# Patient Record
Sex: FEMALE | Race: WHITE | Employment: FULL TIME | URBAN - METROPOLITAN AREA
[De-identification: names, ages, dates, MRNs, and addresses within clinical notes are randomized per-mention and may not be internally consistent; named-entity substitution may affect disease eponyms.]

---

## 2018-03-26 ENCOUNTER — HOSPITAL ENCOUNTER (OUTPATIENT)
Dept: PULMONOLOGY | Age: 68
Discharge: HOME | End: 2018-03-26
Attending: INTERNAL MEDICINE
Payer: COMMERCIAL

## 2018-03-26 DIAGNOSIS — R06.09 DYSPNEA ON EXERTION: ICD-10-CM

## 2018-03-26 PROCEDURE — 94375 RESPIRATORY FLOW VOLUME LOOP: CPT

## 2018-03-26 PROCEDURE — 94726 PLETHYSMOGRAPHY LUNG VOLUMES: CPT

## 2018-03-26 PROCEDURE — 94729 DIFFUSING CAPACITY: CPT

## 2018-04-09 NOTE — PROCEDURES
[facility]  PULMONARY FUNCTION    Una Castro  MR#: [id number]  : 1950  ACCOUNT #: [de-identified]   DATE OF SERVICE: 2018    REASON FOR TEST:  Dyspnea with exertion. Spirometry and lung volumes were performed and they revealed  1. No airflow obstruction. 2.  No restrictive lung disease. 3.  Mild reduction in DLCO. 4.  Normal flow volume loop.       Magy Rivera MD       ERG / GN  D: 2018 08:50     T: 2018 09:25  JOB #: [id number]  CC: Dinorah Gerard MD

## 2021-09-13 ENCOUNTER — TRANSCRIBE ORDER (OUTPATIENT)
Dept: SCHEDULING | Age: 71
End: 2021-09-13

## 2021-09-13 DIAGNOSIS — S43.005A SHOULDER DISLOCATION, LEFT, INITIAL ENCOUNTER: Primary | ICD-10-CM

## 2021-09-13 DIAGNOSIS — M75.102 ROTATOR CUFF TEAR, LEFT: ICD-10-CM

## 2021-09-16 ENCOUNTER — HOSPITAL ENCOUNTER (OUTPATIENT)
Dept: CT IMAGING | Age: 71
Discharge: HOME | End: 2021-09-16
Attending: ORTHOPAEDIC SURGERY
Payer: COMMERCIAL

## 2021-09-16 ENCOUNTER — HOSPITAL ENCOUNTER (OUTPATIENT)
Dept: GENERAL RADIOLOGY | Age: 71
Discharge: HOME | End: 2021-09-16
Attending: ORTHOPAEDIC SURGERY
Payer: COMMERCIAL

## 2021-09-16 DIAGNOSIS — S43.005A SHOULDER DISLOCATION, LEFT, INITIAL ENCOUNTER: ICD-10-CM

## 2021-09-16 DIAGNOSIS — M75.102 ROTATOR CUFF TEAR, LEFT: ICD-10-CM

## 2021-09-16 PROCEDURE — 77030014113 XR ARTHRO SHOULDER LT

## 2021-09-16 PROCEDURE — 74011000636 HC RX REV CODE- 636: Performed by: RADIOLOGY

## 2021-09-16 PROCEDURE — 73200 CT UPPER EXTREMITY W/O DYE: CPT

## 2021-09-16 RX ADMIN — IOHEXOL 12 ML: 180 INJECTION INTRAVENOUS at 11:43

## 2021-10-20 VITALS — WEIGHT: 170 LBS | BODY MASS INDEX: 33.38 KG/M2 | HEIGHT: 60 IN

## 2021-10-20 PROBLEM — M75.102 TEAR OF LEFT ROTATOR CUFF: Status: ACTIVE | Noted: 2021-10-20

## 2021-10-20 PROBLEM — S43.005A DISLOCATION OF LEFT SHOULDER JOINT: Status: ACTIVE | Noted: 2021-10-20

## 2021-10-20 RX ORDER — OXYBUTYNIN CHLORIDE 5 MG/1
5 TABLET ORAL 3 TIMES DAILY
COMMUNITY
End: 2021-11-10

## 2021-10-20 RX ORDER — LOSARTAN POTASSIUM 100 MG/1
100 TABLET ORAL DAILY
COMMUNITY

## 2021-11-10 ENCOUNTER — OFFICE VISIT (OUTPATIENT)
Dept: ORTHOPEDIC SURGERY | Age: 71
End: 2021-11-10
Payer: COMMERCIAL

## 2021-11-10 VITALS — HEIGHT: 60 IN | WEIGHT: 170 LBS | BODY MASS INDEX: 33.38 KG/M2

## 2021-11-10 DIAGNOSIS — M70.62 TROCHANTERIC BURSITIS OF BOTH HIPS: ICD-10-CM

## 2021-11-10 DIAGNOSIS — M22.42 CHONDROMALACIA OF LEFT PATELLA: ICD-10-CM

## 2021-11-10 DIAGNOSIS — M22.41 CHONDROMALACIA OF RIGHT PATELLA: ICD-10-CM

## 2021-11-10 DIAGNOSIS — Z98.890 STATUS POST ARTHROSCOPY OF SHOULDER: Primary | ICD-10-CM

## 2021-11-10 DIAGNOSIS — M70.61 TROCHANTERIC BURSITIS OF BOTH HIPS: ICD-10-CM

## 2021-11-10 PROCEDURE — 99024 POSTOP FOLLOW-UP VISIT: CPT | Performed by: ORTHOPAEDIC SURGERY

## 2021-11-10 RX ORDER — GUAIFENESIN 100 MG/5ML
81 LIQUID (ML) ORAL DAILY
COMMUNITY

## 2021-11-10 RX ORDER — OXYBUTYNIN CHLORIDE 10 MG/1
TABLET, EXTENDED RELEASE ORAL
COMMUNITY
Start: 2021-11-04

## 2021-11-10 RX ORDER — AMOXICILLIN 500 MG/1
CAPSULE ORAL
COMMUNITY
Start: 2021-08-19

## 2021-11-10 RX ORDER — DICLOFENAC SODIUM 75 MG/1
75 TABLET, DELAYED RELEASE ORAL 2 TIMES DAILY
Qty: 60 TABLET | Refills: 3 | Status: CANCELLED | OUTPATIENT
Start: 2021-11-10

## 2021-11-10 RX ORDER — DICLOFENAC SODIUM 10 MG/G
GEL TOPICAL
COMMUNITY

## 2021-11-10 RX ORDER — OMEPRAZOLE 40 MG/1
CAPSULE, DELAYED RELEASE ORAL
COMMUNITY
Start: 2021-09-08

## 2021-11-10 NOTE — PROGRESS NOTES
Antoni Yanez (: 1950) is a 73 y.o. female, established patient, here for evaluation of the following chief complaint(s):  Shoulder Pain and Post OP Follow Up       ASSESSMENT/PLAN:  Below is the assessment and plan developed based on review of pertinent history, physical exam, labs, studies, and medications. We will continue physical therapy regimen to work on passive and active range of motion of the shoulder. We will also start therapy for bilateral hip trochanteric bursitis and bilateral knee chondromalacia patella symptoms. I will see her back in 6 weeks to assess her progress. 1. Status post arthroscopy of shoulder  2. Chondromalacia of left patella  3. Chondromalacia of right patella  4. Trochanteric bursitis of both hips      Return in about 6 weeks (around 2021). SUBJECTIVE/OBJECTIVE:  Antoni Yanez (: 1950) is a 73 y.o. female. Status post left shoulder arthroscopy with small rotator cuff repair and acromioplasty. This is her second postoperative appointment. Overall doing well with appropriate pain. She also notes bilateral hip lateral sided pain and bilateral anterior knee pain. Symptoms ongoing since her fall. Allergies   Allergen Reactions    Morphine Other (comments)     Cold sweats & nightmares       Current Outpatient Medications   Medication Sig    aspirin (Timur Chewable Aspirin) 81 mg chewable tablet Take 81 mg by mouth daily.  calcium-vits J0-I-Y4-minerals 166.75 mg- 166.75 unit cap calcium    TURMERIC PO turmeric    amoxicillin (AMOXIL) 500 mg capsule     diclofenac (VOLTAREN) 1 % gel diclofenac 1 % topical gel   APPLY 2 GRAM TO THE AFFECTED AREA(S) BY TOPICAL ROUTE 4 TIMES PER DAY    omeprazole (PRILOSEC) 40 mg capsule     oxybutynin chloride XL (DITROPAN XL) 10 mg CR tablet     losartan (COZAAR) 100 mg tablet Take 100 mg by mouth daily.  cholecalciferol (VITAMIN D3) 400 unit tab tablet Take 1,000 Units by mouth daily.     montelukast (SINGULAIR) 10 mg tablet TAKE 1 TABLET BY MOUTH EVERY DAY    atorvastatin (LIPITOR) 40 mg tablet Take 1 Tab by mouth daily.  albuterol (PROAIR HFA) 90 mcg/Actuation inhaler TAKE 2 PUFFS BY INHALATION EVERY FOUR (4) HOURS AS NEEDED FOR WHEEZING.  aspirin delayed-release (ASPIR-81) 81 mg tablet Take  by mouth daily.  GLUC BALDERRAMA 2KCL/MSM/CSA/ROSA/ (GLUCOSAMIN-CHOND-MSM-ROSA-115HC PO) Take  by mouth.  MULTI-VITAMIN PO Take  by mouth. No current facility-administered medications for this visit. Social History     Socioeconomic History    Marital status:      Spouse name: Not on file    Number of children: Not on file    Years of education: Not on file    Highest education level: Not on file   Occupational History    Not on file   Tobacco Use    Smoking status: Former Smoker     Packs/day: 0.25     Years: 30.00     Pack years: 7.50     Quit date: 10/1/2002     Years since quittin.1    Smokeless tobacco: Never Used    Tobacco comment: on and off for 30 years   Substance and Sexual Activity    Alcohol use: Yes     Alcohol/week: 0.8 standard drinks     Types: 1 Glasses of wine per week     Comment: maybe 1 glass/week    Drug use: No    Sexual activity: Not Currently   Other Topics Concern    Not on file   Social History Narrative    Not on file     Social Determinants of Health     Financial Resource Strain:     Difficulty of Paying Living Expenses: Not on file   Food Insecurity:     Worried About Running Out of Food in the Last Year: Not on file    Jyothi of Food in the Last Year: Not on file   Transportation Needs:     Lack of Transportation (Medical): Not on file    Lack of Transportation (Non-Medical):  Not on file   Physical Activity:     Days of Exercise per Week: Not on file    Minutes of Exercise per Session: Not on file   Stress:     Feeling of Stress : Not on file   Social Connections:     Frequency of Communication with Friends and Family: Not on file    Frequency of Social Gatherings with Friends and Family: Not on file    Attends Yazidism Services: Not on file    Active Member of Clubs or Organizations: Not on file    Attends Club or Organization Meetings: Not on file    Marital Status: Not on file   Intimate Partner Violence:     Fear of Current or Ex-Partner: Not on file    Emotionally Abused: Not on file    Physically Abused: Not on file    Sexually Abused: Not on file   Housing Stability:     Unable to Pay for Housing in the Last Year: Not on file    Number of [place] in the Last Year: Not on file    Unstable Housing in the Last Year: Not on file       Past Surgical History:   Procedure Laterality Date    ENDOSCOPY, COLON, DIAGNOSTIC  6/04    due 6/14    HX BREAST BIOPSY  6/06    BENIGN    HX GI      COLONOSCOPY    HX GYN  10/01    thermal ablation   [address]    clogged milk duct    [place] CARDIAC SURG PROCEDURE UNLIST  2014    CARDIAC CATH       Family History   Problem Relation Age of Onset    Alcohol abuse Mother     Heart Disease Mother     Hypertension Mother    Barraza Elevated Lipids Mother     Alcohol abuse Father     Heart Disease Father     Diabetes Father     Elevated Lipids Father     Hypertension Father     Cancer Paternal Uncle         lung    Heart Disease Brother 46        MI, PTCA/stent    Alcohol abuse Brother     Cancer Daughter         skin    Alcohol abuse Son     Heart Disease Maternal Aunt     Heart Disease Maternal Uncle     Cancer Paternal Aunt         uterine    Heart Disease Maternal Aunt     Heart Disease Maternal Aunt     Heart Disease Maternal Aunt     Heart Disease Paternal Aunt         OB History    No obstetric history on file.             REVIEW OF SYSTEMS:  ROS     Positive for: Musculoskeletal    Last edited by Ben Grimm on 11/10/2021  9:54 AM. (History)        Patient denies any recent fever, chills, nausea, vomiting, chest pain, or shortness of breath. Vitals:  Ht 5' (1.524 m)   Wt 170 lb (77.1 kg)   BMI 33.20 kg/m²    Body mass index is 33.2 kg/m². PHYSICAL EXAM:  General exam: Patient is awake, alert, and oriented x3. Well-appearing. No acute distress. Ambulates with a normal gait. Left shoulder: Neurovascular and sensory intact. Mild swelling and ecchymosis. Incision is well-healed with no erythema or drainage. Bilateral hips: Neurovascular and sensory intact. There is point tenderness palpation at the lateral hip in the area of the trochanteric bursa. Mild swelling. No erythema or ecchymosis. There is normal range of motion of the hip with some lateral sided pain with full flexion and internal rotation. Normal stability. No crepitus. Bilateral knees: Neurovascular and sensory intact. There is tenderness to palpation in the parapatellar region. There is crepitus with range of motion. No significant effusion noted. There is no joint line tenderness to palpation. Brooks's maneuver is nonpainful. Normal stability on Lachman's exam and anterior/posterior drawer testing. No erythema or ecchymosis. IMAGING:    XR Results (most recent):  Results from Hospital Encounter encounter on 09/16/21    XR ARTHRO SHOULDER LT    Narrative  Clinical indication: Shoulder dislocation left. With fluoroscopy assistance, after adequate skin preparation local anesthesia,  22-gauge needle was placed into the left shoulder joint, 15 cc of Isovue 180  were injected. After mild exercise shoulder arthrogram show normal joint  capacity mild abnormality of the synovial lining but no extravasation to suggest  a tear. CT is planned for further evaluation. Impression  No evidence for a rotator cuff tear. CT to follow. FLUOROSCOPY DOSE (air kerma): 40.25 mGy      Results from Hospital Encounter encounter on 02/29/16    XR CHEST PORT    Narrative  **Final Report**      ICD Codes / Adm. Diagnosis: 501386  [id number] / Abdominal Pain Syncope  Examination:  CR CHEST PORT  - [id number] - Feb 29 2016  1:50AM  Accession No:  [id number]  Reason:  pain/syncope      REPORT:  INDICATION:  pain/syncope    EXAM: Single portable view of chest 0145 hours dated 2/29/2016. Comparison  August 20, 2014. Findings: Cardiac silhouette is enlarged. Pulmonary vasculature is not  engorged. There are no focal parenchymal opacities, effusions, or  pneumothorax. Left chest pacer unchanged    Impression  :  1. Enlarged cardiac silhouette, otherwise no acute disease          Signing/Reading Doctor: Umang Franco ([id number])  Approved: Umang Franco ([id number])  Feb 29 2016  1:53AM      Results from Hospital Encounter encounter on 08/20/14    XR CHEST PORT    Narrative  **Final Report**      ICD Codes / Adm. Diagnosis: V43.3  V72.82 / revision  Examination:  CR CHEST PORT  - [id number] - Aug 20 2014 10:47AM  Accession No:  [id number]  Reason:  Evaluate for pneumothorax      REPORT:  EXAM:  CR CHEST PORT    INDICATION:  Evaluate for pneumothorax    COMPARISON:  7/19/2014    FINDINGS:    A portable AP radiograph of the chest was obtained at 10:45 hours. There is  no evidence of pneumothorax. The lungs are clear. There is unchanged  enlargement of the cardiac silhouette. A heart valve prosthesis which  appears to be the aortic valve is noted. There is a dual-chamber pacemaker  which appears intact. No vascular congestion or pleural fluid are  demonstrated. The previous left basilar density has resolved. Impression  :    No evidence of pneumothorax. Cardiomegaly without acute cardiopulmonary  disease. Signing/Reading Doctor: Homa Benz ([id number])  Approved: Homa Benz ([id number])  Aug 20 2014 11:47AM         No orders of the defined types were placed in this encounter. An electronic signature was used to authenticate this note.   -- Deonna Eason DO

## 2021-11-10 NOTE — LETTER
11/10/2021    Patient: Jessie Palomino   YOB: 1950   Date of Visit: 11/10/2021     Ethan Chung MD  [address]  [address] [address]  Via Fax: [phone]    Dear Ethan Chung MD,      Thank you for referring Ms. Sushant Jackson to [facility] for evaluation. My notes for this consultation are attached. If you have questions, please do not hesitate to call me. I look forward to following your patient along with you.       Sincerely,    Rahat Murguia, DO

## 2021-11-11 ENCOUNTER — OFFICE VISIT (OUTPATIENT)
Dept: ORTHOPEDIC SURGERY | Age: 71
End: 2021-11-11
Payer: COMMERCIAL

## 2021-11-11 DIAGNOSIS — M25.512 LEFT SHOULDER PAIN, UNSPECIFIED CHRONICITY: ICD-10-CM

## 2021-11-11 DIAGNOSIS — Z98.890 STATUS POST ARTHROSCOPY OF SHOULDER: Primary | ICD-10-CM

## 2021-11-11 PROCEDURE — 97110 THERAPEUTIC EXERCISES: CPT | Performed by: PHYSICAL THERAPIST

## 2021-11-11 NOTE — PROGRESS NOTES
I have reviewed the notes, assessments, and/or procedures performed by Angelica Fuss PTA, I concur with her/his documentation of Dio Yoo.

## 2021-11-11 NOTE — PROGRESS NOTES
PT DAILY TREATMENT NOTE    Patient Name: Matt Vallejo  Date:2021  : 1950  [x]  Patient  Verified  Payor: Jose Theodore / Plan: Shanelle Tia / Product Type: HMO /    Total Treatment Time (min): 55  Total Timed Codes (min): 55  Visit #:   1. Status post arthroscopy of shoulder    2. Left shoulder pain, unspecified chronicity       SUBJECTIVE  Subjective functional status/changes:   [] No changes reported    Patient reports she is a little more sore today than usual since receiving her covid-19 booster shot yesterday. OBJECTIVE    Manual Therapy:     Manual posterior and inferior glenohumeral mobilizations and PROM for glenohumeral mobility and flexibility all planes to decrease joint restrictions and increase range of motion. LLPS for internal/external rotation with humeral head stabilization. STM to RC musculature. Therapeutic Exercise:   Strengthening/Endurance/ADL function/Neuromuscular reeducation activities/exercises supervised and completed per flow sheet. ROM      STRENGTHENING   NMR/PROPRIO   [x] pulleys sit/stand    [x] scapular rows: grn   [x] DAP eccentric scaption: 5 #  [x] AAROM flexion with stick: 0 #  [x] iso.  IR/ER: pch   [] bodyblade  [x] AAROM elevation TG ball roll  [x] iso. ant/post deltoid: pch  [] BOW push-ups  []  1/2 foam roll walk    [] UBE:   man. @ lv 1.0  [] ABC's with BOW        [] DAP bicep curls:    #  [] DAP tricep press:   #  [] DAP FWD press:   #    Added/Changed Exercises:  []  Advanced as indicated in bold to address: [] functional strength/ROM deficits [] balance/proprioceptive tasks  []  Modified: [] per subjective reports [] for patient time constraints [] for clinic time constraints      Modality:  []  E-Stim: type _ x _ min     []att   []unatt   []w/ice   []w/heat  []  Ultrasound: []cont   []pulse    _ W/cm2 x _  min   []1MHz   []3MHz  [x]  Ice pack: post      [x]  Hot pack: pre    []  Other:     Neuromuscular Re-education:  [] Kinesiotaping for   []  Neuromuscular reeducation to the VMO with use of [place] electrical stimulation in conjunction with active contraction and exercises. Patient Education: [] Review HEP    [] Progressed/Changed HEP based on:  [] positioning   [] body mechanics   [] transfers   [] heat/ice application      ASSESSMENT  []  See progress note/recertification    She has very good ROM at this point in post-op timeline with expected end range capsular tightness. Continued to advance strengthening next visit.      PLAN  [x]  Upgrade activities as tolerated      [x]  Continue plan of care  []  Discharge due to:_  [] Other:_      Co-treatment by Art Heal, PTA 11/11/2021  4:55 PM

## 2021-11-15 ENCOUNTER — OFFICE VISIT (OUTPATIENT)
Dept: ORTHOPEDIC SURGERY | Age: 71
End: 2021-11-15
Payer: COMMERCIAL

## 2021-11-15 DIAGNOSIS — G89.18 ACUTE POSTOPERATIVE PAIN OF LEFT SHOULDER: Primary | ICD-10-CM

## 2021-11-15 DIAGNOSIS — M67.912 ROTATOR CUFF DYSFUNCTION, LEFT: ICD-10-CM

## 2021-11-15 DIAGNOSIS — M25.512 ACUTE POSTOPERATIVE PAIN OF LEFT SHOULDER: Primary | ICD-10-CM

## 2021-11-15 PROCEDURE — 97110 THERAPEUTIC EXERCISES: CPT | Performed by: PHYSICAL THERAPIST

## 2021-11-15 NOTE — PROGRESS NOTES
PT DAILY TREATMENT NOTE    Patient Name: Rolo Barajas  Date:11/15/2021  : 1950  [x]  Patient  Verified  Payor: [employer] / Plan: Ever Gallego / Product Type: HMO /    Total Treatment Time (min): 60  Total Timed Codes (min): 60    1. Status post arthroscopy of shoulder    2. Left shoulder pain, unspecified chronicity       SUBJECTIVE  Subjective functional status/changes:   [] No changes reported      Patient states that shoulder is sore. Believes it may be related to increased activity cleaning around her home and returning to work. Locates soreness around the scapula, upper trapezius, and proximal humerus. OBJECTIVE    Manual Therapy:     Manual posterior and inferior glenohumeral mobilizations and PROM for glenohumeral mobility and flexibility all planes to decrease joint restrictions and increase range of motion. LLPS for internal/external rotation with humeral head stabilization. STM to RC musculature. Therapeutic Exercise:   Strengthening/Endurance/ADL function/Neuromuscular reeducation activities/exercises supervised and completed per flow sheet. ROM      STRENGTHENING   NMR/PROPRIO   [x] pulleys sit/stand    [x] scapular rows: grn   [x] DAP eccentric scaption: 5 #  [x] AAROM flexion with stick: 0 #  [x] iso.  IR/ER: pch   [] bodyblade  [x] AAROM elevation TG ball roll  [x] ant/post deltoid: pch  [] BOW push-ups  []  1/2 foam roll walk    [] UBE:   man. @ lv 1.0  [] ABC's with BOW        [] DAP bicep curls:    #  [] DAP tricep press:   #  [] DAP FWD press:   #    Added/Changed Exercises:  []  Advanced as indicated in bold to address: [] functional strength/ROM deficits [] balance/proprioceptive tasks  []  Modified: [] per subjective reports [] for patient time constraints [] for clinic time constraints       Modality:  []  E-Stim: type _ x _ min     []att   []unatt   []w/ice   []w/heat  []  Ultrasound: []cont   []pulse    _ W/cm2 x _  min   []1MHz   []3MHz  [x]  Ice pack: post [x]  Hot pack: pre    []  Other:     Neuromuscular Re-education:  []  Kinesiotaping for   []  Neuromuscular reeducation to the VMO with use of [place] electrical stimulation in conjunction with active contraction and exercises. Manual assisted neuromuscular down regulation techniques to the: upper trapezius,supraspinatus, infraspinatus    Patient Education: [] Review HEP    [] Progressed/Changed HEP based on:  [] positioning   [x] body mechanics   [] transfers   [] heat/ice application      ASSESSMENT  [x]  See progress note/recertification  Good PROM for this point in rehab timeline. Appropriate soreness for recent increase in activity.       PLAN  [x]  Upgrade activities as tolerated      [x]  Continue plan of care  []  Discharge due to:_  [] Other:_      Co-treatment by Olive Jolly PT, DPT 11/15/2021  4:55 PM

## 2021-11-17 ENCOUNTER — OFFICE VISIT (OUTPATIENT)
Dept: ORTHOPEDIC SURGERY | Age: 71
End: 2021-11-17
Payer: COMMERCIAL

## 2021-11-17 DIAGNOSIS — M25.512 ACUTE POSTOPERATIVE PAIN OF LEFT SHOULDER: ICD-10-CM

## 2021-11-17 DIAGNOSIS — G89.18 ACUTE POSTOPERATIVE PAIN OF LEFT SHOULDER: ICD-10-CM

## 2021-11-17 DIAGNOSIS — M67.912 ROTATOR CUFF DYSFUNCTION, LEFT: Primary | ICD-10-CM

## 2021-11-17 PROCEDURE — 97110 THERAPEUTIC EXERCISES: CPT | Performed by: PHYSICAL THERAPIST

## 2021-11-17 NOTE — PROGRESS NOTES
PT DAILY TREATMENT NOTE    Patient Name: Ravindra Kelsey  Date:2021  : 1950  [x]  Patient  Verified  Payor: Michael De Paz / Plan: Vanessa Corbin / Product Type: HMO /    Total Treatment Time (min): 60  Total Timed Codes (min): 60    1. Status post arthroscopy of shoulder    2. Left shoulder pain, unspecified chronicity       SUBJECTIVE  Subjective functional status/changes:   [] No changes reported    Still with superior shoulder soreness. OBJECTIVE    Manual Therapy:     Manual posterior and inferior glenohumeral mobilizations and PROM for glenohumeral mobility and flexibility all planes to decrease joint restrictions and increase range of motion while patient supine/sidelying. LLPS for internal/external rotation with humeral head stabilization. Therapeutic Exercise:   Strengthening/Endurance/ADL function/Neuromuscular reeducation activities/exercises supervised and completed per flow sheet. ROM      STRENGTHENING   NMR/PROPRIO   [x] pulleys sit/stand    [x] scapular rows: grn   [x] DAP eccentric scaption: 5 #  [x] AAROM flexion with stick: 0 #  [x] iso.  IR/ER: pch   [] bodyblade  [x] AAROM elevation TG ball roll  [x] ant/post deltoid: pch  [] BOW push-ups  []  1/2 foam roll walk    [] UBE:   man. @ lv 1.0  [] ABC's with BOW        [] DAP bicep curls:    #  [] DAP tricep press:   #  [] DAP FWD press:   #    Added/Changed Exercises:  []  Advanced as indicated in bold to address: [] functional strength/ROM deficits [] balance/proprioceptive tasks  []  Modified: [] per subjective reports [] for patient time constraints [] for clinic time constraints       Modality:  []  E-Stim: type _ x _ min     []att   []unatt   []w/ice   []w/heat  []  Ultrasound: []cont   []pulse    _ W/cm2 x _  min   []1MHz   []3MHz  [x]  Ice pack: post      [x]  Hot pack: pre    []  Other:     Neuromuscular Re-education:  []  Kinesiotaping for   []  Neuromuscular reeducation to the VMO with use of [place] electrical stimulation in conjunction with active contraction and exercises. Manual assisted neuromuscular down regulation techniques to the: upper trapezius,supraspinatus, infraspinatus    Patient Education: [] Review HEP    [] Progressed/Changed HEP based on:  [] positioning   [x] body mechanics   [] transfers   [] heat/ice application      ASSESSMENT  [x]  See progress note/recertification    Trigger point type restrictions noted to the supraspinatus. Passive range of motion is improving but still with subjective pain and soreness.         PLAN  [x]  Upgrade activities as tolerated      [x]  Continue plan of care  []  Discharge due to:_  [] Other:_      Co-treatment by Sabi Scott PT, DPT 11/16/2021  4:55 PM

## 2021-11-23 ENCOUNTER — OFFICE VISIT (OUTPATIENT)
Dept: ORTHOPEDIC SURGERY | Age: 71
End: 2021-11-23
Payer: COMMERCIAL

## 2021-11-23 DIAGNOSIS — Z98.890 STATUS POST ARTHROSCOPY OF SHOULDER: Primary | ICD-10-CM

## 2021-11-23 DIAGNOSIS — M25.512 LEFT SHOULDER PAIN, UNSPECIFIED CHRONICITY: ICD-10-CM

## 2021-11-23 PROCEDURE — 97110 THERAPEUTIC EXERCISES: CPT | Performed by: PHYSICAL THERAPIST

## 2021-11-23 NOTE — PROGRESS NOTES
PT DAILY TREATMENT NOTE    Patient Name: Dio Yoo  Date:2021  : 1950  [x]  Patient  Verified  Payor: Damián Paez / Plan: Jamari Godfrey / Product Type: HMO /    Total Treatment Time (min): 60  Total Timed Codes (min): 60  Visit #:  30    1. Status post arthroscopy of shoulder  2. Left shoulder pain, unspecified chronicity    SUBJECTIVE  Subjective functional status/changes:   [] No changes reported    Patient reports her shoulder has been feeling pretty good with basic household ADLs, but she is still being careful to not lift anything too heavy. OBJECTIVE    Therapeutic Exercise x 60 mins:   Osteokinematic, arthrokinematic, and capsule mobilizatin and passive range of motion techniques were applied to the involved upper extremity. neuromuscular down regulation to the myofascial soft tissue structures of the Glenohumeral and Scapulothoracic region. Strengthening/Endurance/ADL function/Neuromuscular reeducation activities/exercises supervised and completed per flow sheet. ROM      STRENGTHENING   NMR/PROPRIO   [x] pulleys sit/stand    [x] scapular rows: blue  [x] DAP eccentric scaption: 5#  [x] AAROM flexion with stick:1 #  [x] iso. IR/ER: org    [] bodyblade  [x] AAROM elevation TG ball roll  [x] AROM ant/post deltoid: pch [] BOW push-ups  []  1/2 foam roll walk    [x] UBE:  Man. 3'/3' @ lv 1.0  [] ABC's with BOW  []  IR with belt     [x] DAP bicep curls:5 #  [] DAP tricep press:  #  [] DAP FWD press:  #  Added/Changed Exercises:  [x]  Advanced as indicated in bold to address: [x] functional strength/ROM deficits [] balance/proprioceptive tasks  []  Modified: [] per subjective reports [] for patient time constraints [] for clinic time constraints      Modality:  []  E-Stim: type _ x _ min     []att   []unatt   []w/ice   []w/heat  []  Ultrasound: []cont   []pulse    _ W/cm2 x _  min   []1MHz   []3MHz  []  Ice pack: post      []  Hot pack: pre    []  Other:     Neuromuscular Re-education minutes:  []  Kinesiotaping for   []  Neuromuscular reeducation to the VMO with use of [place] electrical stimulation in conjunction with active contraction and exercises. []  balance/proprioceptive exercises and activities in clinic as listed on flow sheet above. Patient Education: [] Review HEP    [] Progressed/Changed HEP based on:  [] positioning   [] body mechanics   [] transfers   [] heat/ice application      ASSESSMENT  []  See progress note/recertification    She has nearly full ROM all directions with some end range discomfort. She is challenged by exercise progression to address strength deficits for functional lift/carry tasks.     Progress towards goals / Updated goals:    PLAN  [x]  Upgrade activities as tolerated      [x]  Continue plan of care  []  Discharge due to:_  [] Other:_      Co-treatment by Mai Councilman, PTA 11/23/2021

## 2021-11-23 NOTE — PROGRESS NOTES
I have reviewed the notes, assessments, and/or procedures performed by Author Cris PTA, I concur with her/his documentation of Rubio Rooney.

## 2021-11-23 NOTE — PROGRESS NOTES
PT DAILY TREATMENT NOTE    Patient Name: David Silva  Date:2021  : 1950  [x]  Patient  Verified  Payor: [employer]ees / Plan: Rishabh Vela / Product Type: HMO /    Total Treatment Time (min): 60  Total Timed Codes (min): 60    1. Status post arthroscopy of shoulder    2. Left shoulder pain, unspecified chronicity       SUBJECTIVE  Subjective functional status/changes:   [] No changes reported           OBJECTIVE    Manual Therapy:     Manual posterior and inferior glenohumeral mobilizations and PROM for glenohumeral mobility and flexibility all planes to decrease joint restrictions and increase range of motion while patient supine/sidelying. LLPS for internal/external rotation with humeral head stabilization. Therapeutic Exercise:   Strengthening/Endurance/ADL function/Neuromuscular reeducation activities/exercises supervised and completed per flow sheet. ROM      STRENGTHENING   NMR/PROPRIO   [x] pulleys sit/stand    [x] scapular rows: grn   [x] DAP eccentric scaption: 5 #  [x] AAROM flexion with stick: 0 #  [x] iso.  IR/ER: pch   [] bodyblade  [x] AAROM elevation TG ball roll  [x] ant/post deltoid: pch  [] BOW push-ups  []  1/2 foam roll walk    [] UBE:   man. @ lv 1.0  [] ABC's with BOW        [] DAP bicep curls:    #  [] DAP tricep press:   #  [] DAP FWD press:   #    Added/Changed Exercises:  []  Advanced as indicated in bold to address: [] functional strength/ROM deficits [] balance/proprioceptive tasks  []  Modified: [] per subjective reports [] for patient time constraints [] for clinic time constraints       Modality:  []  E-Stim: type _ x _ min     []att   []unatt   []w/ice   []w/heat  []  Ultrasound: []cont   []pulse    _ W/cm2 x _  min   []1MHz   []3MHz  [x]  Ice pack: post      [x]  Hot pack: pre    []  Other:     Neuromuscular Re-education:  []  Kinesiotaping for   []  Neuromuscular reeducation to the VMO with use of [place] electrical stimulation in conjunction with active contraction and exercises.    Manual assisted neuromuscular down regulation techniques to the: upper trapezius,supraspinatus, infraspinatus    Patient Education: [] Review HEP    [] Progressed/Changed HEP based on:  [] positioning   [x] body mechanics   [] transfers   [] heat/ice application      ASSESSMENT              PLAN  [x]  Upgrade activities as tolerated      [x]  Continue plan of care  []  Discharge due to:_  [] Other:_      Co-treatment by Clarence Means PT, DPT 11/23/2021  4:55 PM

## 2021-11-30 ENCOUNTER — OFFICE VISIT (OUTPATIENT)
Dept: ORTHOPEDIC SURGERY | Age: 71
End: 2021-11-30
Payer: COMMERCIAL

## 2021-11-30 DIAGNOSIS — Z98.890 STATUS POST ARTHROSCOPY OF SHOULDER: ICD-10-CM

## 2021-11-30 DIAGNOSIS — M67.912 ROTATOR CUFF DYSFUNCTION, LEFT: Primary | ICD-10-CM

## 2021-11-30 PROCEDURE — 97110 THERAPEUTIC EXERCISES: CPT | Performed by: PHYSICAL THERAPIST

## 2021-11-30 NOTE — PROGRESS NOTES
PT DAILY TREATMENT NOTE    Patient Name: Rolo Barajas  Date:2021  : 1950  [x]  Patient  Verified  Payor: [employer] / Plan: Ever Gallego / Product Type: HMO /    Total Treatment Time (min): 60  Total Timed Codes (min): 60  Visit #:  30    1. Status post arthroscopy of shoulder  2. Left shoulder pain, unspecified chronicity    SUBJECTIVE  Subjective functional status/changes:   [] No changes reported    Patient reports she has been using her shoulder more around the house lately and is a little sore today. OBJECTIVE    Therapeutic Exercise x 60 mins:   Osteokinematic, arthrokinematic, and capsule mobilizatin and passive range of motion techniques were applied to the involved upper extremity. neuromuscular down regulation to the myofascial soft tissue structures of the Glenohumeral and Scapulothoracic region. Strengthening/Endurance/ADL function/Neuromuscular reeducation activities/exercises supervised and completed per flow sheet. ROM      STRENGTHENING   NMR/PROPRIO   [x] pulleys sit/stand    [x] scapular rows: blue  [x] DAP eccentric scaption: 5#  [x] AAROM flexion with stick:1 #  [x] iso. IR/ER: org    [] bodyblade  [x] AAROM elevation TG ball roll  [x] AROM ant/post deltoid: pch [] BOW push-ups  []  1/2 foam roll walk    [x] UBE:  Man. 3'/3' @ lv 1.0  [] ABC's with BOW  []  IR with belt     [x] DAP bicep curls:5 #   [] DAP tricep press:  #  [x] DAP FWD press: 2.5#  Added/Changed Exercises:  [x]  Advanced as indicated in bold to address: [x] functional strength/ROM deficits [] balance/proprioceptive tasks  []  Modified: [] per subjective reports [] for patient time constraints [] for clinic time constraints      Modality:  []  E-Stim: type _ x _ min     []att   []unatt   []w/ice   []w/heat  []  Ultrasound: []cont   []pulse    _ W/cm2 x _  min   []1MHz   []3MHz  []  Ice pack: post      []  Hot pack: pre    []  Other:     Neuromuscular Re-education minutes:  []  Kinesiotaping for   []  Neuromuscular reeducation to the VMO with use of [place] electrical stimulation in conjunction with active contraction and exercises. []  balance/proprioceptive exercises and activities in clinic as listed on flow sheet above. Patient Education: [] Review HEP    [] Progressed/Changed HEP based on:  [] positioning   [] body mechanics   [] transfers   [] heat/ice application      ASSESSMENT  []  See progress note/recertification    Still with clear functional weakness for lift/push/carry tasks and challenged by exercise progression to address these deficits. End range capsular restrictions but overall doing well at this point in post-op timeline.     Progress towards goals / Updated goals:    PLAN  [x]  Upgrade activities as tolerated      [x]  Continue plan of care  []  Discharge due to:_  [] Other:_      Co-treatment by Bhargavi Barfield, PTA 11/30/2021

## 2021-12-01 NOTE — PROGRESS NOTES
I have reviewed the notes, assessments, and/or procedures performed by Marcos Pan PTA, I concur with her/his documentation of Mattson Knife.

## 2021-12-02 ENCOUNTER — OFFICE VISIT (OUTPATIENT)
Dept: ORTHOPEDIC SURGERY | Age: 71
End: 2021-12-02
Payer: COMMERCIAL

## 2021-12-02 DIAGNOSIS — G89.18 ACUTE POSTOPERATIVE PAIN OF LEFT SHOULDER: ICD-10-CM

## 2021-12-02 DIAGNOSIS — Z98.890 STATUS POST ARTHROSCOPY OF SHOULDER: ICD-10-CM

## 2021-12-02 DIAGNOSIS — M25.512 ACUTE POSTOPERATIVE PAIN OF LEFT SHOULDER: ICD-10-CM

## 2021-12-02 DIAGNOSIS — M67.912 ROTATOR CUFF DYSFUNCTION, LEFT: Primary | ICD-10-CM

## 2021-12-02 PROCEDURE — 97110 THERAPEUTIC EXERCISES: CPT | Performed by: PHYSICAL THERAPIST

## 2021-12-02 NOTE — PROGRESS NOTES
PT DAILY TREATMENT NOTE    Patient Name: Pricila Matthew  Date:2021  : 1950  [x]  Patient  Verified  Payor: Harriet Aguero / Plan: Felicia Hoff / Product Type: HMO /    Total Treatment Time (min): 60  Total Timed Codes (min): 60  Visit #:  30    1. Status post arthroscopy of shoulder  2. Left shoulder pain, unspecified chronicity    SUBJECTIVE  Subjective functional status/changes:   [] No changes reported    Patient reports she has been more sore than usual since exercise progression last visit. OBJECTIVE    Therapeutic Exercise x 60 mins:   Osteokinematic, arthrokinematic, and capsule mobilizatin and passive range of motion techniques were applied to the involved upper extremity. neuromuscular down regulation to the myofascial soft tissue structures of the Glenohumeral and Scapulothoracic region. Strengthening/Endurance/ADL function/Neuromuscular reeducation activities/exercises supervised and completed per flow sheet. ROM      STRENGTHENING   NMR/PROPRIO   [x] pulleys sit/stand    [x] scapular rows: blue  [x] DAP eccentric scaption: 5#  [x] AAROM flexion with stick:1 #  [x] iso. IR/ER: org    [] bodyblade  [x] AAROM elevation TG ball roll  [x] AROM ant/post deltoid: pch [] BOW push-ups  []  1/2 foam roll walk    [x] UBE:  Man. 3'/3' @ lv 1.0  [] ABC's with BOW  []  IR with belt     [x] DAP bicep curls:5 #   [] DAP tricep press:  #  [x] DAP FWD press: 2.5#  Added/Changed Exercises:  []  Advanced as indicated in bold to address: [x] functional strength/ROM deficits [] balance/proprioceptive tasks  []  Modified: [] per subjective reports [] for patient time constraints [] for clinic time constraints      Modality:  []  E-Stim: type _ x _ min     []att   []unatt   []w/ice   []w/heat  []  Ultrasound: []cont   []pulse    _ W/cm2 x _  min   []1MHz   []3MHz  []  Ice pack: post      []  Hot pack: pre    []  Other:     Neuromuscular Re-education minutes:  []  Kinesiotaping for   [] Neuromuscular reeducation to the VMO with use of [place] electrical stimulation in conjunction with active contraction and exercises. []  balance/proprioceptive exercises and activities in clinic as listed on flow sheet above. Patient Education: [] Review HEP    [] Progressed/Changed HEP based on:  [] positioning   [] body mechanics   [] transfers   [] heat/ice application      ASSESSMENT  []  See progress note/recertification    End range discomfort and capsular tightness all directions but good GH mobility overall. She does fatigue quickly with strength progression in clinic, but is seeing improvements in AROM of the shoulder.     Progress towards goals / Updated goals:    PLAN  [x]  Upgrade activities as tolerated      [x]  Continue plan of care  []  Discharge due to:_  [] Other:_      Co-treatment by Truong Kenny, PTA 12/2/2021

## 2021-12-07 ENCOUNTER — OFFICE VISIT (OUTPATIENT)
Dept: ORTHOPEDIC SURGERY | Age: 71
End: 2021-12-07
Payer: COMMERCIAL

## 2021-12-07 DIAGNOSIS — M25.512 LEFT SHOULDER PAIN, UNSPECIFIED CHRONICITY: ICD-10-CM

## 2021-12-07 DIAGNOSIS — M67.912 ROTATOR CUFF DYSFUNCTION, LEFT: Primary | ICD-10-CM

## 2021-12-07 DIAGNOSIS — Z98.890 STATUS POST ARTHROSCOPY OF SHOULDER: ICD-10-CM

## 2021-12-07 PROCEDURE — 97110 THERAPEUTIC EXERCISES: CPT | Performed by: PHYSICAL THERAPIST

## 2021-12-07 NOTE — PROGRESS NOTES
PT DAILY TREATMENT NOTE    Patient Name: Ravindra Kelsey  Date:2021  : 1950  [x]  Patient  Verified  Payor: Michael De Paz / Plan: Vanessa Corbin / Product Type: HMO /    Total Treatment Time (min): 60  Total Timed Codes (min): 60  Visit #: 15 of 30    1. Status post arthroscopy of shoulder  2. Left shoulder pain, unspecified chronicity    SUBJECTIVE  Subjective functional status/changes:   [] No changes reported    Patient reports she is sore in the posterior shoulder as she has been doing more lifting around the house. OBJECTIVE    Therapeutic Exercise x 60 mins:   Osteokinematic, arthrokinematic, and capsule mobilizatin and passive range of motion techniques were applied to the involved upper extremity. neuromuscular down regulation to the myofascial soft tissue structures of the Glenohumeral and Scapulothoracic region. Strengthening/Endurance/ADL function/Neuromuscular reeducation activities/exercises supervised and completed per flow sheet. ROM      STRENGTHENING   NMR/PROPRIO   [x] pulleys sit/stand    [x] scapular rows: blue  [x] DAP eccentric scaption: 5#  [x] AAROM flexion with stick: 2#  [x] iso. IR/ER: org    [] bodyblade  [x] AAROM elevation TG ball roll  [x] AROM ant/post deltoid: pch [x] BOW push-ups  []  1/2 foam roll walk    [x] UBE:  Man. 3'/3' @ lv 1.0  [] ABC's with BOW  []  IR with belt     [x] DAP bicep curls:5 #   [] DAP tricep press:  #  [x] DAP FWD press: 2.5#  Added/Changed Exercises:  []  Advanced as indicated in bold to address: [x] functional strength/ROM deficits [] balance/proprioceptive tasks  []  Modified: [] per subjective reports [] for patient time constraints [] for clinic time constraints      Modality:  []  E-Stim: type _ x _ min     []att   []unatt   []w/ice   []w/heat  []  Ultrasound: []cont   []pulse    _ W/cm2 x _  min   []1MHz   []3MHz  [x]  Ice pack: post      [x]  Hot pack: pre    []  Other:     Neuromuscular Re-education minutes:  [] Kinesiotaping for   []  Neuromuscular reeducation to the VMO with use of [place] electrical stimulation in conjunction with active contraction and exercises. []  balance/proprioceptive exercises and activities in clinic as listed on flow sheet above. Patient Education: [] Review HEP    [] Progressed/Changed HEP based on:  [] positioning   [] body mechanics   [] transfers   [] heat/ice application      ASSESSMENT  []  See progress note/recertification    Tenderness along teres minor and infraspinatus and still with discomfort into end range overhead flexion stretching. She has no pain with ER mobility. Still with functional strength loss and fatigue with household ADLs /lift/carry.     Progress towards goals / Updated goals:    PLAN  [x]  Upgrade activities as tolerated      [x]  Continue plan of care  []  Discharge due to:_  [] Other:_      Co-treatment by Brad Huston, PTA 12/7/2021

## 2021-12-07 NOTE — PROGRESS NOTES
I have reviewed the notes, assessments, and/or procedures performed by Tawanna Farrar PTA, I concur with her/his documentation of Vero Coreas.

## 2021-12-09 ENCOUNTER — OFFICE VISIT (OUTPATIENT)
Dept: ORTHOPEDIC SURGERY | Age: 71
End: 2021-12-09
Payer: COMMERCIAL

## 2021-12-09 DIAGNOSIS — M25.512 LEFT SHOULDER PAIN, UNSPECIFIED CHRONICITY: ICD-10-CM

## 2021-12-09 DIAGNOSIS — G89.18 ACUTE POSTOPERATIVE PAIN OF LEFT SHOULDER: ICD-10-CM

## 2021-12-09 DIAGNOSIS — M67.912 ROTATOR CUFF DYSFUNCTION, LEFT: Primary | ICD-10-CM

## 2021-12-09 DIAGNOSIS — M25.512 ACUTE POSTOPERATIVE PAIN OF LEFT SHOULDER: ICD-10-CM

## 2021-12-09 PROCEDURE — 97110 THERAPEUTIC EXERCISES: CPT | Performed by: PHYSICAL THERAPIST

## 2021-12-09 NOTE — PROGRESS NOTES
PT DAILY TREATMENT NOTE    Patient Name: Micah Rowe  Date:2021  : 1950  [x]  Patient  Verified  Payor: Fayetta Hamman / Plan: Naila Wakefield / Product Type: HMO /    Total Treatment Time (min): 60  Total Timed Codes (min): 60  Visit #: 16 of 30    1. Status post arthroscopy of shoulder  2. Left shoulder pain, unspecified chronicity    SUBJECTIVE  Subjective functional status/changes:   [] No changes reported    Patient reports she still has some difficulty raising her arm out at her side. OBJECTIVE    Therapeutic Exercise x 60 mins:   Osteokinematic, arthrokinematic, and capsule mobilizatin and passive range of motion techniques were applied to the involved upper extremity. neuromuscular down regulation to the myofascial soft tissue structures of the Glenohumeral and Scapulothoracic region. Strengthening/Endurance/ADL function/Neuromuscular reeducation activities/exercises supervised and completed per flow sheet. ROM      STRENGTHENING   NMR/PROPRIO   [x] pulleys sit/stand    [x] scapular rows: blue  [x] DAP eccentric scaption: 5#  [x] AAROM flexion with stick: 2#  [x] iso. IR/ER: org    [x] bodyblade  [x] AAROM elevation TG ball roll  [x] AROM ant/post deltoid: pch [x] BOW push-ups  []  1/2 foam roll walk    [x] UBE:  Man. 3'/3' @ lv 1.0  [] ABC's with BOW  []  IR with belt     [x] DAP bicep curls:5 #   [] DAP tricep press:  #  [x] DAP FWD press: 2.5#  Added/Changed Exercises:  [x]  Advanced as indicated in bold to address: [x] functional strength/ROM deficits [] balance/proprioceptive tasks  []  Modified: [] per subjective reports [] for patient time constraints [] for clinic time constraints      Modality:  []  E-Stim: type _ x _ min     []att   []unatt   []w/ice   []w/heat  []  Ultrasound: []cont   []pulse    _ W/cm2 x _  min   []1MHz   []3MHz  [x]  Ice pack: post      [x]  Hot pack: pre    []  Other:     Neuromuscular Re-education minutes:  []  Kinesiotaping for   [] Neuromuscular reeducation to the VMO with use of [place] electrical stimulation in conjunction with active contraction and exercises. []  balance/proprioceptive exercises and activities in clinic as listed on flow sheet above. Patient Education: [] Review HEP    [] Progressed/Changed HEP based on:  [] positioning   [] body mechanics   [] transfers   [] heat/ice application      ASSESSMENT  []  See progress note/recertification    Posterior capsule rightness with end range IR but overall PROM is WNL with some active weakness into all directions. Challenged by but no pain with today's exercise progression.     Progress towards goals / Updated goals:    PLAN  [x]  Upgrade activities as tolerated      [x]  Continue plan of care  []  Discharge due to:_  [] Other:_      Co-treatment by Ale Gonzales, PTA 12/9/2021

## 2021-12-10 NOTE — PROGRESS NOTES
I have reviewed the notes, assessments, and/or procedures performed by Christian De La Torre PTA, I concur with her/his documentation of Jo-Ann Pacheco.

## 2021-12-13 ENCOUNTER — OFFICE VISIT (OUTPATIENT)
Dept: ORTHOPEDIC SURGERY | Age: 71
End: 2021-12-13
Payer: COMMERCIAL

## 2021-12-13 DIAGNOSIS — G89.18 ACUTE POSTOPERATIVE PAIN OF LEFT SHOULDER: ICD-10-CM

## 2021-12-13 DIAGNOSIS — M67.912 ROTATOR CUFF DYSFUNCTION, LEFT: Primary | ICD-10-CM

## 2021-12-13 DIAGNOSIS — M25.512 ACUTE POSTOPERATIVE PAIN OF LEFT SHOULDER: ICD-10-CM

## 2021-12-13 PROCEDURE — 97110 THERAPEUTIC EXERCISES: CPT | Performed by: PHYSICAL THERAPIST

## 2021-12-13 NOTE — PROGRESS NOTES
PT DAILY TREATMENT NOTE    Patient Name: Yohana Grover  Date:2021  : 1950  [x]  Patient  Verified  Payor: Vida Mosquera / Plan: Mady Tovar / Product Type: HMO /    Total Treatment Time (min): 60  Total Timed Codes (min): 60      1. Status post arthroscopy of shoulder  2. Left shoulder pain, unspecified chronicity    SUBJECTIVE  Subjective functional status/changes:   [] No changes reported    Still having general pain/soreness with ADL activity. Has questions regarding ADL do's/don'ts    OBJECTIVE    Therapeutic Exercise x 60 mins:   Osteokinematic, arthrokinematic, and capsular mobilization with passive range of motion exercise techniques were applied to the involved upper extremity. neuromuscular down regulation to the myofascial soft tissue structures of the Glenohumeral and Scapulothoracic region. Manual assistance with seated AROM/AAROM. Strengthening/Endurance/ADL function/Neuromuscular reeducation activities/exercises supervised and completed per flow sheet. ROM      STRENGTHENING   NMR/PROPRIO   [x] pulleys sit/stand    [x] scapular rows: blue  [x] DAP eccentric scaption: 5#  [x] AAROM flexion with stick: 2#  [x] iso. IR/ER: org    [x] bodyblade  [x] AAROM elevation TG ball roll  [x] AROM ant/post deltoid: pch [x] BOW push-ups  []  1/2 foam roll walk    [x] UBE:  Man. 4'/4' @ lv 1.0  [] ABC's with BOW  []  IR with belt     [x] DAP bicep curls:5 #   [] DAP tricep press:  #  [x] DAP FWD press: 2.5#  Added/Changed Exercises:  [x]  Advanced as indicated in bold to address: [x] functional strength/ROM deficits [] balance/proprioceptive tasks  []  Modified: [] per subjective reports [] for patient time constraints [] for clinic time constraints      Modality:  []  E-Stim: type _ x _ min     []att   []unatt   []w/ice   []w/heat  []  Ultrasound: []cont   []pulse    _ W/cm2 x _  min   []1MHz   []3MHz  [x]  Ice pack: post      [x]  Hot pack: pre    []  Other:     Neuromuscular Re-education minutes:  []  Kinesiotaping for   []  Neuromuscular reeducation to the VMO with use of [place] electrical stimulation in conjunction with active contraction and exercises. []  balance/proprioceptive exercises and activities in clinic as listed on flow sheet above. Patient Education: [] Review HEP    [] Progressed/Changed HEP based on:  [] positioning   [] body mechanics   [] transfers   [] heat/ice application      ASSESSMENT    Still with appropriate passive ER/IR limitations. Good active elevation for this point in rehab timeline but still with expected weakness.     PLAN  [x]  Upgrade activities as tolerated      [x]  Continue plan of care  []  Discharge due to:_  [] Other:_       Katerina Traore PT, DPT 12/12/2021

## 2021-12-16 ENCOUNTER — OFFICE VISIT (OUTPATIENT)
Dept: ORTHOPEDIC SURGERY | Age: 71
End: 2021-12-16
Payer: COMMERCIAL

## 2021-12-16 DIAGNOSIS — M25.512 ACUTE POSTOPERATIVE PAIN OF LEFT SHOULDER: ICD-10-CM

## 2021-12-16 DIAGNOSIS — M25.512 LEFT SHOULDER PAIN, UNSPECIFIED CHRONICITY: Primary | ICD-10-CM

## 2021-12-16 DIAGNOSIS — M67.912 ROTATOR CUFF DYSFUNCTION, LEFT: ICD-10-CM

## 2021-12-16 DIAGNOSIS — G89.18 ACUTE POSTOPERATIVE PAIN OF LEFT SHOULDER: ICD-10-CM

## 2021-12-16 PROCEDURE — 97110 THERAPEUTIC EXERCISES: CPT | Performed by: PHYSICAL THERAPIST

## 2021-12-16 NOTE — PROGRESS NOTES
I have reviewed the notes, assessments, and/or procedures performed by Min Heal PTA, I concur with her/his documentation of Matt Rodriguez

## 2021-12-16 NOTE — PROGRESS NOTES
PT DAILY TREATMENT NOTE    Patient Name: Marilu Zamora  Date:2021  : 1950  [x]  Patient  Verified  Payor: Shayy Cummins / Plan: Rachel Anderson / Product Type: HMO /    Total Treatment Time (min): 60  Total Timed Codes (min): 60  Visit #: 18 of 30    1. Status post arthroscopy of shoulder  2. Left shoulder pain, unspecified chronicity    SUBJECTIVE  Subjective functional status/changes:   [] No changes reported    Patient reports she is moving her shoulder more easily, but still feels weak reaching away from her body. OBJECTIVE    Therapeutic Exercise x 60 mins:   Osteokinematic, arthrokinematic, and capsule mobilizatin and passive range of motion techniques were applied to the involved upper extremity. neuromuscular down regulation to the myofascial soft tissue structures of the Glenohumeral and Scapulothoracic region. AROM   Elevation 150  ER T1  IR T12    Strengthening/Endurance/ADL function/Neuromuscular reeducation activities/exercises supervised and completed per flow sheet. ROM      STRENGTHENING   NMR/PROPRIO   [x] pulleys sit/stand    [x] scapular rows: blue  [x] DAP eccentric scaption: 5#  [x] AAROM flexion with stick: 3#  [x] iso. IR/ER: org    [x] bodyblade  [x] AAROM elevation on wall  [x] AROM ant/post deltoid: pch [x] BOW push-ups  []  1/2 foam roll walk    [x] UBE:  Man. 3'/3' @ lv 1.0  [] ABC's with BOW  []  IR with belt     [x] DAP bicep curls:5 #   [] DAP tricep press:  #  [x] DAP FWD press: 5#  Added/Changed Exercises:  [x]  Advanced as indicated in bold to address: [x] functional strength/ROM deficits [] balance/proprioceptive tasks  []  Modified: [] per subjective reports [] for patient time constraints [] for clinic time constraints      Modality:  []  E-Stim: type _ x _ min     []att   []unatt   []w/ice   []w/heat  []  Ultrasound: []cont   []pulse    _ W/cm2 x _  min   []1MHz   []3MHz  [x]  Ice pack: post      [x]  Hot pack: pre    []  Other:     Neuromuscular Re-education minutes:  []  Kinesiotaping for   []  Neuromuscular reeducation to the VMO with use of [place] electrical stimulation in conjunction with active contraction and exercises. []  balance/proprioceptive exercises and activities in clinic as listed on flow sheet above. Patient Education: [] Review HEP    [] Progressed/Changed HEP based on:  [] positioning   [] body mechanics   [] transfers   [] heat/ice application      ASSESSMENT  []  See progress note/recertification    She has excellent PROM all directions. Mild posterior capsule tightness that improves with LLPS. We are working on increasing strength for functional reach/carry/lift tasks.     Progress towards goals / Updated goals:    PLAN  [x]  Upgrade activities as tolerated      [x]  Continue plan of care  []  Discharge due to:_  [] Other:_      Co-treatment by Ata Man, JOSE DANIEL 12/16/2021

## 2021-12-21 ENCOUNTER — OFFICE VISIT (OUTPATIENT)
Dept: ORTHOPEDIC SURGERY | Age: 71
End: 2021-12-21
Payer: COMMERCIAL

## 2021-12-21 DIAGNOSIS — M25.512 LEFT SHOULDER PAIN, UNSPECIFIED CHRONICITY: ICD-10-CM

## 2021-12-21 DIAGNOSIS — M67.912 ROTATOR CUFF DYSFUNCTION, LEFT: ICD-10-CM

## 2021-12-21 DIAGNOSIS — M25.512 ACUTE POSTOPERATIVE PAIN OF LEFT SHOULDER: Primary | ICD-10-CM

## 2021-12-21 DIAGNOSIS — G89.18 ACUTE POSTOPERATIVE PAIN OF LEFT SHOULDER: Primary | ICD-10-CM

## 2021-12-21 PROCEDURE — 97110 THERAPEUTIC EXERCISES: CPT | Performed by: PHYSICAL THERAPIST

## 2021-12-21 NOTE — PROGRESS NOTES
PT DAILY TREATMENT NOTE    Patient Name: Deborah Juarez  Date:2021  : 1950  [x]  Patient  Verified  Payor: Edilia Vincent / Plan: Jose Roa / Product Type: HMO /    Total Treatment Time (min): 60  Total Timed Codes (min): 60  Visit #:     1. Status post arthroscopy of shoulder  2. Left shoulder pain, unspecified chronicity    SUBJECTIVE  Subjective functional status/changes:   [] No changes reported    Patient reports she has been doing a lot more around the house with her arm with very little pain/soreness. OBJECTIVE    Therapeutic Exercise x 60 mins:   Osteokinematic, arthrokinematic, and capsule mobilizatin and passive range of motion techniques were applied to the involved upper extremity. neuromuscular down regulation to the myofascial soft tissue structures of the Glenohumeral and Scapulothoracic region. Strengthening/Endurance/ADL function/Neuromuscular reeducation activities/exercises supervised and completed per flow sheet.    ROM      STRENGTHENING   NMR/PROPRIO   [x] pulleys sit/stand    [x] scapular rows: blue  [x] DAP eccentric scaption: 5#  [x] AAROM flexion with stick: 3#  [x] AROM IR/ER: org    [x] bodyblade  [x] AAROM elevation on wall   [x] AROM ant/post deltoid: pch [x] BOW push-ups  []  1/2 foam roll walk    [x] UBE:  Man. 3'/3' @ lv 1.0  [x] ABC's with BOW  []  IR with belt     [x] DAP bicep curls:5 #   [] DAP tricep press:  #  [x] DAP FWD press: 5#  Added/Changed Exercises:  [x]  Advanced as indicated in bold to address: [x] functional strength/ROM deficits [] balance/proprioceptive tasks  []  Modified: [] per subjective reports [] for patient time constraints [] for clinic time constraints      Modality:  []  E-Stim: type _ x _ min     []att   []unatt   []w/ice   []w/heat  []  Ultrasound: []cont   []pulse    _ W/cm2 x _  min   []1MHz   []3MHz  [x]  Ice pack: post      [x]  Hot pack: pre    []  Other:     Neuromuscular Re-education minutes:  [] Kinesiotaping for   []  Neuromuscular reeducation to the VMO with use of [place] electrical stimulation in conjunction with active contraction and exercises. []  balance/proprioceptive exercises and activities in clinic as listed on flow sheet above. Patient Education: [] Review HEP    [] Progressed/Changed HEP based on:  [] positioning   [] body mechanics   [] transfers   [] heat/ice application      ASSESSMENT  []  See progress note/recertification    Challenged by exercise progression to address strength at/above shoulder height. Pain free with ROM stretching.     Progress towards goals / Updated goals:    PLAN  [x]  Upgrade activities as tolerated      [x]  Continue plan of care  []  Discharge due to:_  [] Other:_      Co-treatment by Mila Florez, PTA 12/21/2021

## 2021-12-21 NOTE — PROGRESS NOTES
I have reviewed the notes, assessments, and/or procedures performed by Fidel Boyd PTA, I concur with her/his documentation of Jose Luis Goods.

## 2021-12-22 ENCOUNTER — OFFICE VISIT (OUTPATIENT)
Dept: ORTHOPEDIC SURGERY | Age: 71
End: 2021-12-22
Payer: COMMERCIAL

## 2021-12-22 VITALS — BODY MASS INDEX: 33.38 KG/M2 | HEIGHT: 60 IN | WEIGHT: 170 LBS

## 2021-12-22 DIAGNOSIS — M70.61 TROCHANTERIC BURSITIS OF BOTH HIPS: Primary | ICD-10-CM

## 2021-12-22 DIAGNOSIS — M22.42 CHONDROMALACIA OF LEFT PATELLA: ICD-10-CM

## 2021-12-22 DIAGNOSIS — M22.41 CHONDROMALACIA OF RIGHT PATELLA: ICD-10-CM

## 2021-12-22 DIAGNOSIS — M70.62 TROCHANTERIC BURSITIS OF BOTH HIPS: Primary | ICD-10-CM

## 2021-12-22 DIAGNOSIS — Z98.890 STATUS POST ARTHROSCOPY OF SHOULDER: ICD-10-CM

## 2021-12-22 PROCEDURE — 99212 OFFICE O/P EST SF 10 MIN: CPT | Performed by: ORTHOPAEDIC SURGERY

## 2021-12-22 NOTE — PROGRESS NOTES
Diana Beltran (: 1950) is a 73 y.o. female, established patient, here for evaluation of the following chief complaint(s):  Shoulder Pain (left)       ASSESSMENT/PLAN:  Below is the assessment and plan developed based on review of pertinent history, physical exam, labs, studies, and medications. Findings were discussed with the patient today. We discussed regimen of ice, anti-inflammatories, physical therapy, home exercise program, and activity modifications. If there is continued pain and symptoms then we will plan for follow-up in the next 4-6 weeks for further evaluation and treatment planning. She will slowly progress out of therapy over the next month. 1. Trochanteric bursitis of both hips  2. Status post arthroscopy of shoulder  3. Chondromalacia of left patella  4. Chondromalacia of right patella      Return if symptoms worsen or fail to improve. SUBJECTIVE/OBJECTIVE:  Diana Beltran (: 1950) is a 73 y.o. female. She notes that her shoulder continues to improve as she works with physical therapy and progresses her home exercise regimen. She also notes some continued trochanteric bursitis symptoms and anterior knee pain        Allergies   Allergen Reactions    Morphine Other (comments)     Cold sweats & nightmares       Current Outpatient Medications   Medication Sig    aspirin (Timur Chewable Aspirin) 81 mg chewable tablet Take 81 mg by mouth daily.  calcium-vits I9-N-Q1-minerals 166.75 mg- 166.75 unit cap calcium    TURMERIC PO turmeric    amoxicillin (AMOXIL) 500 mg capsule     omeprazole (PRILOSEC) 40 mg capsule     oxybutynin chloride XL (DITROPAN XL) 10 mg CR tablet     losartan (COZAAR) 100 mg tablet Take 100 mg by mouth daily.  cholecalciferol (VITAMIN D3) 400 unit tab tablet Take 1,000 Units by mouth daily.  montelukast (SINGULAIR) 10 mg tablet TAKE 1 TABLET BY MOUTH EVERY DAY    atorvastatin (LIPITOR) 40 mg tablet Take 1 Tab by mouth daily.     albuterol (PROAIR HFA) 90 mcg/Actuation inhaler TAKE 2 PUFFS BY INHALATION EVERY FOUR (4) HOURS AS NEEDED FOR WHEEZING.  GLUC BALDERRAMA 2KCL/MSM/CSA/ROSA/ (GLUCOSAMIN-CHOND-MSM-ROSA-115HC PO) Take  by mouth.  MULTI-VITAMIN PO Take  by mouth.  diclofenac (VOLTAREN) 1 % gel diclofenac 1 % topical gel   APPLY 2 GRAM TO THE AFFECTED AREA(S) BY TOPICAL ROUTE 4 TIMES PER DAY (Patient not taking: Reported on 2021)    aspirin delayed-release (ASPIR-81) 81 mg tablet Take  by mouth daily. No current facility-administered medications for this visit. Social History     Socioeconomic History    Marital status:      Spouse name: Not on file    Number of children: Not on file    Years of education: Not on file    Highest education level: Not on file   Occupational History    Not on file   Tobacco Use    Smoking status: Former Smoker     Packs/day: 0.25     Years: 30.00     Pack years: 7.50     Quit date: 10/1/2002     Years since quittin.2    Smokeless tobacco: Never Used    Tobacco comment: on and off for 30 years   Substance and Sexual Activity    Alcohol use: Yes     Alcohol/week: 0.8 standard drinks     Types: 1 Glasses of wine per week     Comment: maybe 1 glass/week    Drug use: No    Sexual activity: Not Currently   Other Topics Concern    Not on file   Social History Narrative    Not on file     Social Determinants of Health     Financial Resource Strain:     Difficulty of Paying Living Expenses: Not on file   Food Insecurity:     Worried About Running Out of Food in the Last Year: Not on file    Jyothi of Food in the Last Year: Not on file   Transportation Needs:     Lack of Transportation (Medical): Not on file    Lack of Transportation (Non-Medical):  Not on file   Physical Activity:     Days of Exercise per Week: Not on file    Minutes of Exercise per Session: Not on file   Stress:     Feeling of Stress : Not on file   Social Connections:     Frequency of Communication with Friends and Family: Not on file    Frequency of Social Gatherings with Friends and Family: Not on file    Attends Synagogue Services: Not on file    Active Member of Clubs or Organizations: Not on file    Attends Club or Organization Meetings: Not on file    Marital Status: Not on file   Intimate Partner Violence:     Fear of Current or Ex-Partner: Not on file    Emotionally Abused: Not on file    Physically Abused: Not on file    Sexually Abused: Not on file   Housing Stability:     Unable to Pay for Housing in the Last Year: Not on file    Number of [place] in the Last Year: Not on file    Unstable Housing in the Last Year: Not on file       Past Surgical History:   Procedure Laterality Date    ENDOSCOPY, COLON, DIAGNOSTIC  6/04    due 6/14    HX BREAST BIOPSY  6/06    BENIGN    HX GI      COLONOSCOPY    HX GYN  10/01    thermal ablation    [address]    clogged milk duct    NH CARDIAC SURG PROCEDURE UNLIST  2014    CARDIAC CATH       Family History   Problem Relation Age of Onset    Alcohol abuse Mother     Heart Disease Mother     Hypertension Mother    Barraza Elevated Lipids Mother     Alcohol abuse Father     Heart Disease Father     Diabetes Father     Elevated Lipids Father     Hypertension Father     Cancer Paternal Uncle         lung    Heart Disease Brother 46        MI, PTCA/stent    Alcohol abuse Brother     Cancer Daughter         skin    Alcohol abuse Son     Heart Disease Maternal Aunt     Heart Disease Maternal Uncle     Cancer Paternal Aunt         uterine    Heart Disease Maternal Aunt     Heart Disease Maternal Aunt     Heart Disease Maternal Aunt     Heart Disease Paternal Aunt         OB History    No obstetric history on file.             REVIEW OF SYSTEMS:  ROS     Positive for: Musculoskeletal    Last edited by Fiorella Interiano on 12/22/2021 11:02 AM. (History)        Patient denies any recent fever, chills, nausea, vomiting, chest pain, or shortness of breath. Vitals:  Ht 5' (1.524 m)   Wt 170 lb (77.1 kg)   BMI 33.20 kg/m²    Body mass index is 33.2 kg/m². PHYSICAL EXAM:  General exam: Patient is awake, alert, and oriented x3. Well-appearing. No acute distress. Ambulates with a normal gait. Left shoulder: Neurovascular and sensory intact. She has improved range of motion and strength. Normal stability    Bilateral hips: Neurovascular and sensory intact. There is point tenderness palpation at the lateral hip in the area of the trochanteric bursa. Mild swelling. No erythema or ecchymosis. There is normal range of motion of the hip with some lateral sided pain with full flexion and internal rotation. Normal stability. No crepitus. Bilateral knees: Neurovascular and sensory intact. There is tenderness to palpation in the parapatellar region. There is crepitus with range of motion. No significant effusion noted. There is no joint line tenderness to palpation. Brooks's maneuver is nonpainful. Normal stability on Lachman's exam and anterior/posterior drawer testing. No erythema or ecchymosis. IMAGING:    XR Results (most recent):  Results from Hospital Encounter encounter on 09/16/21    XR ARTHRO SHOULDER LT    Narrative  Clinical indication: Shoulder dislocation left. With fluoroscopy assistance, after adequate skin preparation local anesthesia,  22-gauge needle was placed into the left shoulder joint, 15 cc of Isovue 180  were injected. After mild exercise shoulder arthrogram show normal joint  capacity mild abnormality of the synovial lining but no extravasation to suggest  a tear. CT is planned for further evaluation. Impression  No evidence for a rotator cuff tear. CT to follow. FLUOROSCOPY DOSE (air kerma): 40.25 mGy      Results from Hospital Encounter encounter on 02/29/16    XR CHEST PORT    Narrative  **Final Report**      ICD Codes / Adm. Diagnosis: 986784 [id number] / Abdominal Pain  Syncope  Examination:  CR CHEST PORT  - [id number] - Feb 29 2016  1:50AM  Accession No:  [id number]  Reason:  pain/syncope      REPORT:  INDICATION:  pain/syncope    EXAM: Single portable view of chest 0145 hours dated 2/29/2016. Comparison  August 20, 2014. Findings: Cardiac silhouette is enlarged. Pulmonary vasculature is not  engorged. There are no focal parenchymal opacities, effusions, or  pneumothorax. Left chest pacer unchanged    Impression  :  1. Enlarged cardiac silhouette, otherwise no acute disease          Signing/Reading Doctor: Jeff Dotson ([id number])  Approved: Jeff Dotson ([id number])  Feb 29 2016  1:53AM      Results from Hospital Encounter encounter on 08/20/14    XR CHEST PORT    Narrative  **Final Report**      ICD Codes / Adm. Diagnosis: V43.3  V72.82 / revision  Examination:  CR CHEST PORT  - [id number] - Aug 20 2014 10:47AM  Accession No:  [id number]  Reason:  Evaluate for pneumothorax      REPORT:  EXAM:  CR CHEST PORT    INDICATION:  Evaluate for pneumothorax    COMPARISON:  7/19/2014    FINDINGS:    A portable AP radiograph of the chest was obtained at 10:45 hours. There is  no evidence of pneumothorax. The lungs are clear. There is unchanged  enlargement of the cardiac silhouette. A heart valve prosthesis which  appears to be the aortic valve is noted. There is a dual-chamber pacemaker  which appears intact. No vascular congestion or pleural fluid are  demonstrated. The previous left basilar density has resolved. Impression  :    No evidence of pneumothorax. Cardiomegaly without acute cardiopulmonary  disease. Signing/Reading Doctor: Ulises Morgan ([id number])  Approved: Ulises Morgan ([id number])  Aug 20 2014 11:47AM         No orders of the defined types were placed in this encounter. An electronic signature was used to authenticate this note.   -- Rahat Murguia DO

## 2021-12-22 NOTE — PATIENT INSTRUCTIONS
Knee: Exercises  Introduction  Here are some examples of exercises for you to try. The exercises may be suggested for a condition or for rehabilitation. Start each exercise slowly. Ease off the exercises if you start to have pain. You will be told when to start these exercises and which ones will work best for you. How to do the exercises  Quad sets    1. Sit with your leg straight and supported on the floor or a firm bed. (If you feel discomfort in the front or back of your knee, place a small towel roll under your knee.)  2. Tighten the muscles on top of your thigh by pressing the back of your knee flat down to the floor. (If you feel discomfort under your kneecap, place a small towel roll under your knee.)  3. Hold for about 6 seconds, then rest for up to 10 seconds. 4. Do 8 to 12 repetitions several times a day. Straight-leg raises to the front    1. Lie on your back with your good knee bent so that your foot rests flat on the floor. Your injured leg should be straight. Make sure that your low back has a normal curve. You should be able to slip your flat hand in between the floor and the small of your back, with your palm touching the floor and your back touching the back of your hand. 2. Tighten the thigh muscles in the injured leg by pressing the back of your knee flat down to the floor. Hold your knee straight. 3. Keeping the thigh muscles tight, lift your injured leg up so that your heel is about 12 inches off the floor. Hold for about 6 seconds and then lower slowly. 4. Do 8 to 12 repetitions, 3 times a day. Straight-leg raises to the outside    1. Lie on your side, with your injured leg on top. 2. Tighten the front thigh muscles of your injured leg to keep your knee straight. 3. Keep your hip and your leg straight in line with the rest of your body, and keep your knee pointing forward. Do not drop your hip back.   4. Lift your injured leg straight up toward the ceiling, about 12 inches off the floor. Hold for about 6 seconds, then slowly lower your leg. 5. Do 8 to 12 repetitions. Straight-leg raises to the back    1. Lie on your stomach, and lift your leg straight up behind you (toward the ceiling). 2. Lift your toes about 6 inches off the floor, hold for about 6 seconds, then lower slowly. 3. Do 8 to 12 repetitions. Straight-leg raises to the inside    1. Lie on the side of your body with the injured leg. 2. You can either prop your other (good) leg up on a chair, or you can bend your good knee and put that foot in front of your injured knee. Do not drop your hip back. 3. Tighten the muscles on the front of your thigh to straighten your injured knee. 4. Keep your kneecap pointing forward, and lift your whole leg up toward the ceiling about 6 inches. Hold for about 6 seconds, then lower slowly. 5. Do 8 to 12 repetitions. Heel dig bridging    1. Lie on your back with both knees bent and your ankles bent so that only your heels are digging into the floor. Your knees should be bent about 90 degrees. 2. Then push your heels into the floor, squeeze your buttocks, and lift your hips off the floor until your shoulders, hips, and knees are all in a straight line. 3. Hold for about 6 seconds as you continue to breathe normally, and then slowly lower your hips back down to the floor and rest for up to 10 seconds. 4. Do 8 to 12 repetitions. Hamstring curls    1. Lie on your stomach with your knees straight. If your kneecap is uncomfortable, roll up a washcloth and put it under your leg just above your kneecap. 2. Lift the foot of your injured leg by bending the knee so that you bring the foot up toward your buttock. If this motion hurts, try it without bending your knee quite as far. This may help you avoid any painful motion. 3. Slowly lower your leg back to the floor. 4. Do 8 to 12 repetitions.   5. With permission from your doctor or physical therapist, you may also want to add a cuff weight to your ankle (not more than 5 pounds). With weight, you do not have to lift your leg more than 12 inches to get a hamstring workout. Shallow standing knee bends    Do this exercise only if you have very little pain; if you have no clicking, locking, or giving way if you have an injured knee; and if it does not hurt while you are doing 8 to 12 repetitions. 1. Stand with your hands lightly resting on a counter or chair in front of you. Put your feet shoulder-width apart. 2. Slowly bend your knees so that you squat down like you are going to sit in a chair. Make sure your knees do not go in front of your toes. 3. Lower yourself about 6 inches. Your heels should remain on the floor at all times. 4. Rise slowly to a standing position. Heel raises    1. Stand with your feet a few inches apart, with your hands lightly resting on a counter or chair in front of you. 2. Slowly raise your heels off the floor while keeping your knees straight. 3. Hold for about 6 seconds, then slowly lower your heels to the floor. 4. Do 8 to 12 repetitions several times during the day. Follow-up care is a key part of your treatment and safety. Be sure to make and go to all appointments, and call your doctor if you are having problems. It's also a good idea to know your test results and keep a list of the medicines you take. Where can you learn more? Go to http://www.gray.com/  Enter J800 in the search box to learn more about \"Knee: Exercises. \"  Current as of: July 1, 2021               Content Version: 13.0  © 4463-6126 Healthwise, Incorporated. Care instructions adapted under license by [employer] (which disclaims liability or warranty for this information). If you have questions about a medical condition or this instruction, always ask your healthcare professional. [address] any warranty or liability for your use of this information.            Hip Bursitis: Exercises  Introduction  Here are some examples of exercises for you to try. The exercises may be suggested for a condition or for rehabilitation. Start each exercise slowly. Ease off the exercises if you start to have pain. You will be told when to start these exercises and which ones will work best for you. How to do the exercises  Hip rotator stretch    1. Lie on your back with both knees bent and your feet flat on the floor. 2. Put the ankle of your affected leg on your opposite thigh near your knee. 3. Use your hand to gently push your knee away from your body until you feel a gentle stretch around your hip. 4. Hold the stretch for 15 to 30 seconds. 5. Repeat 2 to 4 times. 6. Repeat steps 1 through 5, but this time use your hand to gently pull your knee toward your opposite shoulder. Iliotibial band stretch    1. Lean sideways against a wall. If you are not steady on your feet, hold on to a chair or counter. 2. Stand on the leg with the affected hip, with that leg close to the wall. Then cross your other leg in front of it. 3. Let your affected hip drop out to the side of your body and against wall. Then lean away from your affected hip until you feel a stretch. 4. Hold the stretch for 15 to 30 seconds. 5. Repeat 2 to 4 times. Straight-leg raises to the outside    1. Lie on your side, with your affected hip on top. 2. Tighten the front thigh muscles of your top leg to keep your knee straight. 3. Keep your hip and your leg straight in line with the rest of your body, and keep your knee pointing forward. Do not drop your hip back. 4. Lift your top leg straight up toward the ceiling, about 12 inches off the floor. Hold for about 6 seconds, then slowly lower your leg. 5. Repeat 8 to 12 times. Clamshell    1. Lie on your side, with your affected hip on top and your head propped on a pillow. Keep your feet and knees together and your knees bent. 2. Raise your top knee, but keep your feet together. Do not let your hips roll back. Your legs should open up like a clamshell. 3. Hold for 6 seconds. 4. Slowly lower your knee back down. Rest for 10 seconds. 5. Repeat 8 to 12 times. Follow-up care is a key part of your treatment and safety. Be sure to make and go to all appointments, and call your doctor if you are having problems. It's also a good idea to know your test results and keep a list of the medicines you take. Where can you learn more? Go to http://www.davis.com/  Enter H674 in the search box to learn more about \"Hip Bursitis: Exercises. \"  Current as of: July 1, 2021               Content Version: 13.0  © 2006-2021 Healthwise, Incorporated. Care instructions adapted under license by [employer] (which disclaims liability or warranty for this information). If you have questions about a medical condition or this instruction, always ask your healthcare professional. [address] any warranty or liability for your use of this information.

## 2021-12-22 NOTE — LETTER
12/22/2021    Patient: Dio Yoo   YOB: 1950   Date of Visit: 12/22/2021     Nela Garland MD  [address]  [address] [address]  Via Fax: [phone]    Dear Nela Garland MD,      Thank you for referring Ms. Janay Reeves to [facility] for evaluation. My notes for this consultation are attached. If you have questions, please do not hesitate to call me. I look forward to following your patient along with you.       Sincerely,    Cathy Jefferson, DO

## 2021-12-23 ENCOUNTER — OFFICE VISIT (OUTPATIENT)
Dept: ORTHOPEDIC SURGERY | Age: 71
End: 2021-12-23
Payer: COMMERCIAL

## 2021-12-23 DIAGNOSIS — Z98.890 STATUS POST ARTHROSCOPY OF SHOULDER: ICD-10-CM

## 2021-12-23 DIAGNOSIS — M67.912 ROTATOR CUFF DYSFUNCTION, LEFT: Primary | ICD-10-CM

## 2021-12-23 PROCEDURE — 97110 THERAPEUTIC EXERCISES: CPT | Performed by: PHYSICAL THERAPIST

## 2021-12-23 NOTE — PROGRESS NOTES
PT DAILY TREATMENT NOTE    Patient Name: Margie Hagen  Date:2021  : 1950  [x]  Patient  Verified  Payor: Cruz Menjivar / Plan: Kirsty Ness / Product Type: HMO /    Total Treatment Time (min): 60  Total Timed Codes (min): 60  Visit #: 20 of 30    1. Status post arthroscopy of shoulder  2. Left shoulder pain, unspecified chronicity    SUBJECTIVE  Subjective functional status/changes:   [] No changes reported    Patient reports she still has varying levels of soreness in the shoulder with activity, but does not have any true pain. OBJECTIVE    Therapeutic Exercise x 60 mins:   Osteokinematic, arthrokinematic, and capsule mobilizatin and passive range of motion techniques were applied to the involved upper extremity. neuromuscular down regulation to the myofascial soft tissue structures of the Glenohumeral and Scapulothoracic region. Strengthening/Endurance/ADL function/Neuromuscular reeducation activities/exercises supervised and completed per flow sheet.    ROM      STRENGTHENING   NMR/PROPRIO   [x] pulleys sit/stand    [x] scapular rows: blue  [x] DAP eccentric scaption: 5#  [x] AAROM flexion with stick: 3#  [x] AROM IR/ER: org    [x] bodyblade  [x] AAROM elevation on wall   [x] AROM ant/post deltoid: pch [x] BOW push-ups  []  1/2 foam roll walk    [x] UBE:  Man. 3'/3' @ lv 1.0  [x] ABC's with BOW  []  IR with belt     [x] DAP bicep curls:5 #   [] DAP tricep press:  #  [x] DAP FWD press: 5#  Added/Changed Exercises:  []  Advanced as indicated in bold to address: [] functional strength/ROM deficits [] balance/proprioceptive tasks  []  Modified: [] per subjective reports [] for patient time constraints [] for clinic time constraints      Modality:  []  E-Stim: type _ x _ min     []att   []unatt   []w/ice   []w/heat  []  Ultrasound: []cont   []pulse    _ W/cm2 x _  min   []1MHz   []3MHz  [x]  Ice pack: post      []  Hot pack: pre    []  Other:     Neuromuscular Re-education minutes:  [] Kinesiotaping for   []  Neuromuscular reeducation to the VMO with use of [place] electrical stimulation in conjunction with active contraction and exercises. []  balance/proprioceptive exercises and activities in clinic as listed on flow sheet above. Patient Education: [] Review HEP    [x] Progressed/Changed HEP with orange theraband for strengthening  [] positioning   [] body mechanics   [] transfers   [] heat/ice application      ASSESSMENT  []  See progress note/recertification    Doing well with exercises in clinic and ROM all directions. We will decrease frequency to 1x/wk and increase HEP to progress strength working towards goals for D/C.     Progress towards goals / Updated goals:    PLAN  [x]  Upgrade activities as tolerated      [x]  Continue plan of care  []  Discharge due to:_  [] Other:_      Co-treatment by Chanell Forbes, JOSE DANIEL 12/23/2021

## 2021-12-30 ENCOUNTER — OFFICE VISIT (OUTPATIENT)
Dept: ORTHOPEDIC SURGERY | Age: 71
End: 2021-12-30
Payer: COMMERCIAL

## 2021-12-30 DIAGNOSIS — Z98.890 STATUS POST ARTHROSCOPY OF SHOULDER: ICD-10-CM

## 2021-12-30 DIAGNOSIS — M67.912 ROTATOR CUFF DYSFUNCTION, LEFT: Primary | ICD-10-CM

## 2021-12-30 PROCEDURE — 97110 THERAPEUTIC EXERCISES: CPT | Performed by: PHYSICAL THERAPIST

## 2021-12-30 NOTE — PROGRESS NOTES
PT DAILY TREATMENT NOTE    Patient Name: Sherine Garcia  Date:2021  : 1950  [x]  Patient  Verified  Payor: Haley Rizzo / Plan: Andre Booth / Product Type: HMO /    Total Treatment Time (min): 60  Total Timed Codes (min): 60  Visit #:     1. Status post arthroscopy of shoulder  2. Left shoulder pain, unspecified chronicity    SUBJECTIVE  Subjective functional status/changes:   [] No changes reported    Patient reports she still has some weakness/difficulty when reaching out to her side to grab objects. OBJECTIVE    Therapeutic Exercise x 60 mins:   Osteokinematic, arthrokinematic, and capsule mobilizatin and passive range of motion techniques were applied to the involved upper extremity. neuromuscular down regulation to the myofascial soft tissue structures of the Glenohumeral and Scapulothoracic region. Strengthening/Endurance/ADL function/Neuromuscular reeducation activities/exercises supervised and completed per flow sheet.    ROM      STRENGTHENING   NMR/PROPRIO   [x] pulleys sit/stand    [x] scapular rows: blue  [x] DAP eccentric scaption: 5#  [x] AAROM flexion with stick: 4#  [x] AROM IR/ER: org    [x] bodyblade  [x] AAROM elevation on wall   [x] AROM ant/post deltoid: grn [x] BOW push-ups  []  1/2 foam roll walk    [x] UBE:  Man. 3'/3' @ lv 1.0  [x] ABC's with BOW  []  IR with belt     [x] DAP bicep curls: 5 #   [] DAP tricep press:  #  [x] DAP FWD press: 5#        [x]  Scaption 1#        [x]  UE lat walk grn     Added/Changed Exercises:  [x]  Advanced as indicated in bold to address: [x] functional strength/ROM deficits [] balance/proprioceptive tasks  []  Modified: [] per subjective reports [] for patient time constraints [] for clinic time constraints      Modality:  []  E-Stim: type _ x _ min     []att   []unatt   []w/ice   []w/heat  []  Ultrasound: []cont   []pulse    _ W/cm2 x _  min   []1MHz   []3MHz  [x]  Ice pack: post      []  Hot pack: pre    []  Other: Neuromuscular Re-education minutes:  []  Kinesiotaping for   []  Neuromuscular reeducation to the VMO with use of [place] electrical stimulation in conjunction with active contraction and exercises. []  balance/proprioceptive exercises and activities in clinic as listed on flow sheet above. Patient Education: [] Review HEP    [x] Progressed/Changed HEP with orange theraband for strengthening  [] positioning   [] body mechanics   [] transfers   [] heat/ice application      ASSESSMENT  []  See progress note/recertification    Challenge by today's exercise progression to address functional weakness with reaching away from the body at/above shoulder height.     Progress towards goals / Updated goals:    PLAN  [x]  Upgrade activities as tolerated      [x]  Continue plan of care  []  Discharge due to:_  [] Other:_      Co-treatment by Art Hari, PTA 12/30/2021

## 2022-01-06 ENCOUNTER — OFFICE VISIT (OUTPATIENT)
Dept: ORTHOPEDIC SURGERY | Age: 72
End: 2022-01-06
Payer: COMMERCIAL

## 2022-01-06 DIAGNOSIS — Z98.890 STATUS POST ARTHROSCOPY OF SHOULDER: ICD-10-CM

## 2022-01-06 DIAGNOSIS — M25.512 LEFT SHOULDER PAIN, UNSPECIFIED CHRONICITY: ICD-10-CM

## 2022-01-06 DIAGNOSIS — M67.912 ROTATOR CUFF DYSFUNCTION, LEFT: Primary | ICD-10-CM

## 2022-01-06 PROCEDURE — 97110 THERAPEUTIC EXERCISES: CPT | Performed by: PHYSICAL THERAPIST

## 2022-01-06 NOTE — PROGRESS NOTES
PT DAILY TREATMENT NOTE    Patient Name: Luis Carlos Hobbs  Date:2022  : 1950  [x]  Patient  Verified  Payor: Criss Sivla / Plan: Jeri Hensley / Product Type: HMO /    Total Treatment Time (min): 60  Total Timed Codes (min): 60  Visit #:  30    1. Status post arthroscopy of shoulder  2. Left shoulder pain, unspecified chronicity    SUBJECTIVE  Subjective functional status/changes:   [] No changes reported    Patient reports continued fatigue/soreness with reaching out to her side or in front of her at/above shoulder height. .     OBJECTIVE    Therapeutic Exercise x 60 mins:   Osteokinematic, arthrokinematic, and capsule mobilizatin and passive range of motion techniques were applied to the involved upper extremity. neuromuscular down regulation to the myofascial soft tissue structures of the Glenohumeral and Scapulothoracic region. Strengthening/Endurance/ADL function/Neuromuscular reeducation activities/exercises supervised and completed per flow sheet.    ROM      STRENGTHENING   NMR/PROPRIO   [x] pulleys sit/stand    [x] scapular rows: blue  [x] DAP eccentric scaption:7.5#  [x] AAROM flexion with stick: 4#  [x] AROM IR/ER: org    [x] bodyblade  [x] AAROM elevation on wall   [x] AROM ant/post deltoid: grn [x] BOW push-ups  []  1/2 foam roll walk    [x] UBE:  Man. 3'/3' @ lv 1.0  [x] ABC's with BOW  []  IR with belt     [x] DAP bicep curls: 7.5 #   [] DAP tricep press:  #  [x] DAP FWD press: 7.5#        [x]  Scaption 1#        [x]  UE lat walk grn         [x]  ER @90 abd with pch  Added/Changed Exercises:  [x]  Advanced as indicated in bold to address: [x] functional strength/ROM deficits [] balance/proprioceptive tasks  []  Modified: [] per subjective reports [] for patient time constraints [] for clinic time constraints      Modality:  []  E-Stim: type _ x _ min     []att   []unatt   []w/ice   []w/heat  []  Ultrasound: []cont   []pulse    _ W/cm2 x _  min   []1MHz   []3MHz  [x]  Ice pack: post      [x]  Hot pack: pre    []  Other:     Neuromuscular Re-education minutes:  []  Kinesiotaping for   []  Neuromuscular reeducation to the VMO with use of [place] electrical stimulation in conjunction with active contraction and exercises. []  balance/proprioceptive exercises and activities in clinic as listed on flow sheet above. Patient Education: [] Review HEP    [x] Progressed/Changed HEP with orange theraband for strengthening  [] positioning   [] body mechanics   [] transfers   [] heat/ice application      ASSESSMENT  []  See progress note/recertification    Still with weakness with functional lift/reach with both forward flexion and abduction. Doing well with HEP and transition to 1x/wk to address these deficits with skilled interventions.     Progress towards goals / Updated goals:    PLAN  [x]  Upgrade activities as tolerated      [x]  Continue plan of care  []  Discharge due to:_  [] Other:_      Co-treatment by Lanie Carolina, PTA 1/6/2022

## 2022-01-06 NOTE — PROGRESS NOTES
I have reviewed the notes, assessments, and/or procedures performed by Bry Damon PTA, I concur with her/his documentation of Tam Sifuentes.

## 2022-01-12 NOTE — PROGRESS NOTES
PT DAILY TREATMENT NOTE    Patient Name: Marco Mcmahan  Date:2022  : 1950  [x]  Patient  Verified  Payor: Lis Mix / Plan: Deep Rebolledo / Product Type: HMO /    Total Treatment Time (min): 60  Total Timed Codes (min): 60    1. Status post arthroscopy of shoulder  2. Left shoulder pain, unspecified chronicity    SUBJECTIVE  Subjective functional status/changes:   [] No changes reported    Patient reports she is having less pain with functional abduction/elevation reaching. OBJECTIVE    Therapeutic Exercise x 60 mins:   Osteokinematic, arthrokinematic, and capsule mobilizatin and passive range of motion techniques were applied to the involved upper extremity. neuromuscular down regulation to the myofascial soft tissue structures of the Glenohumeral and Scapulothoracic region. Strengthening/Endurance/ADL function/Neuromuscular reeducation activities/exercises supervised and completed as listed below  .    ROM      STRENGTHENING   NMR/PROPRIO   [x] pulleys sit/stand    [x] scapular rows: blue  [x] DAP eccentric scaption:7.5#  [x] AAROM flexion with stick: 4#  [x] AROM IR/ER: org    [x] bodyblade  [x] AAROM elevation on wall   [x] AROM ant/post deltoid: grn [x] BOW push-ups  []  1/2 foam roll walk    [x] UBE:  Man. 3'/3' @ lv 1.0  [x] ABC's with BOW  [x]  IR with belt    [x] DAP bicep curls: 7.5 #   [] DAP tricep press:  #  [x] DAP FWD press: 7.5#        [x]  Scaption 1#        [x]  UE lat walk grn         [x]  ER @90 abd with pch  Added/Changed Exercises:  [x]  Advanced as indicated in bold to address: [x] functional strength/ROM deficits [] balance/proprioceptive tasks  []  Modified: [] per subjective reports [] for patient time constraints [] for clinic time constraints      Modality:  []  E-Stim: type _ x _ min     []att   []unatt   []w/ice   []w/heat  []  Ultrasound: []cont   []pulse    _ W/cm2 x _  min   []1MHz   []3MHz  [x]  Ice pack: post      [x]  Hot pack: pre    []  Other: Neuromuscular Re-education minutes:  []  Kinesiotaping for   []  Neuromuscular reeducation to the VMO with use of [place] electrical stimulation in conjunction with active contraction and exercises. []  balance/proprioceptive exercises and activities in clinic as listed on flow sheet above. Patient Education: [] Review HEP    [x] Progressed/Changed HEP with orange theraband for strengthening  [] positioning   [] body mechanics   [] transfers   [] heat/ice application      ASSESSMENT  []  See progress note/recertification    Strength at/above shoulder height improving with exercise advances here in clinic. She is progressing well towards goals for D/C at end of month.     Progress towards goals / Updated goals:    PLAN  [x]  Upgrade activities as tolerated      [x]  Continue plan of care  []  Discharge due to:_  [] Other:_      Co-treatment by Addis Vigil, JOSE DANIEL 1/13/2022

## 2022-01-13 ENCOUNTER — OFFICE VISIT (OUTPATIENT)
Dept: ORTHOPEDIC SURGERY | Age: 72
End: 2022-01-13
Payer: COMMERCIAL

## 2022-01-13 DIAGNOSIS — M67.912 ROTATOR CUFF DYSFUNCTION, LEFT: Primary | ICD-10-CM

## 2022-01-13 DIAGNOSIS — Z98.890 STATUS POST ARTHROSCOPY OF SHOULDER: ICD-10-CM

## 2022-01-13 PROCEDURE — 97110 THERAPEUTIC EXERCISES: CPT | Performed by: PHYSICAL THERAPIST

## 2022-01-13 NOTE — PROGRESS NOTES
I have reviewed the notes, assessments, and/or procedures performed by [facility] JOSE DANIEL, I concur with her/his documentation of Maribel Gaming.

## 2022-01-19 NOTE — PROGRESS NOTES
PT DAILY TREATMENT NOTE    Patient Name: Ana Owen  Date:2022  : 1950  [x]  Patient  Verified  Payor: Elvia Ocampo / Plan: Adamaris Lambert / Product Type: HMO /    Total Treatment Time (min): 60  Total Timed Codes (min): 60    1. Status post arthroscopy of shoulder  2. Left shoulder pain, unspecified chronicity    SUBJECTIVE  Subjective functional status/changes:   [] No changes reported    Patient reports she has had very little pain in the shoulder with her daily reaching/lifting tasks. OBJECTIVE    Therapeutic Exercise x 60 mins:   Osteokinematic, arthrokinematic, and capsule mobilizatin and passive range of motion techniques were applied to the involved upper extremity. neuromuscular down regulation to the myofascial soft tissue structures of the Glenohumeral and Scapulothoracic region. Strengthening/Endurance/ADL function/Neuromuscular reeducation activities/exercises supervised and completed as listed below  .    ROM      STRENGTHENING   NMR/PROPRIO   [x] pulleys sit/stand    [x] scapular rows: blue  [x] DAP eccentric scaption:7.5#  [x] AAROM flexion with stick: 4#  [x] AROM IR/ER: org    [x] bodyblade  [x] AAROM elevation on wall   [x] AROM ant/post deltoid: grn [x] BOW push-ups  []  1/2 foam roll walk    [x] UBE:  Man. 3'/3' @ lv 1.0  [x] ABC's with BOW  [x]  IR with belt     [x] DAP bicep curls: 7.5 #   [] DAP tricep press:  #  [x] DAP FWD press: 7.5#        [x]  Scaption 1#        [x]  UE lat walk grn         [x]  ER @90 abd with pch  Added/Changed Exercises:  [x]  Advanced as indicated in bold to address: [x] functional strength/ROM deficits [] balance/proprioceptive tasks  []  Modified: [] per subjective reports [] for patient time constraints [] for clinic time constraints      Modality:  []  E-Stim: type _ x _ min     []att   []unatt   []w/ice   []w/heat  []  Ultrasound: []cont   []pulse    _ W/cm2 x _  min   []1MHz   []3MHz  [x]  Ice pack: post      [x]  Hot pack: pre []  Other:     Neuromuscular Re-education minutes:  []  Kinesiotaping for   []  Neuromuscular reeducation to the VMO with use of [place] electrical stimulation in conjunction with active contraction and exercises. []  balance/proprioceptive exercises and activities in clinic as listed on flow sheet above. Patient Education: [] Review HEP    [x] Progressed/Changed HEP with orange theraband for strengthening  [] positioning   [] body mechanics   [] transfers   [] heat/ice application      ASSESSMENT  []  See progress note/recertification    Pain decreasing with reaching/lifting ADLs and she has full ROM. Continue x1 visit and D/C at that time.     Progress towards goals / Updated goals:    PLAN  [x]  Upgrade activities as tolerated      [x]  Continue plan of care  []  Discharge due to:_  [] Other:_      Co-treatment by Kell Dumont PTA 1/20/2022

## 2022-01-20 ENCOUNTER — OFFICE VISIT (OUTPATIENT)
Dept: ORTHOPEDIC SURGERY | Age: 72
End: 2022-01-20
Payer: COMMERCIAL

## 2022-01-20 DIAGNOSIS — Z98.890 STATUS POST ARTHROSCOPY OF SHOULDER: Primary | ICD-10-CM

## 2022-01-20 DIAGNOSIS — M25.512 LEFT SHOULDER PAIN, UNSPECIFIED CHRONICITY: ICD-10-CM

## 2022-01-20 DIAGNOSIS — M67.912 ROTATOR CUFF DYSFUNCTION, LEFT: ICD-10-CM

## 2022-01-20 PROCEDURE — 97110 THERAPEUTIC EXERCISES: CPT | Performed by: PHYSICAL THERAPIST

## 2022-01-20 NOTE — PROGRESS NOTES
I have reviewed the notes, assessments, and/or procedures performed by Kell Dumont PTA, I concur with her/his documentation of Mal Iza.

## 2022-01-27 ENCOUNTER — OFFICE VISIT (OUTPATIENT)
Dept: ORTHOPEDIC SURGERY | Age: 72
End: 2022-01-27
Payer: COMMERCIAL

## 2022-01-27 DIAGNOSIS — M67.912 ROTATOR CUFF DYSFUNCTION, LEFT: ICD-10-CM

## 2022-01-27 DIAGNOSIS — M25.512 LEFT SHOULDER PAIN, UNSPECIFIED CHRONICITY: ICD-10-CM

## 2022-01-27 DIAGNOSIS — Z98.890 STATUS POST ARTHROSCOPY OF SHOULDER: Primary | ICD-10-CM

## 2022-01-27 PROCEDURE — 97110 THERAPEUTIC EXERCISES: CPT | Performed by: PHYSICAL THERAPIST

## 2022-01-27 NOTE — PROGRESS NOTES
PT DAILY TREATMENT NOTE    Patient Name: Aviva Becerra  Date:2022  : 1950  [x]  Patient  Verified  Payor: Shu Trinidad / Plan: Dony Molina / Product Type: HMO /    Total Treatment Time (min): 60  Total Timed Codes (min): 60    1. Status post arthroscopy of shoulder  2. Left shoulder pain, unspecified chronicity    SUBJECTIVE  Subjective functional status/changes:   [] No changes reported    Patient reports her shoulder feels \"normal\" but she is still being careful when lifting heavy objects. OBJECTIVE    Therapeutic Exercise x 60 mins:   Osteokinematic, arthrokinematic, and capsule mobilizatin and passive range of motion techniques were applied to the involved upper extremity. neuromuscular down regulation to the myofascial soft tissue structures of the Glenohumeral and Scapulothoracic region. AROM Elevation 160, ER 95/T2, IR 90/T12    Strengthening/Endurance/ADL function/Neuromuscular reeducation activities/exercises supervised and completed as listed below.     ROM      STRENGTHENING   NMR/PROPRIO   [x] pulleys sit/stand    [x] scapular rows: blue  [x] DAP eccentric scaption:7.5#  [x] AAROM flexion with stick: 4#  [x] AROM IR/ER: org    [x] bodyblade  [x] AAROM elevation on wall   [x] AROM ant/post deltoid: grn [x] BOW push-ups  []  1/2 foam roll walk    [x] UBE:  Man. 3'/3' @ lv 1.0  [x] ABC's with BOW  [x]  IR with belt     [x] DAP bicep curls: 7.5 #   [] DAP tricep press:  #  [x] DAP FWD press: 7.5#        [x]  Scaption 2#        [x]  UE lat walk grn         [x]  ER @90 abd with pch  Added/Changed Exercises:  [x]  Advanced as indicated in bold to address: [x] functional strength/ROM deficits [] balance/proprioceptive tasks  []  Modified: [] per subjective reports [] for patient time constraints [] for clinic time constraints      Modality:  []  E-Stim: type _ x _ min     []att   []unatt   []w/ice   []w/heat  []  Ultrasound: []cont   []pulse    _ W/cm2 x _  min   []1MHz []3MHz  [x]  Ice pack: post      [x]  Hot pack: pre    []  Other:     Neuromuscular Re-education minutes:  []  Kinesiotaping for   []  Neuromuscular reeducation to the VMO with use of [place] electrical stimulation in conjunction with active contraction and exercises. []  balance/proprioceptive exercises and activities in clinic as listed on flow sheet above. Patient Education: [] Review HEP    [x] Progressed/Changed HEP with orange theraband for strengthening  [] positioning   [] body mechanics   [] transfers   [] heat/ice application      ASSESSMENT  []  See progress note/recertification    She has full ROM in the shoulder with pain levels consistently being </= 1/10 with all ADLs and recreational activities. She is I with her exercises at home and in the gym. She is in agreement with plan to D/C at this time.     Progress towards goals / Updated goals:    PLAN  []  Upgrade activities as tolerated      []  Continue plan of care  [x]  Discharge due to: having met functional goals  [] Other:_      Co-treatment by Lenny Crooks PTA 1/27/2022

## 2022-01-27 NOTE — PROGRESS NOTES
I have reviewed the notes, assessments, and/or procedures performed by Delta Walker PTA, I concur with her/his documentation of Jovon Lu.

## 2022-03-18 PROBLEM — M75.102 TEAR OF LEFT ROTATOR CUFF: Status: ACTIVE | Noted: 2021-10-20

## 2022-03-18 PROBLEM — Z98.890 STATUS POST ARTHROSCOPY OF SHOULDER: Status: ACTIVE | Noted: 2021-11-10

## 2022-03-18 PROBLEM — S43.005A DISLOCATION OF LEFT SHOULDER JOINT: Status: ACTIVE | Noted: 2021-10-20

## 2023-01-16 ENCOUNTER — HOSPITAL ENCOUNTER (OUTPATIENT)
Dept: GENERAL RADIOLOGY | Age: 73
Discharge: HOME | End: 2023-01-16
Payer: COMMERCIAL

## 2023-01-16 ENCOUNTER — TRANSCRIBE ORDER (OUTPATIENT)
Dept: REGISTRATION | Age: 73
End: 2023-01-16

## 2023-01-16 DIAGNOSIS — R94.30 NONSPECIFIC ABNORMAL FUNCTION STUDY, CARDIOVASCULAR: Primary | ICD-10-CM

## 2023-01-16 DIAGNOSIS — I35.0 NODULAR CALCIFIC AORTIC VALVE STENOSIS: ICD-10-CM

## 2023-01-16 DIAGNOSIS — Z95.2 HEART VALVE REPLACED BY TRANSPLANT: ICD-10-CM

## 2023-01-16 DIAGNOSIS — R94.30 NONSPECIFIC ABNORMAL FUNCTION STUDY, CARDIOVASCULAR: ICD-10-CM

## 2023-01-16 PROCEDURE — 71046 X-RAY EXAM CHEST 2 VIEWS: CPT

## 2023-01-23 ENCOUNTER — HOSPITAL ENCOUNTER (OUTPATIENT)
Age: 73
Setting detail: OUTPATIENT SURGERY
Discharge: HOME | End: 2023-01-23
Attending: INTERNAL MEDICINE | Admitting: INTERNAL MEDICINE
Payer: COMMERCIAL

## 2023-01-23 VITALS
HEIGHT: 60 IN | HEART RATE: 85 BPM | RESPIRATION RATE: 16 BRPM | TEMPERATURE: 98.1 F | BODY MASS INDEX: 33.38 KG/M2 | DIASTOLIC BLOOD PRESSURE: 68 MMHG | OXYGEN SATURATION: 94 % | WEIGHT: 170 LBS | SYSTOLIC BLOOD PRESSURE: 131 MMHG

## 2023-01-23 DIAGNOSIS — R94.30 ABNORMAL RESULTS OF CARDIOVASCULAR FUNCTION STUDIES: ICD-10-CM

## 2023-01-23 PROCEDURE — 77030008543 HC TBNG MON PRSS MRTM -A: Performed by: INTERNAL MEDICINE

## 2023-01-23 PROCEDURE — C1769 GUIDE WIRE: HCPCS | Performed by: INTERNAL MEDICINE

## 2023-01-23 PROCEDURE — 76937 US GUIDE VASCULAR ACCESS: CPT | Performed by: INTERNAL MEDICINE

## 2023-01-23 PROCEDURE — 93454 CORONARY ARTERY ANGIO S&I: CPT | Performed by: INTERNAL MEDICINE

## 2023-01-23 PROCEDURE — 77030015766: Performed by: INTERNAL MEDICINE

## 2023-01-23 PROCEDURE — 77030019698 HC SYR ANGI MDLON MRTM -A: Performed by: INTERNAL MEDICINE

## 2023-01-23 PROCEDURE — 74011000250 HC RX REV CODE- 250: Performed by: INTERNAL MEDICINE

## 2023-01-23 PROCEDURE — 99153 MOD SED SAME PHYS/QHP EA: CPT | Performed by: INTERNAL MEDICINE

## 2023-01-23 PROCEDURE — C1894 INTRO/SHEATH, NON-LASER: HCPCS | Performed by: INTERNAL MEDICINE

## 2023-01-23 PROCEDURE — 77030019569 HC BND COMPR RAD TERU -B: Performed by: INTERNAL MEDICINE

## 2023-01-23 PROCEDURE — 74011000636 HC RX REV CODE- 636: Performed by: INTERNAL MEDICINE

## 2023-01-23 PROCEDURE — 74011250636 HC RX REV CODE- 250/636: Performed by: INTERNAL MEDICINE

## 2023-01-23 PROCEDURE — 99152 MOD SED SAME PHYS/QHP 5/>YRS: CPT | Performed by: INTERNAL MEDICINE

## 2023-01-23 PROCEDURE — 77030040934 HC CATH DIAG DXTERITY MEDT -A: Performed by: INTERNAL MEDICINE

## 2023-01-23 PROCEDURE — 2709999900 HC NON-CHARGEABLE SUPPLY: Performed by: INTERNAL MEDICINE

## 2023-01-23 PROCEDURE — 36200 PLACE CATHETER IN AORTA: CPT | Performed by: INTERNAL MEDICINE

## 2023-01-23 PROCEDURE — 77030010221 HC SPLNT WR POS TELE -B: Performed by: INTERNAL MEDICINE

## 2023-01-23 RX ORDER — ACETAMINOPHEN 325 MG/1
650 TABLET ORAL
Status: CANCELLED | OUTPATIENT
Start: 2023-01-23

## 2023-01-23 RX ORDER — VERAPAMIL HYDROCHLORIDE 2.5 MG/ML
INJECTION, SOLUTION INTRAVENOUS AS NEEDED
Status: DISCONTINUED | OUTPATIENT
Start: 2023-01-23 | End: 2023-01-23 | Stop reason: HOSPADM

## 2023-01-23 RX ORDER — SODIUM CHLORIDE 9 MG/ML
100 INJECTION, SOLUTION INTRAVENOUS CONTINUOUS
Status: CANCELLED | OUTPATIENT
Start: 2023-01-23

## 2023-01-23 RX ORDER — HEPARIN SODIUM 200 [USP'U]/100ML
INJECTION, SOLUTION INTRAVENOUS
Status: COMPLETED | OUTPATIENT
Start: 2023-01-23 | End: 2023-01-23

## 2023-01-23 RX ORDER — SODIUM CHLORIDE 0.9 % (FLUSH) 0.9 %
5-40 SYRINGE (ML) INJECTION AS NEEDED
Status: CANCELLED | OUTPATIENT
Start: 2023-01-23

## 2023-01-23 RX ORDER — MIDAZOLAM HYDROCHLORIDE 1 MG/ML
INJECTION, SOLUTION INTRAMUSCULAR; INTRAVENOUS AS NEEDED
Status: DISCONTINUED | OUTPATIENT
Start: 2023-01-23 | End: 2023-01-23 | Stop reason: HOSPADM

## 2023-01-23 RX ORDER — SODIUM CHLORIDE 0.9 % (FLUSH) 0.9 %
5-40 SYRINGE (ML) INJECTION EVERY 8 HOURS
Status: CANCELLED | OUTPATIENT
Start: 2023-01-23

## 2023-01-23 RX ORDER — ONDANSETRON 2 MG/ML
4 INJECTION INTRAMUSCULAR; INTRAVENOUS
Status: CANCELLED | OUTPATIENT
Start: 2023-01-23

## 2023-01-23 RX ORDER — FENTANYL CITRATE 50 UG/ML
INJECTION, SOLUTION INTRAMUSCULAR; INTRAVENOUS AS NEEDED
Status: DISCONTINUED | OUTPATIENT
Start: 2023-01-23 | End: 2023-01-23 | Stop reason: HOSPADM

## 2023-01-23 RX ORDER — HEPARIN SODIUM 1000 [USP'U]/ML
INJECTION, SOLUTION INTRAVENOUS; SUBCUTANEOUS AS NEEDED
Status: DISCONTINUED | OUTPATIENT
Start: 2023-01-23 | End: 2023-01-23 | Stop reason: HOSPADM

## 2023-01-23 RX ORDER — NALOXONE HYDROCHLORIDE 0.4 MG/ML
0.4 INJECTION, SOLUTION INTRAMUSCULAR; INTRAVENOUS; SUBCUTANEOUS AS NEEDED
Status: CANCELLED | OUTPATIENT
Start: 2023-01-23

## 2023-01-23 RX ORDER — LIDOCAINE HYDROCHLORIDE 10 MG/ML
INJECTION, SOLUTION EPIDURAL; INFILTRATION; INTRACAUDAL; PERINEURAL AS NEEDED
Status: DISCONTINUED | OUTPATIENT
Start: 2023-01-23 | End: 2023-01-23 | Stop reason: HOSPADM

## 2023-01-23 NOTE — CARDIO/PULMONARY
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 .. Continue evaluation for surgical AVR and root replacement\"    Smoking history assessed. Patient is a former smoker.    Smoking Cessation Program link has not been added to the AVS.

## 2023-01-23 NOTE — PROGRESS NOTES
TRANSFER - IN REPORT:    Verbal report received from Charlie s, [facility] on [facility]  being received from cath lab for routine progression of care. Report consisted of patients Situation, Background, Assessment and Recommendations(SBAR). Information from the following report(s) SBAR, Procedure Summary, and Recent Results was reviewed with the receiving clinician. Opportunity for questions and clarification was provided. Assessment completed upon patients arrival to [address] [place] [address] and care assumed. Cardiac Cath Lab Recovery Arrival Note:    [facility] arrived to [facility] recovery area. Patient procedure= left heart cath. Patient on cardiac monitor, non-invasive blood pressure, SPO2 monitor. On room air. IV  of normal saline on pump at 50 ml/hr. Patient status doing well without problems. Patient is A&Ox 4. Patient reports no complaints. PROCEDURE SITE CHECK:    Procedure site:without any bleeding and no hematoma, no pain/discomfort reported at procedure site. No change in patient status. Continue to monitor patient and status.

## 2023-01-23 NOTE — PROGRESS NOTES
Cardiac Cath Lab Recovery Arrival Note:      Matt Vallejo arrived to Cardiac Cath Lab, Recovery Area. Staff introduced to patient. Patient identifiers verified with NAME and DATE OF BIRTH. Procedure verified with patient. Consent forms reviewed and signed by patient or authorized representative and verified. Allergies verified. Patient and family oriented to department. Patient and family informed of procedure and plan of care. Questions answered with review. Patient prepped for procedure, per orders from physician, prior to arrival.    Patient on cardiac monitor, non-invasive blood pressure, SPO2 monitor. On room air. Patient is A&Ox 4. Patient reports no complaints. Patient in stretcher, in low position, with side rails up, call bell within reach, patient instructed to call if assistance as needed. Patient prep in: [address], [place]. Patient family has pager # [phone]  Family in:  monica, Daughter Vivien Evans.    Prep by: Narcisa Uriostegui

## 2023-01-23 NOTE — DISCHARGE INSTRUCTIONS
[address] [facility], [address]   [phone]  [address]    [email or web address]    Patient Discharge Instructions    Becky Benz / [id number] : 1950    Admitted 2023 Discharged: 2023       It is important that you take the medication exactly as they are prescribed. Keep your medication in the bottles provided by the pharmacist and keep a list of the medication names, dosages, and times to be taken in your wallet. Do not take other medications without consulting your doctor. BRING ALL OF YOUR MEDICINES TO YOUR OFFICE VISIT. Follow-up with Dr. Kamran Felix to schedule surgery. Cardiac Catheterization  Discharge Instructions    Do not drive, operate any machinery, or sign any legal documents for 24 hours after your procedure. You must have someone to drive you home. You may take a shower 24 hours after your cardiac catheterization. Be sure to get the dressing wet and then remove it; gently wash the area with warm soapy water. Pat dry and leave open to air. To help prevent infections, be sure to keep the cath site clean and dry. No lotions, creams, powders, ointments, etc. in the cath site for approximately 1 week. Do not take a tub bath, get in a hot tub or swimming pool for approximately 5 days or until the cath site is completely healed. No strenuous activity or heavy lifting over 10 lbs. for 7 days. Drink plenty of fluids for 24-48 hours after your cath to flush the contrast dye from your kidneys. No alcoholic beverages for 24 hours. You may resume your previous diet (low fat, low cholesterol) after your cath. After your cath, some bruising or discomfort is common during the healing process. Tylenol, 1-2 tablets every 6 hours as needed, is recommended if you experience any discomfort.   If you experience any signs or symptoms of infection such as fever, chills, or poorly healing incision, persistent tenderness or swelling in the groin, redness and/or warmth to the touch, numbness, significant tingling or pain at the groin site or affected extremity, rash, drainage from the cath site, or if the leg feels tight or swollen, call your physician right away. If bleeding at the cath site occurs, take a clean gauze pad and apply direct pressure to the groin just above the puncture site. Call 911 immediately, and continue to apply direct pressure until an ambulance gets to your location. You may return to work  2  days after your cardiac cath if no groin bleeding. Information obtained by :  I understand that if any problems occur once I am at home I am to contact my physician. I understand and acknowledge receipt of the instructions indicated above. R.N.'s Signature                                                                  Date/Time                                                                                                                                              Patient or Representative Signature                                                          Date/Time      Hannah Wiley MD             [address] [place] [address], [address]    [phone]  [address]    [email or web address]

## 2023-01-23 NOTE — Clinical Note
Aspirin Registry Question:   Aspirin was given prior to the procedure.
Contrast: Isovue. Contrast concentration: 370. Amount: 80 mL.
Diagnostic catheter inserted over exchange length wire.
Diagnostic catheter inserted over the wire.
Diagnostic catheter removed over exchange length wire.
Diagnostic catheter removed over exchange length wire.
History and physical documented and up to date, allergies reviewed, lab results reviewed, pre-procedure education provided, patient verbalized understanding of procedure, procedural consent signed and patient is NPO.
ID band present and verified. Family is in the waiting area.
Mallampati: Class III - soft palate, base of uvula visible. ASA: Class 3 - patient with severe systemic disease.
Multiple views of the left main, left coronary artery, LAD and circumflex artery obtained using hand injection.
Multiple views of the right coronary artery obtained using hand injection.
No specimen collected. Estimated Blood Loss: <30 mL.
Physician has arrived.
Right groin and right radial clipped prepped with ChloraPrep and draped.
Right radial artery. Accessed successfully. Micropuncture needle used. Using fluoro and ultrasound guidance.  Number of attempts =  1.
Sheath #1: Closed using R-Band. Radial band pressure set at: 11.
Sheath #1: Presents as clean, dry, & intact, no bleeding and no hematoma.
Sheath #1: Sheath: inserted. Sheath inserted/placed in the right radial  artery.
Sheath #1: Sheath: removed. Hemostasis achieved.
Single view of the aortic root obtained using power injection. Total volume = 30 mL. Rate = 15 mL/sec. Pressure = 900 PSI. Rate of rise = 0 sec.
TRANSFER - OUT REPORT:     Verbal report given to: (at bedside). Report consisted of patient's Situation, Background, Assessment and   Recommendations(SBAR). Opportunity for questions and clarification was provided. Patient transported with a Registered Nurse. Patient transported to: recovery.
The physician has been notified.
No

## 2023-01-23 NOTE — PROGRESS NOTES
Primary Nurse Vidya Nguyen RN and Jose Logan RN performed a dual skin assessment on this patient No impairment noted  Hi score is 22  Meplex applied to sacrum

## 2023-01-31 ENCOUNTER — APPOINTMENT (OUTPATIENT)
Dept: GENERAL RADIOLOGY | Age: 73
DRG: 069 | End: 2023-01-31
Attending: STUDENT IN AN ORGANIZED HEALTH CARE EDUCATION/TRAINING PROGRAM
Payer: COMMERCIAL

## 2023-01-31 ENCOUNTER — OFFICE VISIT (OUTPATIENT)
Dept: CARDIOLOGY CLINIC | Age: 73
End: 2023-01-31
Payer: COMMERCIAL

## 2023-01-31 ENCOUNTER — APPOINTMENT (OUTPATIENT)
Dept: CT IMAGING | Age: 73
DRG: 069 | End: 2023-01-31
Attending: STUDENT IN AN ORGANIZED HEALTH CARE EDUCATION/TRAINING PROGRAM
Payer: COMMERCIAL

## 2023-01-31 ENCOUNTER — HOSPITAL ENCOUNTER (OUTPATIENT)
Age: 73
Setting detail: OBSERVATION
Discharge: HOME | DRG: 069 | End: 2023-02-01
Attending: STUDENT IN AN ORGANIZED HEALTH CARE EDUCATION/TRAINING PROGRAM | Admitting: HOSPITALIST
Payer: COMMERCIAL

## 2023-01-31 VITALS
SYSTOLIC BLOOD PRESSURE: 135 MMHG | RESPIRATION RATE: 14 BRPM | TEMPERATURE: 97.7 F | OXYGEN SATURATION: 97 % | BODY MASS INDEX: 32.98 KG/M2 | HEART RATE: 87 BPM | DIASTOLIC BLOOD PRESSURE: 89 MMHG | HEIGHT: 60 IN | WEIGHT: 168 LBS

## 2023-01-31 DIAGNOSIS — I35.0 AORTIC VALVE STENOSIS, ETIOLOGY OF CARDIAC VALVE DISEASE UNSPECIFIED: Primary | ICD-10-CM

## 2023-01-31 DIAGNOSIS — I35.0 NONRHEUMATIC AORTIC VALVE STENOSIS: Primary | ICD-10-CM

## 2023-01-31 DIAGNOSIS — G45.9 TIA (TRANSIENT ISCHEMIC ATTACK): Primary | ICD-10-CM

## 2023-01-31 LAB
ALBUMIN SERPL-MCNC: 3.6 G/DL (ref 3.5–5)
ALBUMIN/GLOB SERPL: 1.1 (ref 1.1–2.2)
ALP SERPL-CCNC: 81 U/L (ref 45–117)
ALT SERPL-CCNC: 23 U/L (ref 12–78)
ANION GAP SERPL CALC-SCNC: 3 MMOL/L (ref 5–15)
AST SERPL-CCNC: 14 U/L (ref 15–37)
BASOPHILS # BLD: 0 K/UL (ref 0–0.1)
BASOPHILS NFR BLD: 1 % (ref 0–1)
BILIRUB SERPL-MCNC: 0.4 MG/DL (ref 0.2–1)
BUN SERPL-MCNC: 10 MG/DL (ref 6–20)
BUN/CREAT SERPL: 21 (ref 12–20)
CALCIUM SERPL-MCNC: 9.2 MG/DL (ref 8.5–10.1)
CHLORIDE SERPL-SCNC: 103 MMOL/L (ref 97–108)
CO2 SERPL-SCNC: 28 MMOL/L (ref 21–32)
COMMENT, HOLDF: NORMAL
COMMENT, HOLDF: NORMAL
CREAT SERPL-MCNC: 0.48 MG/DL (ref 0.55–1.02)
DIFFERENTIAL METHOD BLD: ABNORMAL
EOSINOPHIL # BLD: 0.1 K/UL (ref 0–0.4)
EOSINOPHIL NFR BLD: 1 % (ref 0–7)
ERYTHROCYTE [DISTWIDTH] IN BLOOD BY AUTOMATED COUNT: 13.5 % (ref 11.5–14.5)
GLOBULIN SER CALC-MCNC: 3.4 G/DL (ref 2–4)
GLUCOSE BLD STRIP.AUTO-MCNC: 88 MG/DL (ref 65–117)
GLUCOSE SERPL-MCNC: 86 MG/DL (ref 65–100)
HCT VFR BLD AUTO: 44 % (ref 35–47)
HGB BLD-MCNC: 13.7 G/DL (ref 11.5–16)
IMM GRANULOCYTES # BLD AUTO: 0 K/UL (ref 0–0.04)
IMM GRANULOCYTES NFR BLD AUTO: 1 % (ref 0–0.5)
INR PPP: 1 (ref 0.9–1.1)
LYMPHOCYTES # BLD: 2 K/UL (ref 0.8–3.5)
LYMPHOCYTES NFR BLD: 33 % (ref 12–49)
MCH RBC QN AUTO: 28.2 PG (ref 26–34)
MCHC RBC AUTO-ENTMCNC: 31.1 G/DL (ref 30–36.5)
MCV RBC AUTO: 90.7 FL (ref 80–99)
MONOCYTES # BLD: 0.5 K/UL (ref 0–1)
MONOCYTES NFR BLD: 8 % (ref 5–13)
NEUTS SEG # BLD: 3.4 K/UL (ref 1.8–8)
NEUTS SEG NFR BLD: 56 % (ref 32–75)
NRBC # BLD: 0 K/UL (ref 0–0.01)
NRBC BLD-RTO: 0 PER 100 WBC
PLATELET # BLD AUTO: 147 K/UL (ref 150–400)
PMV BLD AUTO: 11.6 FL (ref 8.9–12.9)
POTASSIUM SERPL-SCNC: 3.8 MMOL/L (ref 3.5–5.1)
PROT SERPL-MCNC: 7 G/DL (ref 6.4–8.2)
PROTHROMBIN TIME: 10.7 SEC (ref 9–11.1)
RBC # BLD AUTO: 4.85 M/UL (ref 3.8–5.2)
SAMPLES BEING HELD,HOLD: NORMAL
SAMPLES BEING HELD,HOLD: NORMAL
SERVICE CMNT-IMP: NORMAL
SODIUM SERPL-SCNC: 134 MMOL/L (ref 136–145)
TROPONIN-HIGH SENSITIVITY: 54 NG/L (ref 0–51)
WBC # BLD AUTO: 5.9 K/UL (ref 3.6–11)

## 2023-01-31 PROCEDURE — 82962 GLUCOSE BLOOD TEST: CPT

## 2023-01-31 PROCEDURE — 36415 COLL VENOUS BLD VENIPUNCTURE: CPT

## 2023-01-31 PROCEDURE — 99285 EMERGENCY DEPT VISIT HI MDM: CPT

## 2023-01-31 PROCEDURE — 65270000046 HC RM TELEMETRY

## 2023-01-31 PROCEDURE — 74011250637 HC RX REV CODE- 250/637: Performed by: STUDENT IN AN ORGANIZED HEALTH CARE EDUCATION/TRAINING PROGRAM

## 2023-01-31 PROCEDURE — 99222 1ST HOSP IP/OBS MODERATE 55: CPT | Performed by: PSYCHIATRY & NEUROLOGY

## 2023-01-31 PROCEDURE — G0378 HOSPITAL OBSERVATION PER HR: HCPCS

## 2023-01-31 PROCEDURE — 85610 PROTHROMBIN TIME: CPT

## 2023-01-31 PROCEDURE — 1123F ACP DISCUSS/DSCN MKR DOCD: CPT | Performed by: NURSE PRACTITIONER

## 2023-01-31 PROCEDURE — 80053 COMPREHEN METABOLIC PANEL: CPT

## 2023-01-31 PROCEDURE — 70496 CT ANGIOGRAPHY HEAD: CPT

## 2023-01-31 PROCEDURE — 74011000636 HC RX REV CODE- 636: Performed by: RADIOLOGY

## 2023-01-31 PROCEDURE — 94762 N-INVAS EAR/PLS OXIMTRY CONT: CPT

## 2023-01-31 PROCEDURE — 71045 X-RAY EXAM CHEST 1 VIEW: CPT

## 2023-01-31 PROCEDURE — 84484 ASSAY OF TROPONIN QUANT: CPT

## 2023-01-31 PROCEDURE — 85025 COMPLETE CBC W/AUTO DIFF WBC: CPT

## 2023-01-31 PROCEDURE — 99204 OFFICE O/P NEW MOD 45 MIN: CPT | Performed by: NURSE PRACTITIONER

## 2023-01-31 PROCEDURE — 70450 CT HEAD/BRAIN W/O DYE: CPT

## 2023-01-31 PROCEDURE — 93005 ELECTROCARDIOGRAM TRACING: CPT

## 2023-01-31 PROCEDURE — 0042T CT CODE NEURO PERF W CBF: CPT

## 2023-01-31 RX ORDER — ATORVASTATIN CALCIUM 40 MG/1
40 TABLET, FILM COATED ORAL DAILY
Status: DISCONTINUED | OUTPATIENT
Start: 2023-02-01 | End: 2023-02-01 | Stop reason: HOSPADM

## 2023-01-31 RX ORDER — PANTOPRAZOLE SODIUM 40 MG/1
40 TABLET, DELAYED RELEASE ORAL
Status: DISCONTINUED | OUTPATIENT
Start: 2023-02-01 | End: 2023-02-01 | Stop reason: HOSPADM

## 2023-01-31 RX ORDER — GUAIFENESIN 100 MG/5ML
81 LIQUID (ML) ORAL DAILY
Status: DISCONTINUED | OUTPATIENT
Start: 2023-02-01 | End: 2023-02-01 | Stop reason: HOSPADM

## 2023-01-31 RX ORDER — MONTELUKAST SODIUM 10 MG/1
10 TABLET ORAL DAILY
Status: DISCONTINUED | OUTPATIENT
Start: 2023-02-01 | End: 2023-02-01 | Stop reason: HOSPADM

## 2023-01-31 RX ORDER — LOSARTAN POTASSIUM 50 MG/1
50 TABLET ORAL DAILY
Status: DISCONTINUED | OUTPATIENT
Start: 2023-02-01 | End: 2023-02-01 | Stop reason: HOSPADM

## 2023-01-31 RX ORDER — ACETAMINOPHEN 650 MG/1
650 SUPPOSITORY RECTAL
Status: DISCONTINUED | OUTPATIENT
Start: 2023-01-31 | End: 2023-02-01 | Stop reason: HOSPADM

## 2023-01-31 RX ORDER — GUAIFENESIN 100 MG/5ML
243 LIQUID (ML) ORAL
Status: COMPLETED | OUTPATIENT
Start: 2023-01-31 | End: 2023-01-31

## 2023-01-31 RX ORDER — OXYBUTYNIN CHLORIDE 5 MG/1
10 TABLET, EXTENDED RELEASE ORAL DAILY
Status: DISCONTINUED | OUTPATIENT
Start: 2023-02-01 | End: 2023-02-01 | Stop reason: HOSPADM

## 2023-01-31 RX ORDER — ACETAMINOPHEN 325 MG/1
650 TABLET ORAL
Status: DISCONTINUED | OUTPATIENT
Start: 2023-01-31 | End: 2023-02-01 | Stop reason: HOSPADM

## 2023-01-31 RX ORDER — CLOPIDOGREL 300 MG/1
300 TABLET, FILM COATED ORAL
Status: COMPLETED | OUTPATIENT
Start: 2023-01-31 | End: 2023-01-31

## 2023-01-31 RX ADMIN — IOPAMIDOL 40 ML: 755 INJECTION, SOLUTION INTRAVENOUS at 12:56

## 2023-01-31 RX ADMIN — IOPAMIDOL 80 ML: 755 INJECTION, SOLUTION INTRAVENOUS at 12:57

## 2023-01-31 RX ADMIN — CLOPIDOGREL BISULFATE 300 MG: 300 TABLET, FILM COATED ORAL at 14:52

## 2023-01-31 RX ADMIN — ASPIRIN 81 MG CHEWABLE TABLET 243 MG: 81 TABLET CHEWABLE at 14:52

## 2023-01-31 NOTE — PROGRESS NOTES
CSS   History and Physical    Subjective:      Wade Alvarez is a 81 y.o. female who was referred for cardiac evaluation by Dr. Toma Estevez (primary cardiologist Dr. Marcus Long). She has a PMH of bicuspid aortic valve s/p bioprosthetic AVR in July 2014 (w/patch to repair aorta), severe aortic stenosis, [employer] pacemaker r/t CHB after AVR in 2014, HFrEF (EF 35-40%),  HTN, known thoracic aortic aneursym, HLD, asthma, COPD, hx smoker, and GERD/dysphagia. She has been followed by S with repeat ECHOs through the years, most recent in October showed severe stenosis with a reduced ejection fraction. She was initially evaluated for TAVR but due to her anatomy (coronary arteries are very low, difficult to complete TAVR without blocking off) and her known ascending aneurysm, she was referred to Cardiac Surgery for evaluation. Mrs. Kt Herrera states she has been experiencing TINEO with associated chest tightness for the past year but it has gotten worse in the past month. She denies any palpitations or dizziness. However, she states today she had an episode where she was sitting at her desk and experienced loss of vision in her left eye that lasted for approximately 2-3 minutes, then normalized. She lives at home with her  Jonny G[place] and is independent of her ADLs. She still works in administration. She is COVID vaccinated (including boosters) but has not received her flu vaccine this year. She was a smoker but quit 20 years ago (smoked approximately 20 years), drinks 1x month, and denies recreational drug use. She has a family history significant for early CAD in her father and mother. Her brother also has a history of MI with stent placement. Cardiac Testing    Cardiac catheterization on 1/23/23:  Conclusion    Known bioprosthetic aortic valve stenosis, found to have mild CAD with aneurysmal ascending aorta. Aortic valve not crossed. Ascending aorta aneurysmal with 1+ aortic regurgitation.   Mild coronary artery disease in a right dominant system. Ostium of right coronary artery is very close to bioprosthetic AVR sewing ring by angiography. 5Fr Right radial sheath removed via patent hemostasis. Continue evaluation for surgical AVR and root replacement. Coronary Findings    Diagnostic  Dominance: Right  Left Main   The vessel is moderate in size. There is mild disease. Left Anterior Descending   The vessel is moderate in size. There is mild disease. Left Circumflex   The vessel is moderate in size. There is mild disease. Right Coronary Artery   The vessel is moderate in size. There is mild disease. Intervention    No interventions have been documented. ECHO 10/25/22 (per notes from [facility]):  EF 35-40%, Global hypokinesis, Normal RV. Moderate LVH. Bioprosthetic aortic valve with severe aortic stenosis (4.1 m/s, mean 39) and mild AI. Mild MR. Mild TR. Pap21, Ascending Ao 4.7cm.      Past Medical History:   Diagnosis Date    Aortic stenosis 2010    Arthritis     Asthma 2010    Congestive heart failure ([address]     heart disease    Elevated cholesterol 2010    elevated particle number    Ex-smoker 2010    Family history of diabetes mellitus (DM) 2010    Family history of early CAD 2010    GERD (gastroesophageal reflux disease)     Ill-defined condition     ALLERGIC TO MOLD AND MILDEW    Osteopenia 2010    Uterine fibroid 2010    Vitamin D deficiency 5/10/2010     Past Surgical History:   Procedure Laterality Date    ENDOSCOPY, COLON, DIAGNOSTIC      due     HX BREAST BIOPSY      BENIGN    HX GI      COLONOSCOPY    HX GYN  10/01    thermal ablation    [place] BREAST SURGERY PROCEDURE UNLISTED      clogged milk duct    NV CARDIAC SURG PROCEDURE UNLIST      CARDIAC CATH      Social History     Tobacco Use    Smoking status: Former     Packs/day: 0.25     Years: 30.00     Pack years: 7.50     Types: Cigarettes     Quit date: 10/1/2002     Years since quittin.3 Smokeless tobacco: Never    Tobacco comments:     on and off for 30 years   Substance Use Topics    Alcohol use: Yes     Alcohol/week: 0.8 standard drinks     Types: 1 Glasses of wine per week     Comment: maybe 1 glass/week      Family History   Problem Relation Age of Onset    Alcohol abuse Mother     Heart Disease Mother     Hypertension Mother     Elevated Lipids Mother     Alcohol abuse Father     Heart Disease Father     Diabetes Father     Elevated Lipids Father     Hypertension Father     Cancer Paternal Uncle         lung    Heart Disease Brother 46        MI, PTCA/stent    Alcohol abuse Brother     Cancer Daughter         skin    Alcohol abuse Son     Heart Disease Maternal Aunt     Heart Disease Maternal Uncle     Cancer Paternal Aunt         uterine    Heart Disease Maternal Aunt     Heart Disease Maternal Aunt     Heart Disease Maternal Aunt     Heart Disease Paternal Aunt      Prior to Admission medications    Medication Sig Start Date End Date Taking? Authorizing Provider   aspirin 81 mg chewable tablet Take 81 mg by mouth daily. Yes Provider, Historical   calcium-vits W6-P-Z5-minerals 166.75 mg- 166.75 unit cap calcium   Yes Provider, Historical   TURMERIC PO turmeric   Yes Provider, Historical   amoxicillin (AMOXIL) 500 mg capsule  8/19/21  Yes Provider, Historical   omeprazole (PRILOSEC) 40 mg capsule  9/8/21  Yes Provider, Historical   oxybutynin chloride XL (DITROPAN XL) 10 mg CR tablet  11/4/21  Yes Provider, Historical   losartan (COZAAR) 100 mg tablet Take 50 mg by mouth daily. Yes Provider, Historical   montelukast (SINGULAIR) 10 mg tablet TAKE 1 TABLET BY MOUTH EVERY DAY 6/11/13  Yes Lloyd Quan MD   atorvastatin (LIPITOR) 40 mg tablet Take 1 Tab by mouth daily. 9/24/12  Yes Lloyd Quan MD   MULTI-VITAMIN PO Take  by mouth. Yes Provider, Historical   cholecalciferol (VITAMIN D3) 400 unit tab tablet Take 1,000 Units by mouth daily.   Patient not taking: Reported on 1/31/2023 Provider, Historical       Allergies   Allergen Reactions    Morphine Other (comments)     Cold sweats & nightmares     Review of Systems: pertinent positives in HPI  Consititutional: Denies fever or chills. Eyes:  Denies use of glasses or vision problems(cataracts). ENT:  Denies hearing or swallowing difficulty. CV: Denies CP, claudication, HTN. Resp: Denies dyspnea, productive cough. : Denies dialysis or kidney problems. GI: Denies ulcers, esophageal strictures, liver problems. M/S: Denies joint or bone problems, or implanted artificial hardware. Skin: Denies varicose veins, edema. Neuro: Denies strokes, or TIAs. Psych: Denies anxiety or depression. Endocrine: Denies thyroid problems or diabetes. Heme/Lymphatic: Denies easy bruising or lymphedema. Objective:     VS: Visit Vitals  /89 (BP 1 Location: Left upper arm)   Pulse 87   Temp 97.7 °F (36.5 °C) (Oral)   Resp 14   Ht 5' (1.524 m)   Wt 168 lb (76.2 kg)   SpO2 97%   BMI 32.81 kg/m²     Physical Exam:    General appearance: alert, cooperative, no distress  Head: normocephalic, without obvious abnormality; atraumatic  Eyes: conjunctivae/corneas clear; EOM's intact. Nose: nares normal; no drainage. Neck: no carotid bruit and no JVD  Lungs: clear to auscultation bilaterally  Heart: regular rate and rhythm; +III/VI murmur  Abdomen: soft, non-tender; bowel sounds normal  Extremities: moves all extremities; no weakness. Skin: Skin color normal; No varicose veins or edema. Neurologic: Grossly normal      Labs: No results for input(s): WBC, HGB, HCT, PLT, NA, K, BUN, CREA, GLU, GLUCPOC, INR, HGBEXT, HCTEXT, PLTEXT, INREXT, HGBEXT, HCTEXT, PLTEXT, INREXT in the last 72 hours. No lab exists for component: [place]    Diagnostics:   PA and lateral 1/16/23:    Indication:  Abnormal result of cardiovascular function study, unspecified. Exam: PA and lateral views of the chest.     Direct comparison is made to prior CXR dated August 2014. Findings: Cardiomediastinal silhouette is within normal limits. Intact pacer  leads extending to the right atrium and right ventricle. Lungs are clear  bilaterally. Pleural spaces are normal. Osseous structures are intact. IMPRESSION  No acute cardiopulmonary disease. Carotid doppler: PAT    PFTS-FEV1: PAT    EKG: PAT  Assessment:     Active Problems:    * No active hospital problems. *      Plan:   Surgery is scheduled for February 7th 2023 with Dr. Nayaan Roldan for redo bioprosthetic AVR and repair of ascending aortic aneurysm. .    STS Risk Calculator V2.81 - Calculate once PAT completed  Mortality  Morbidity  Long length of stay  Short length of stay  Permanent Stroke  Prolonged ventilation  DSW infection  Renal failure  Reoperation      Treatment Plan:    Severe Aortic Stenosis: Hx bioprosthetic AVR in 2014, now with severe stenosis. Plan for re-do bioprosthetic AVR on February 7th. Pre-op testing pending. Continue 81mg ASA daily. Hx complete heart block r/t post op AVR: [employer] Harish pacemaker implanted in 2014 (with lead revision) patient states has 8 months of battery left on pacemaker. CHFrEF: EF 35% on ECHO in October 2022, some component could be related to severe AS. Will re-evaluate function post repair. HTN: PTA losartan 50mg daily. Will need to stop 48 hours prior to surgery. Aortic Aneursym: Known, last scan in our charts 2016 showed ascending aorta at 4cm, descending aorta 2.4cm. Dr. Nayana Roldan reviewed on most recent imaging, plans to repair in OR. Concern for TIA: Patient states today she had an episode of total loss of vision in her left eye that lasted approximately 2-3 minutes. She was working at her desk and it resolved, denies it ever happened before. Dr. Nayana Roldan asked patient to go to ED for evaluation, concern for TIA given known cardiac history. ED notified patient coming down, anticipate carotid doppler and ECHO will be done along with Head CT.      Dyslipidemia: PTA atorvastatin 40mg daily    Asthma: No inhalers noted, hx smoker. PFTs ordered. GERD: PTA omeprazole 40mg daily. Time Spent: I spent a total of 80 minutes on this consult.      Signed By: Marek Irvin NP     January 31, 2023

## 2023-01-31 NOTE — CONSULTS
Consult dictated. 14-year-old with aortic stenosis, planned for surgical repair later this week, hypertension presenting with a brief 1 minute episode of losing her vision completely in the left eye. No other symptoms. Possibly amaurosis fugax but duration was very brief. CTA preliminary is negative. Unable to get MRI. We will also check echocardiogram.  Currently on aspirin and statin which she should continue.     Jenna Alvarez MD

## 2023-01-31 NOTE — PROGRESS NOTES
Problem: Falls - Risk of  Goal: *Absence of Falls  Description: Document Shazia Nowak Fall Risk and appropriate interventions in the flowsheet.   Outcome: Progressing Towards Goal  Note: Fall Risk Interventions:                                Problem: Patient Education: Go to Patient Education Activity  Goal: Patient/Family Education  Outcome: Progressing Towards Goal     Problem: Patient Education: Go to Patient Education Activity  Goal: Patient/Family Education  Outcome: Progressing Towards Goal     Problem: TIA/CVA Stroke: 0-24 hours  Goal: Off Pathway (Use only if patient is Off Pathway)  Outcome: Progressing Towards Goal  Goal: Activity/Safety  Outcome: Progressing Towards Goal  Goal: Consults, if ordered  Outcome: Progressing Towards Goal  Goal: Diagnostic Test/Procedures  Outcome: Progressing Towards Goal  Goal: Nutrition/Diet  Outcome: Progressing Towards Goal  Goal: Discharge Planning  Outcome: Progressing Towards Goal  Goal: Medications  Outcome: Progressing Towards Goal  Goal: Respiratory  Outcome: Progressing Towards Goal  Goal: Treatments/Interventions/Procedures  Outcome: Progressing Towards Goal  Goal: Minimize risk of bleeding post-thrombolytic infusion  Outcome: Progressing Towards Goal  Goal: Monitor for complications post-thrombolytic infusion  Outcome: Progressing Towards Goal  Goal: Psychosocial  Outcome: Progressing Towards Goal  Goal: *Hemodynamically stable  Outcome: Progressing Towards Goal  Goal: *Neurologically stable  Description: Absence of additional neurological deficits    Outcome: Progressing Towards Goal  Goal: *Verbalizes anxiety and depression are reduced or absent  Outcome: Progressing Towards Goal  Goal: *Absence of Signs of Aspiration on Current Diet  Outcome: Progressing Towards Goal  Goal: *Absence of deep venous thrombosis signs and symptoms(Stroke Metric)  Outcome: Progressing Towards Goal  Goal: *Ability to perform ADLs and demonstrates progressive mobility and function  Outcome: Progressing Towards Goal  Goal: *Stroke education started(Stroke Metric)  Outcome: Progressing Towards Goal  Goal: *Dysphagia screen performed(Stroke Metric)  Outcome: Progressing Towards Goal  Goal: *Rehab consulted(Stroke Metric)  Outcome: Progressing Towards Goal     Problem: Ischemic Stroke: Discharge Outcomes  Goal: *Verbalizes anxiety and depression are reduced or absent  Outcome: Progressing Towards Goal  Goal: *Verbalize understanding of risk factor modification(Stroke Metric)  Outcome: Progressing Towards Goal  Goal: *Hemodynamically stable  Outcome: Progressing Towards Goal  Goal: *Absence of aspiration pneumonia  Outcome: Progressing Towards Goal  Goal: *Aware of needed dietary changes  Outcome: Progressing Towards Goal  Goal: *Verbalize understanding of prescribed medications including anti-coagulants, anti-lipid, and/or anti-platelets(Stroke Metric)  Outcome: Progressing Towards Goal  Goal: *Tolerating diet  Outcome: Progressing Towards Goal  Goal: *Aware of follow-up diagnostics related to anticoagulants  Outcome: Progressing Towards Goal  Goal: *Ability to perform ADLs and demonstrates progressive mobility and function  Outcome: Progressing Towards Goal  Goal: *Absence of DVT(Stroke Metric)  Outcome: Progressing Towards Goal  Goal: *Absence of aspiration  Outcome: Progressing Towards Goal  Goal: *Optimal pain control at patient's stated goal  Outcome: Progressing Towards Goal  Goal: *Home safety concerns addressed  Outcome: Progressing Towards Goal  Goal: *Describes available resources and support systems  Outcome: Progressing Towards Goal  Goal: *Verbalizes understanding of activation of EMS(911) for stroke symptoms(Stroke Metric)  Outcome: Progressing Towards Goal  Goal: *Understands and describes signs and symptoms to report to providers(Stroke Metric)  Outcome: Progressing Towards Goal  Goal: *Neurolgocially stable (absence of additional neurological deficits)  Outcome: Progressing Towards Goal  Goal: *Verbalizes importance of follow-up with primary care physician(Stroke Metric)  Outcome: Progressing Towards Goal  Goal: *Smoking cessation discussed,if applicable(Stroke Metric)  Outcome: Progressing Towards Goal  Goal: *Depression screening completed(Stroke Metric)  Outcome: Progressing Towards Goal

## 2023-01-31 NOTE — ED PROVIDER NOTES
Patient is a 80-year-old female presented the ED with complete left eye vision loss for 3 to 5 minutes that is since resolved. Tier 1 code stroke called based on timing. No neurodeficits at this time. Patient back to baseline. The history is provided by the patient and a relative. Vision Change   Pertinent negatives include no numbness and no weakness.       Past Medical History:   Diagnosis Date    Aortic stenosis 5/4/2010    Arthritis     Asthma 5/4/2010    Congestive heart failure ([address]     heart disease    Elevated cholesterol 5/4/2010    elevated particle number    Ex-smoker 5/4/2010    Family history of diabetes mellitus (DM) 5/4/2010    Family history of early CAD 5/4/2010    GERD (gastroesophageal reflux disease)     Ill-defined condition     ALLERGIC TO MOLD AND MILDEW    Osteopenia 5/4/2010    Uterine fibroid 5/4/2010    Vitamin D deficiency 5/10/2010       Past Surgical History:   Procedure Laterality Date    ENDOSCOPY, COLON, DIAGNOSTIC  6/04    due 6/14    HX BREAST BIOPSY  6/06    BENIGN    HX GI      COLONOSCOPY    HX GYN  10/01    thermal ablation    [place] BREAST SURGERY PROCEDURE UNLISTED  1978    clogged milk duct    [place] CARDIAC SURG PROCEDURE UNLIST  2014    CARDIAC CATH         Family History:   Problem Relation Age of Onset    Alcohol abuse Mother     Heart Disease Mother     Hypertension Mother     Elevated Lipids Mother     Alcohol abuse Father     Heart Disease Father     Diabetes Father     Elevated Lipids Father     Hypertension Father     Cancer Paternal Uncle         lung    Heart Disease Brother 46        MI, PTCA/stent    Alcohol abuse Brother     Cancer Daughter         skin    Alcohol abuse Son     Heart Disease Maternal Aunt     Heart Disease Maternal Uncle     Cancer Paternal Aunt         uterine    Heart Disease Maternal Aunt     Heart Disease Maternal Aunt     Heart Disease Maternal Aunt     Heart Disease Paternal Aunt        Social History     Socioeconomic History    Marital status:      Spouse name: Not on file    Number of children: Not on file    Years of education: Not on file    Highest education level: Not on file   Occupational History    Not on file   Tobacco Use    Smoking status: Former     Packs/day: 0.25     Years: 30.00     Pack years: 7.50     Types: Cigarettes     Quit date: 10/1/2002     Years since quittin.3    Smokeless tobacco: Never    Tobacco comments:     on and off for 30 years   Substance and Sexual Activity    Alcohol use: Yes     Alcohol/week: 0.8 standard drinks     Types: 1 Glasses of wine per week     Comment: maybe 1 glass/week    Drug use: No    Sexual activity: Not Currently   Other Topics Concern    Not on file   Social History Narrative    Not on file     Social Determinants of Health     Financial Resource Strain: Not on file   Food Insecurity: Not on file   Transportation Needs: Not on file   Physical Activity: Not on file   Stress: Not on file   Social Connections: Not on file   Intimate Partner Violence: Not on file   Housing Stability: Not on file         ALLERGIES: Morphine    Review of Systems   Constitutional:  Negative for activity change and appetite change. Eyes:  Positive for visual disturbance. Respiratory:  Negative for shortness of breath. Cardiovascular:  Negative for chest pain. Neurological:  Negative for syncope, facial asymmetry, speech difficulty, weakness, light-headedness and numbness. Vitals:    23 1226 23 1310 23 1349   BP: (!) 165/92 (!) 142/97 125/82   Pulse: 89 91    Resp: 16 21 18   Temp: 96.9 °F (36.1 °C) 98.3 °F (36.8 °C)    SpO2: 96% 95% 98%   Weight: 76.2 kg (167 lb 15.9 oz) 76.4 kg (168 lb 6.9 oz)    Height: 5' (1.524 m) 5' (1.524 m)             Physical Exam  Vitals and nursing note reviewed. Constitutional:       Appearance: Normal appearance. HENT:      Head: Normocephalic and atraumatic.       Right Ear: External ear normal.      Left Ear: External ear normal.   Eyes: Conjunctiva/sclera: Conjunctivae normal.   Cardiovascular:      Rate and Rhythm: Normal rate and regular rhythm. Pulses: Normal pulses. Heart sounds: Normal heart sounds. Pulmonary:      Effort: Pulmonary effort is normal.      Breath sounds: Normal breath sounds. Chest:      Chest wall: No deformity or tenderness. Abdominal:      Palpations: Abdomen is soft. Tenderness: There is no abdominal tenderness. Musculoskeletal:         General: No deformity or signs of injury. Normal range of motion. Skin:     General: Skin is warm and dry. Coloration: Skin is not jaundiced. Neurological:      General: No focal deficit present. Mental Status: She is alert and oriented to person, place, and time. Psychiatric:         Mood and Affect: Mood normal.         Behavior: Behavior normal.        Medical Decision Making  Amount and/or Complexity of Data Reviewed  Independent Historian: caregiver  Labs: ordered. Decision-making details documented in ED Course. Radiology: ordered and independent interpretation performed. Decision-making details documented in ED Course. ECG/medicine tests: ordered and independent interpretation performed. Decision-making details documented in ED Course. Discussion of management or test interpretation with external provider(s): Discussed patient with teleneurology    Risk  OTC drugs. Prescription drug management. Decision regarding hospitalization. ED Course as of 01/31/23 1932 Tue Jan 31, 2023   1425 ED physician interpretation EKG. Atrial sensed ventricular paced rhythm. Rate 84.  [AL]   1930 Troponin-High Sensitivity(!): 54  Recent heart cath [AL]   1931 Sodium(!): 134 [AL]   1931 Creatinine(!): 0.48 [AL]   1931 WBC: 5.9 [AL]   1931 HGB: 13.7 [AL]   1931 CT CODE NEURO HEAD WO CONTRAST  No acute bleeds [AL]      ED Course User Index  [AL] Arminda Paredes MD       LABORATORY RESULTS:  Labs Reviewed   CBC WITH AUTOMATED DIFF - Abnormal; Notable for the following components:       Result Value    PLATELET 348 (*)     IMMATURE GRANULOCYTES 1 (*)     All other components within normal limits   METABOLIC PANEL, COMPREHENSIVE - Abnormal; Notable for the following components:    Sodium 134 (*)     Anion gap 3 (*)     Creatinine 0.48 (*)     BUN/Creatinine ratio 21 (*)     AST (SGOT) 14 (*)     All other components within normal limits   TROPONIN-HIGH SENSITIVITY - Abnormal; Notable for the following components:    Troponin-High Sensitivity 54 (*)     All other components within normal limits   PROTHROMBIN TIME + INR   SAMPLES BEING HELD   SAMPLES BEING HELD   GLUCOSE, POC       IMAGING RESULTS:  XR CHEST PORT   Final Result         Minimal interstitial edema versus alternate interstitial process. Recommend   follow-up. CTA CODE NEURO HEAD AND NECK W CONT         CT CODE NEURO PERF W CBF         CT CODE NEURO HEAD WO CONTRAST   Final Result   No acute intracranial process identified          MEDICATIONS GIVEN:  Medications   clopidogreL (PLAVIX) tablet 300 mg (has no administration in time range)   aspirin chewable tablet 243 mg (has no administration in time range)   iopamidoL (ISOVUE-370) 370 mg iodine /mL (76 %) injection 100 mL (80 mL IntraVENous Given 1/31/23 1257)   iopamidoL (ISOVUE-370) 370 mg iodine /mL (76 %) injection 50 mL (40 mL IntraVENous Given 1/31/23 1256)       Differential diagnosis: Stroke, transient vision loss, TIA    ED physician interpretation of imaging: CT head without acute bleeds  ED physician interpretation of EKG: No STEMI. See my interpretation EKG in ED course above. ED physician interpretation of laboratory results: No critical values require emergency medical invention. MDM: Patient is a 11year-old female presented the ED with brief period of transient visual loss requiring code stroke activation, CT and CTA as well as teleneurology evaluation. Patient may have had TIA. Will require hospitalization for MRI and MRA. Patient given Plavix and aspirin. Patient admitted to hospitalist service for further treatment evaluation. DISPOSITION: Admitted    [address] for Admission  2:42 PM    ED Room Number: ER08/08  Patient Name and age:  Bruno Morales 81 y.o.  female  Working Diagnosis:   1. TIA (transient ischemic attack)        COVID-19 Suspicion:  no  Sepsis present:  no  Reassessment needed: no  Code Status:  Full Code  Readmission: no  Isolation Requirements:  no  Recommended Level of Care:  med/surg  Department: [facility] Adult ED - 21     Other: TIA with acute left eye vision loss now resolved. Teleneurology recommends hospital admission for MRI and MRA brain with and without contrast.  Echo with bubble study. Leobardo Smith.  Dusty Templeton MD      Procedures

## 2023-01-31 NOTE — ED NOTES
Second attempt to call report to SHASHANK Lezama and told she is at the pharmacy.  Asked to have her call me back

## 2023-01-31 NOTE — H&P
[facility] Adult  Hospitalist Group  History and Physical    Date of Service:  1/31/2023  Primary Care Provider: Hershel Curling, MD  Source of information: Chart review    Chief Complaint: Vision Change      History of Presenting Illness:   Josh Dukes is a 81 y.o. female with a PMH of CAD- pacemaker [employer], Severe Aortic Stenosis/ Bicuspid Aortic Valve s/p Bioprosthetic AVR in July 2014 (w/patch to repair aorta), HFrEF 35-40%, Hx Thoracic Aortic Aneursym, Arthritis, COPD/Asthma, Hx Tobacco Dependence, HLD, GERD and Dysphagia who was referred for cardiac evaluation by [facility] Dr. Lisa Sanders (primary cardiologist Dr. Higinio Hills). ysm, she was referred to Cardiac Surgery for evaluation. Patient with c/o worsening SOB and TINEO with associated chest tightness x1year, worst last month before presentation. She denied palpitations, no dizziness however, she reported sudden loss of vision in her left eye that self resolved in 2-3 minutes. The patient denied headache, sore throat, trouble swallowing, trouble with speech, chest pain, fever, chills, N/V/D, abd pain, urinary symptoms, constipation, recent travels, sick contacts, focal or generalized neurological symptoms, falls, injuries, rashes, contact with COVID-19 diagnosed patients, hematemesis, melena, hemoptysis, hematuria, rashes, denied starting any new medications and denies any other concerns or problems besides as mentioned above. REVIEW OF SYSTEMS:  A comprehensive review of systems was negative except for that written in the History of Present Illness.      Past Medical History:   Diagnosis Date    Aortic stenosis 5/4/2010    Arthritis     Asthma 5/4/2010    Congestive heart failure ([facility] [address]     heart disease    Elevated cholesterol 5/4/2010    elevated particle number    Ex-smoker 5/4/2010    Family history of diabetes mellitus (DM) 5/4/2010    Family history of early CAD 5/4/2010    GERD (gastroesophageal reflux disease) Ill-defined condition     ALLERGIC TO MOLD AND MILDEW    Osteopenia 5/4/2010    Uterine fibroid 5/4/2010    Vitamin D deficiency 5/10/2010      Past Surgical History:   Procedure Laterality Date    ENDOSCOPY, COLON, DIAGNOSTIC  6/04    due 6/14    HX BREAST BIOPSY  6/06    BENIGN    HX GI      COLONOSCOPY    HX GYN  10/01    thermal ablation    NV BREAST SURGERY PROCEDURE UNLISTED  1978    clogged milk duct    [place] CARDIAC SURG PROCEDURE UNLIST  2014    CARDIAC CATH     Prior to Admission medications    Medication Sig Start Date End Date Taking? Authorizing Provider   aspirin 81 mg chewable tablet Take 81 mg by mouth daily. Provider, Historical   calcium-vits Q3-J-N9-minerals 166.75 mg- 166.75 unit cap calcium    Provider, Historical   TURMERIC PO turmeric    Provider, Historical   amoxicillin (AMOXIL) 500 mg capsule  8/19/21   Provider, Historical   omeprazole (PRILOSEC) 40 mg capsule  9/8/21   Provider, Historical   oxybutynin chloride XL (DITROPAN XL) 10 mg CR tablet  11/4/21   Provider, Historical   losartan (COZAAR) 100 mg tablet Take 50 mg by mouth daily. Provider, Historical   cholecalciferol (VITAMIN D3) 400 unit tab tablet Take 1,000 Units by mouth daily. Patient not taking: Reported on 1/31/2023    Provider, Historical   montelukast (SINGULAIR) 10 mg tablet TAKE 1 TABLET BY MOUTH EVERY DAY 6/11/13   Roni Smith MD   atorvastatin (LIPITOR) 40 mg tablet Take 1 Tab by mouth daily. 9/24/12   Roni Smith MD   MULTI-VITAMIN PO Take  by mouth.     Provider, Historical     Allergies   Allergen Reactions    Morphine Other (comments)     Cold sweats & nightmares      Family History   Problem Relation Age of Onset    Alcohol abuse Mother     Heart Disease Mother     Hypertension Mother     Elevated Lipids Mother     Alcohol abuse Father     Heart Disease Father     Diabetes Father     Elevated Lipids Father     Hypertension Father     Cancer Paternal Uncle         lung    Heart Disease Brother 46        MI, PTCA/stent    Alcohol abuse Brother     Cancer Daughter         skin    Alcohol abuse Son     Heart Disease Maternal Aunt     Heart Disease Maternal Uncle     Cancer Paternal Aunt         uterine    Heart Disease Maternal Aunt     Heart Disease Maternal Aunt     Heart Disease Maternal Aunt     Heart Disease Paternal Aunt       Social History:  reports that she quit smoking about 20 years ago. She has a 7.50 pack-year smoking history. She has never used smokeless tobacco. She reports current alcohol use of about 0.8 standard drinks per week. She reports that she does not use drugs. Social Determinants of Health     Tobacco Use: Not on file   Alcohol Use: Not on file   Financial Resource Strain: Not on file   Food Insecurity: Not on file   Transportation Needs: Not on file   Physical Activity: Not on file   Stress: Not on file   Social Connections: Not on file   Intimate Partner Violence: Not on file   Depression: Not on file   Housing Stability: Not on file        Medications were reconciled to the best of my ability given all available resources at the time of admission. Route is PO if not otherwise noted. Family and social history were personally reviewed, all pertinent and relevant details are outlined as above. Objective:   Visit Vitals  BP (!) 136/91   Pulse 91   Temp 98.3 °F (36.8 °C)   Resp 12   Ht 5' (1.524 m)   Wt 76.4 kg (168 lb 6.9 oz)   SpO2 98%   BMI 32.89 kg/m²      O2 Device: None    PHYSICAL EXAM:       Constitutional:  No acute distress, cooperative, pleasant    ENT:  Oral mucosa moist. EOMI, no vision. Resp:  CTA bilaterally. No wheezing/rhonchi/rales. No accessory muscle use. CV:  Regular rhythm, normal rate, S1,S2.    GI/:  Soft, non distended, non tender, no guarding, BS present. Voids Freely. Musculoskeletal:  No edema, warm. Neurologic:  Moves all extremities. AAOx3. Skin:  w/d. Psych:  Not anxious nor agitated.      Data Review:   I have independently reviewed and interpreted patient's lab and all other diagnostic data    Abnormal Labs Reviewed   CBC WITH AUTOMATED DIFF - Abnormal; Notable for the following components:       Result Value    PLATELET 681 (*)     IMMATURE GRANULOCYTES 1 (*)     All other components within normal limits   METABOLIC PANEL, COMPREHENSIVE - Abnormal; Notable for the following components:    Sodium 134 (*)     Anion gap 3 (*)     Creatinine 0.48 (*)     BUN/Creatinine ratio 21 (*)     AST (SGOT) 14 (*)     All other components within normal limits   TROPONIN-HIGH SENSITIVITY - Abnormal; Notable for the following components:    Troponin-High Sensitivity 54 (*)     All other components within normal limits       All Micro Results       None            IMAGING:   XR CHEST PORT   Final Result         Minimal interstitial edema versus alternate interstitial process. Recommend   follow-up. CTA CODE NEURO HEAD AND NECK W CONT         CT CODE NEURO PERF W CBF         CT CODE NEURO HEAD WO CONTRAST   Final Result   No acute intracranial process identified           ECG/ECHO:    Results for orders placed or performed during the hospital encounter of 02/29/16   EKG, 12 LEAD, INITIAL   Result Value Ref Range    Ventricular Rate 65 BPM    Atrial Rate 65 BPM    P-R Interval 196 ms    QRS Duration 142 ms    Q-T Interval 476 ms    QTC Calculation (Bezet) 495 ms    Calculated P Axis 42 degrees    Calculated R Axis -76 degrees    Calculated T Axis 88 degrees    Diagnosis       Atrial-sensed ventricular-paced rhythm  When compared with ECG of 20-AUG-2014 07:20,  Vent. rate has decreased BY   9 BPM  Confirmed by Ravindra Samuels ([id number]) on 2/29/2016 1:39:36 PM     Results for orders placed or performed in visit on 10/08/10   AMB POC EKG ROUTINE W/ 12 LEADS, INTER & REP    Impression    Normal, no change since 4/08. Notes reviewed from all clinical/nonclinical/nursing services involved in patient's clinical care.  Care coordination discussions were held with appropriate clinical/nonclinical/ nursing providers based on care coordination needs. Assessment:   Given the patient's current clinical presentation, there is a high level of concern for decompensation if discharged from the emergency department. Complex decision making was performed, which includes reviewing the patient's available past medical records, laboratory results, and imaging studies. Active Problems:    TIA (transient ischemic attack) (1/31/2023)        Plan:   TIA: loss of vision of left eye. Code stroke: Camelia Rim negative, no TNK.  - CT head 1/31: No acute intracranial process identified.  - CTA Head/neck 1/31: Ascending aorta measures 4.4 cm. No large vessel occlusion, multinodular goiter. - ASA, Plavix  - Neurology   - ST/PT/OT    CAD/Pacemaker ([employer])/Severe Aortic Stenosis/ Bicuspid Aortic Valve (s/p Bioprosthetic AVR in July 2014, w/patch to repair aorta), HFrEF 35-40%, Hx Thoracic Aortic Aneurysm/HLD/HTN:  - CardioThoracic: Plan for re-do bioprosthetic AVR on February 7th. HOLD losartan 48hrs before. - lipid panel  - HgA1c  - tele  - continue home statin, losartan    COPD/Asthma/Hx Tobacco Dependence:  - prn O2  - continue home Singulair     GERD/Dysphagia: SLP, protonix. DIET: ADULT DIET Regular; 4 carb choices (60 gm/meal)   ISOLATION PRECAUTIONS: There are currently no Active Isolations  CODE STATUS: Full Code   DVT PROPHYLAXIS: SCDs  FUNCTIONAL STATUS PRIOR TO HOSPITALIZATION: Fully active and ambulatory; able to carry on all self-care without restriction. Ambulatory status/function: By self   EARLY MOBILITY ASSESSMENT: Recommend routine ambulation while hospitalized with the assistance of nursing staff  ANTICIPATED DISCHARGE: Greater than 48 hours.   ANTICIPATED DISPOSITION: Home  EMERGENCY CONTACT/SURROGATE DECISION MAKER: spouseBrooks Bounds: [phone]    CRITICAL CARE WAS PERFORMED FOR THIS ENCOUNTER: NO.      Signed By: Yaya Banuelos NP     January 31, 2023 Please note that this dictation may have been completed with Dragon, the computer voice recognition software. Quite often unanticipated grammatical, syntax, homophones, and other interpretive errors are inadvertently transcribed by the computer software. Please disregard these errors. Please excuse any errors that have escaped final proofreading.

## 2023-01-31 NOTE — PROGRESS NOTES
Neurocritical Care Code Stroke Documentation      Symptoms: Loss of vision in left eye   Baseline mRS:      Last Known Well: [facility] hx: Past Medical History:   Diagnosis Date    Aortic stenosis 2010    Arthritis     Asthma 2010    Congestive heart failure ([address]     heart disease    Elevated cholesterol 2010    elevated particle number    Ex-smoker 2010    Family history of diabetes mellitus (DM) 2010    Family history of early CAD 2010    GERD (gastroesophageal reflux disease)     Ill-defined condition     ALLERGIC TO MOLD AND MILDEW    Osteopenia 2010    Uterine fibroid 2010    Vitamin D deficiency 5/10/2010      Anticoagulation: asa   VAN:   Negative   NIHSS:   1a-LOC:0    1b-Month/Age:0    1c-Open/Close Hand:0    2-Best Gaze:0    3-Visual Fields:0    4-Facial Palsy:0    5a-Left Arm:0    5b-Right Arm:0    6a-Left Le    6b-Right Le    7-Limb Ataxia:0    8-Sensory:0    9-Best Language:0    10-Dysarthria:0    11-Extinction/Inattention:0  TOTAL SCORE:0   Imaging:   CT - No acute process/hemorrhage    CTA - No LVO     CTP - No abnormality seen   Plan: TNK Candidate: NO  Mechanical thrombectomy Candidate: NO     *Perform dysphagia screening prior to any PO intake*    Discussed with: Dr. Heather Tovar time: [id number]  Time spent: 25 minutes.      Namrata Reynoso NP  Neurocritical Care Nurse Practitioner  [phone]

## 2023-01-31 NOTE — ED TRIAGE NOTES
Sitting at desk at 09:30, L eye suddenly went totally blurry for 2-3 minutes then vision returned. Sent by MD Porsha Lobo to r/o TIA. Has pacemaker. CODE S level 1 initiated by MD Hyde.

## 2023-02-01 ENCOUNTER — APPOINTMENT (OUTPATIENT)
Dept: VASCULAR SURGERY | Age: 73
DRG: 069 | End: 2023-02-01
Attending: INTERNAL MEDICINE
Payer: COMMERCIAL

## 2023-02-01 ENCOUNTER — APPOINTMENT (OUTPATIENT)
Dept: NON INVASIVE DIAGNOSTICS | Age: 73
DRG: 069 | End: 2023-02-01
Attending: PSYCHIATRY & NEUROLOGY
Payer: COMMERCIAL

## 2023-02-01 VITALS
BODY MASS INDEX: 33.07 KG/M2 | OXYGEN SATURATION: 96 % | TEMPERATURE: 97.7 F | WEIGHT: 168.43 LBS | HEART RATE: 91 BPM | SYSTOLIC BLOOD PRESSURE: 122 MMHG | DIASTOLIC BLOOD PRESSURE: 76 MMHG | RESPIRATION RATE: 25 BRPM | HEIGHT: 60 IN

## 2023-02-01 LAB
ALBUMIN SERPL-MCNC: 3.5 G/DL (ref 3.5–5)
ALBUMIN/GLOB SERPL: 1 (ref 1.1–2.2)
ALP SERPL-CCNC: 78 U/L (ref 45–117)
ALT SERPL-CCNC: 22 U/L (ref 12–78)
ANION GAP SERPL CALC-SCNC: 8 MMOL/L (ref 5–15)
ASPIRIN TEST, ASPIRN: 411 ARU
AST SERPL-CCNC: 12 U/L (ref 15–37)
BILIRUB SERPL-MCNC: 0.4 MG/DL (ref 0.2–1)
BUN SERPL-MCNC: 11 MG/DL (ref 6–20)
BUN/CREAT SERPL: 22 (ref 12–20)
CALCIUM SERPL-MCNC: 8.8 MG/DL (ref 8.5–10.1)
CHLORIDE SERPL-SCNC: 106 MMOL/L (ref 97–108)
CHOLEST SERPL-MCNC: 143 MG/DL
CO2 SERPL-SCNC: 24 MMOL/L (ref 21–32)
CREAT SERPL-MCNC: 0.49 MG/DL (ref 0.55–1.02)
ECHO AO SINUS VALSALVA DIAM: 4.2 CM
ECHO AO SINUS VALSALVA INDEX: 2.41 CM/M2
ECHO AV AREA PEAK VELOCITY: 0.1 CM2
ECHO AV AREA VTI: 0.7 CM2
ECHO AV AREA/BSA PEAK VELOCITY: 0.1 CM2/M2
ECHO AV AREA/BSA VTI: 0.4 CM2/M2
ECHO AV MEAN GRADIENT: 45 MMHG
ECHO AV MEAN VELOCITY: 3.1 M/S
ECHO AV PEAK GRADIENT: 65 MMHG
ECHO AV PEAK VELOCITY: 4 M/S
ECHO AV VELOCITY RATIO: 0.25
ECHO AV VTI: 90.2 CM
ECHO EST RA PRESSURE: 3 MMHG
ECHO LV E' LATERAL VELOCITY: 3 CM/S
ECHO LV E' SEPTAL VELOCITY: 2 CM/S
ECHO LV FRACTIONAL SHORTENING: 22 % (ref 28–44)
ECHO LV INTERNAL DIMENSION DIASTOLE INDEX: 3.1 CM/M2
ECHO LV INTERNAL DIMENSION DIASTOLIC: 5.4 CM (ref 3.9–5.3)
ECHO LV INTERNAL DIMENSION SYSTOLIC INDEX: 2.41 CM/M2
ECHO LV INTERNAL DIMENSION SYSTOLIC: 4.2 CM
ECHO LV IVSD: 1 CM (ref 0.6–0.9)
ECHO LV MASS 2D: 206.7 G (ref 67–162)
ECHO LV MASS INDEX 2D: 118.8 G/M2 (ref 43–95)
ECHO LV POSTERIOR WALL DIASTOLIC: 1 CM (ref 0.6–0.9)
ECHO LV RELATIVE WALL THICKNESS RATIO: 0.37
ECHO LVOT AREA: 3.1 CM2
ECHO LVOT AV VTI INDEX: 0.25
ECHO LVOT DIAM: 2 CM
ECHO LVOT MEAN GRADIENT: 2 MMHG
ECHO LVOT PEAK GRADIENT: 4 MMHG
ECHO LVOT PEAK VELOCITY: 1 M/S
ECHO LVOT STROKE VOLUME INDEX: 41.3 ML/M2
ECHO LVOT SV: 71.9 ML
ECHO LVOT VTI: 22.9 CM
ECHO MV A VELOCITY: 0.61 M/S
ECHO MV AREA PHT: 8.7 CM2
ECHO MV E DECELERATION TIME (DT): 87 MS
ECHO MV E VELOCITY: 1.31 M/S
ECHO MV E/A RATIO: 2.15
ECHO MV E/E' LATERAL: 43.67
ECHO MV E/E' RATIO (AVERAGED): 54.58
ECHO MV E/E' SEPTAL: 65.5
ECHO MV PRESSURE HALF TIME (PHT): 25.2 MS
ECHO MV REGURGITANT PEAK GRADIENT: 10 MMHG
ECHO MV REGURGITANT PEAK VELOCITY: 1.6 M/S
ECHO PV MAX VELOCITY: 1 M/S
ECHO PV PEAK GRADIENT: 4 MMHG
ECHO RIGHT VENTRICULAR SYSTOLIC PRESSURE (RVSP): 12 MMHG
ECHO RV FREE WALL PEAK S': 11 CM/S
ECHO RV TAPSE: 1.4 CM (ref 1.7–?)
ECHO TV REGURGITANT MAX VELOCITY: 1.52 M/S
ECHO TV REGURGITANT PEAK GRADIENT: 10 MMHG
ERYTHROCYTE [DISTWIDTH] IN BLOOD BY AUTOMATED COUNT: 13.6 % (ref 11.5–14.5)
EST. AVERAGE GLUCOSE BLD GHB EST-MCNC: 120 MG/DL
GLOBULIN SER CALC-MCNC: 3.5 G/DL (ref 2–4)
GLUCOSE SERPL-MCNC: 103 MG/DL (ref 65–100)
HBA1C MFR BLD: 5.8 % (ref 4–5.6)
HCT VFR BLD AUTO: 43.2 % (ref 35–47)
HDLC SERPL-MCNC: 52 MG/DL
HDLC SERPL: 2.8 (ref 0–5)
HGB BLD-MCNC: 13.8 G/DL (ref 11.5–16)
LDLC SERPL CALC-MCNC: 76.2 MG/DL (ref 0–100)
LEFT CCA DIST DIAS: 21.5 CM/S
LEFT CCA DIST SYS: 69.7 CM/S
LEFT CCA PROX DIAS: 15.8 CM/S
LEFT CCA PROX SYS: 59.7 CM/S
LEFT ECA DIAS: 8.7 CM/S
LEFT ECA SYS: 63.8 CM/S
LEFT ICA DIST DIAS: 30.9 CM/S
LEFT ICA DIST SYS: 98.5 CM/S
LEFT ICA MID DIAS: 30.9 CM/S
LEFT ICA MID SYS: 102.4 CM/S
LEFT ICA PROX DIAS: 31.9 CM/S
LEFT ICA PROX SYS: 86.2 CM/S
LEFT ICA/CCA SYS: 1.5 NO UNITS
LEFT VERTEBRAL DIAS: 8.2 CM/S
LEFT VERTEBRAL SYS: 29.1 CM/S
MAGNESIUM SERPL-MCNC: 2 MG/DL (ref 1.6–2.4)
MCH RBC QN AUTO: 28.6 PG (ref 26–34)
MCHC RBC AUTO-ENTMCNC: 31.9 G/DL (ref 30–36.5)
MCV RBC AUTO: 89.6 FL (ref 80–99)
NRBC # BLD: 0 K/UL (ref 0–0.01)
NRBC BLD-RTO: 0 PER 100 WBC
PLATELET # BLD AUTO: 148 K/UL (ref 150–400)
PMV BLD AUTO: 12 FL (ref 8.9–12.9)
POTASSIUM SERPL-SCNC: 3.8 MMOL/L (ref 3.5–5.1)
PROT SERPL-MCNC: 7 G/DL (ref 6.4–8.2)
RBC # BLD AUTO: 4.82 M/UL (ref 3.8–5.2)
RIGHT CCA DIST DIAS: 22 CM/S
RIGHT CCA DIST SYS: 75.5 CM/S
RIGHT CCA PROX DIAS: 10.3 CM/S
RIGHT CCA PROX SYS: 53.3 CM/S
RIGHT ECA DIAS: 9 CM/S
RIGHT ECA SYS: 66.3 CM/S
RIGHT ICA DIST DIAS: 11.1 CM/S
RIGHT ICA DIST SYS: 37.3 CM/S
RIGHT ICA MID DIAS: 12.4 CM/S
RIGHT ICA MID SYS: 47 CM/S
RIGHT ICA PROX DIAS: 19.2 CM/S
RIGHT ICA PROX SYS: 70 CM/S
RIGHT ICA/CCA SYS: 0.9 NO UNITS
RIGHT VERTEBRAL DIAS: 14.9 CM/S
RIGHT VERTEBRAL SYS: 36.4 CM/S
SODIUM SERPL-SCNC: 138 MMOL/L (ref 136–145)
TRIGL SERPL-MCNC: 74 MG/DL (ref ?–150)
VLDLC SERPL CALC-MCNC: 14.8 MG/DL
WBC # BLD AUTO: 6.6 K/UL (ref 3.6–11)

## 2023-02-01 PROCEDURE — 97165 OT EVAL LOW COMPLEX 30 MIN: CPT

## 2023-02-01 PROCEDURE — 97112 NEUROMUSCULAR REEDUCATION: CPT

## 2023-02-01 PROCEDURE — 74011250637 HC RX REV CODE- 250/637: Performed by: HOSPITALIST

## 2023-02-01 PROCEDURE — 74011000250 HC RX REV CODE- 250: Performed by: INTERNAL MEDICINE

## 2023-02-01 PROCEDURE — 74011250636 HC RX REV CODE- 250/636: Performed by: INTERNAL MEDICINE

## 2023-02-01 PROCEDURE — 74011250637 HC RX REV CODE- 250/637: Performed by: NURSE PRACTITIONER

## 2023-02-01 PROCEDURE — 85027 COMPLETE CBC AUTOMATED: CPT

## 2023-02-01 PROCEDURE — C8929 TTE W OR WO FOL WCON,DOPPLER: HCPCS

## 2023-02-01 PROCEDURE — 36415 COLL VENOUS BLD VENIPUNCTURE: CPT

## 2023-02-01 PROCEDURE — 97161 PT EVAL LOW COMPLEX 20 MIN: CPT

## 2023-02-01 PROCEDURE — 97116 GAIT TRAINING THERAPY: CPT

## 2023-02-01 PROCEDURE — 93880 EXTRACRANIAL BILAT STUDY: CPT

## 2023-02-01 PROCEDURE — 85576 BLOOD PLATELET AGGREGATION: CPT

## 2023-02-01 PROCEDURE — 80061 LIPID PANEL: CPT

## 2023-02-01 PROCEDURE — 80053 COMPREHEN METABOLIC PANEL: CPT

## 2023-02-01 PROCEDURE — 83735 ASSAY OF MAGNESIUM: CPT

## 2023-02-01 PROCEDURE — G0378 HOSPITAL OBSERVATION PER HR: HCPCS

## 2023-02-01 PROCEDURE — 92610 EVALUATE SWALLOWING FUNCTION: CPT

## 2023-02-01 PROCEDURE — 83036 HEMOGLOBIN GLYCOSYLATED A1C: CPT

## 2023-02-01 RX ADMIN — PERFLUTREN 1 ML: 6.52 INJECTION, SUSPENSION INTRAVENOUS at 09:28

## 2023-02-01 RX ADMIN — ATORVASTATIN CALCIUM 40 MG: 40 TABLET, FILM COATED ORAL at 08:10

## 2023-02-01 RX ADMIN — MONTELUKAST 10 MG: 10 TABLET, FILM COATED ORAL at 08:10

## 2023-02-01 RX ADMIN — LOSARTAN POTASSIUM 50 MG: 50 TABLET, FILM COATED ORAL at 08:10

## 2023-02-01 RX ADMIN — PANTOPRAZOLE SODIUM 40 MG: 40 TABLET, DELAYED RELEASE ORAL at 06:38

## 2023-02-01 RX ADMIN — OXYBUTYNIN CHLORIDE 10 MG: 5 TABLET, EXTENDED RELEASE ORAL at 08:10

## 2023-02-01 RX ADMIN — ASPIRIN 81 MG: 81 TABLET, CHEWABLE ORAL at 08:10

## 2023-02-01 NOTE — DISCHARGE INSTRUCTIONS
Please bring this form with you to show your primary care provider at your follow-up appointment. Primary care provider:   @[facility]@    Discharging provider:  Vijay Rider MD    You have been admitted to the hospital with the following diagnoses:  TIA (transient ischemic attack) [G45.9]    FOLLOW-UP CARE RECOMMENDATIONS:    APPOINTMENTS:  Follow-up with primary care provider,  @[facility]@  -  Please call [unfilled] shortly after discharge to set up an appointment to be seen in  1 week    Neurology as needed   CTS as scheduled     FOLLOW-UP TESTS recommended: none     PENDING TEST RESULTS:  At the time of your discharge the following test results are still pending: none   Please make sure you review these results with your outpatient follow-up provider(s). SYMPTOMS to watch for: chest pain, shortness of breath, fever, chills, nausea, vomiting, diarrhea, change in mentation, falling, weakness, bleeding. DIET/what to eat:  Cardiac Diet    ACTIVITY:  Activity as tolerated    What to do if new or unexpected symptoms occur? If you experience any of the above symptoms (or should other concerns or questions arise after discharge) please call your primary care physician. Return to the emergency room if you cannot get hold of your doctor. It is very important that you keep your follow-up appointment(s). Please bring discharge papers, medication list (and/or medication bottles) to your follow-up appointments for review by your outpatient provider(s). Please check the list of medications and be sure it includes every medication (even non-prescription medications) that your provider wants you to take. It is important that you take the medication exactly as they are prescribed. Keep your medication in the bottles provided by the pharmacist and keep a list of the medication names, dosages, and times to be taken in your wallet. Do not take other medications without consulting your doctor.    If you have any questions about your medications or other instructions, please talk to your nurse or care provider before you leave the hospital.    I understand that if any problems occur once I am at home I am to contact my physician. These instructions were explained to me and I had the opportunity to ask questions. Transient Ischemic Attack: Care Instructions  Overview     A transient ischemic attack (TIA) is when blood flow to a part of your brain is blocked for a short time. A TIA causes stroke symptoms that can last for at least a few minutes. Stroke symptoms include sudden weakness or loss of movement in a part of your body, confusion, vision changes, trouble speaking, and trouble walking or balancing. But unlike a stroke, a TIA doesn't cause lasting brain damage. TIAs are often warning signs of a stroke. Some people who have a TIA may have a stroke in the future. If you have symptoms of a stroke, call for emergency help right away. Quick treatment can help limit damage to the brain and increase the chance of recovery. You can take steps to help prevent a stroke. These steps include managing health problems that raise your risk, taking medicine that prevents blood clots, and having a heart-healthy lifestyle. This lifestyle includes being active, eating healthy foods, staying at a healthy weight, and not smoking. Follow-up care is a key part of your treatment and safety. Be sure to make and go to all appointments, and call your doctor if you are having problems. It's also a good idea to know your test results and keep a list of the medicines you take. How can you care for yourself at home? Medicines    Be safe with medicines. Take your medicines exactly as prescribed. Call your doctor if you think you are having a problem with your medicine. If you take a blood thinner, such as aspirin, be sure you get instructions about how to take your medicine safely.  Blood thinners can cause serious bleeding problems. Call your doctor if you are not able to take your medicines for any reason. Do not take any over-the-counter medicines or herbal products without talking to your doctor first.     If you use hormonal birth control or hormone therapy, talk to your doctor. Ask if these are right for you. They may raise the risk of stroke in some people. Heart-healthy lifestyle    Do not smoke. If you need help quitting, talk to your doctor about stop-smoking programs and medicines. Be active. If your doctor recommends it, get more exercise. Walking is a good choice. Bit by bit, increase the amount you walk every day. Try for at least 30 minutes on most days of the week. You also may want to swim, bike, or do other activities. Eat heart-healthy foods. These include vegetables, fruits, nuts, beans, lean meat, fish, and whole grains. Limit sodium and sugar. Stay at a healthy weight. Lose weight if you need to. Limit alcohol to 2 drinks a day for men and 1 drink a day for women. Staying healthy    Manage other health problems that raise your risk of stroke. These include atrial fibrillation, diabetes, high blood pressure, and high cholesterol. If you think you may have a problem with alcohol or drug use, talk to your doctor. Get the flu vaccine every year. When should you call for help? Call 911 anytime you think you may need emergency care. For example, call if:    You have symptoms of a stroke. These may include:  Sudden numbness, tingling, weakness, or loss of movement in your face, arm, or leg, especially on only one side of your body. Sudden vision changes. Sudden trouble speaking. Sudden confusion or trouble understanding simple statements. Sudden problems with walking or balance. A sudden, severe headache that is different from past headaches. Call 911 even if these symptoms go away in a few minutes. You feel like you are having another TIA.    Watch closely for changes in your health, and be sure to contact your doctor if you have any problems. Where can you learn more? Go to http://www.[employer].com/  Enter I231 in the search box to learn more about \"Transient Ischemic Attack: Care Instructions. \"  Current as of: March 28, 2022               Content Version: 13.4  © 3176-8438 [employer]. Care instructions adapted under license by [employer] (which disclaims liability or warranty for this information). If you have questions about a medical condition or this instruction, always ask your healthcare professional. [address] any warranty or liability for your use of this information.

## 2023-02-01 NOTE — PROGRESS NOTES
Occupational Therapy Contact Note  02/01/23    Orders received and acknowledged. Chart reviewed and spoke with RN, patient is currently receiving ultrasound at bedside and unavailable to participate in OT eval at this time. Will follow up with patient to complete OT eval as able.     Thank you for this referral,  PARIS Gill, OTR/L

## 2023-02-01 NOTE — PROGRESS NOTES
Transition of Care Plan  RUR- Low  7%  DISPOSITION: Home with spouse when stable, therapy evals pending  F/U with PCP/Specialist    Transport: Spouse    Reason for Admission:  stroke rule out                     RUR Score:          7%           Plan for utilizing home health:      Hx of home health with [facility], therapy evals are pending    PCP: First and Last name:  Shannon Ferrera MD     Name of Practice:    Are you a current patient: Yes/No: Yes   Approximate date of last visit: Oct 2022   Can you participate in a virtual visit with your PCP:                     Current Advanced Directive/Advance Care Plan: Full Code      Healthcare Decision Maker: SpouseVinod   Click here to complete [address] including selection of the Healthcare Decision Maker Relationship (ie \"Primary\")                             Transition of Care Plan:                      CM met with patient at bedside to introduce self and role. Living situation: lives with spouse in 1 story home, 0 steps to enter, 3 steps from living room to kitchen  ADLs: independent  DME: none  Previous IPR/SNF:none  Previous home health: [facility], has also done OP  Demographics: confirmed, Medicare A and [address]: [address] point of contact: SpouseVinod [phone]    CM to follow patient progress and assist as recommended with REHANA plan. Care Management Interventions  PCP Verified by CM: Yes  Palliative Care Criteria Met (RRAT>21 & CHF Dx)?: No  Mode of Transport at Discharge:  Other (see comment) (spouse)  Transition of Care Consult (CM Consult): Discharge Planning  MyChart Signup: Yes  Discharge Durable Medical Equipment: No  Health Maintenance Reviewed: Yes  Physical Therapy Consult: Yes  Occupational Therapy Consult: Yes  Speech Therapy Consult: Yes  Support Systems: Spouse/Significant Other, Child(jocelin), Other Family Member(s)  Confirm Follow Up Transport: Family  The Plan for Transition of Care is Related to the Following Treatment Goals : home  Name of the Patient Representative Who was Provided with a Choice of Provider and Agrees with the Discharge Plan: patient  Discharge Location  Patient Expects to be Discharged to[de-identified] Home    Medicare pt has received, reviewed, and signed 2nd IM letter informing them of their right to appeal the discharge. Signed copy has been placed on pt bedside chart. MINESH Malcolm.

## 2023-02-01 NOTE — PROGRESS NOTES
1930 Bedside and Verbal shift change report given to Izabel (oncoming nurse) by Maciej Harris (offgoing nurse). Report included the following information SBAR, Kardex, ED Summary, and MAR.      0730 Bedside and Verbal shift change report given to Maciej Harris (oncoming nurse) by Erla Hodgkin (offgoing nurse). Report included the following information SBAR, Kardex, ED Summary, and MAR. Problem: Falls - Risk of  Goal: *Absence of Falls  Description: Document Clearence Skates Fall Risk and appropriate interventions in the flowsheet.   Outcome: Progressing Towards Goal  Note: Fall Risk Interventions:     Problem: TIA/CVA Stroke: 0-24 hours  Goal: *Ability to perform ADLs and demonstrates progressive mobility and function  Outcome: Progressing Towards Goal

## 2023-02-01 NOTE — PROGRESS NOTES
Bedside and Verbal shift change report given to [address] [facility] [address] (oncoming nurse) by Myles Mckeon RN (offgoing nurse). Report included the following information SBAR, Kardex, Intake/Output, MAR, Recent Results, and Med Rec Status.

## 2023-02-01 NOTE — PROGRESS NOTES
Orders received, chart reviewed and patient evaluated by physical therapy. Pending progression with skilled acute physical therapy:  No skilled physical therapy/ follow up rehabilitation needs identified at this time. Signing off. Recommend with nursing OOB to chair 3x/day and walking daily with supervision assist. Thank you for completing as able in order to maintain patient strength, endurance and independence. Full evaluation to follow.       Thank you for your consideration,    Maegan Davies, PT, DPT

## 2023-02-01 NOTE — PROGRESS NOTES
OCCUPATIONAL THERAPY EVALUATION/DISCHARGE  Patient: Albert Brown (23 y.o. female)  Date: 2/1/2023  Primary Diagnosis: TIA (transient ischemic attack) [G45.9]       Precautions:        ASSESSMENT  Based on the objective data described below, the patient presents with near baseline functional performance for ADLs, transfers, and functional mobility today. Patient admitted after brief ~1 minute episode of L eye vision loss, completely resolved at this time and at time of admission. Vision, attention, and coordination WNL. Patient noted to have SOB with gait assessment with PT in hallway though contributes this to CHF for which she is scheduled for valve replacement 2/7. No further acute OT needs identified at this time; please re-consult if change in status or further needs arise. Recommending home with family assistance when medically ready. Current Level of Function (ADLs/self-care): Mod I-Independent all ADLs, mobility, and transfers    Functional Outcome Measure: The patient scored 100/100 on the Barthel Index outcome measure    Other factors to consider for discharge: none     PLAN :  Recommend with staff: OOB to recliner as tolerated. OOB to BSC/bathroom with assist x1 for safety     Recommendation for discharge: (in order for the patient to meet his/her long term goals)  No skilled occupational therapy/ follow up rehabilitation needs identified at this time. This discharge recommendation:  A follow-up discussion with the attending provider and/or case management is planned    IF patient discharges home will need the following DME: patient owns DME required for discharge       SUBJECTIVE:   Patient stated I'm scheduled for surgery next Tuesday, the valve replacement.     OBJECTIVE DATA SUMMARY:   HISTORY:   Past Medical History:   Diagnosis Date    Aortic stenosis 5/4/2010    Arthritis     Asthma 5/4/2010    Congestive heart failure (HCC)     heart disease    Elevated cholesterol 5/4/2010    elevated particle number    Ex-smoker 5/4/2010    Family history of diabetes mellitus (DM) 5/4/2010    Family history of early CAD 5/4/2010    GERD (gastroesophageal reflux disease)     Ill-defined condition     ALLERGIC TO MOLD AND MILDEW    Osteopenia 5/4/2010    Uterine fibroid 5/4/2010    Vitamin D deficiency 5/10/2010     Past Surgical History:   Procedure Laterality Date    ENDOSCOPY, COLON, DIAGNOSTIC  6/04    due 6/14    HX BREAST BIOPSY  6/06    BENIGN    HX GI      COLONOSCOPY    HX GYN  10/01    thermal ablation    MD BREAST SURGERY PROCEDURE UNLISTED  1978    clogged milk duct    MD CARDIAC SURG PROCEDURE UNLIST  2014    CARDIAC CATH       Prior Level of Function/Environment/Context:   Expanded or extensive additional review of patient history:   Home Situation  Home Environment: Private residence  # Steps to Enter: 0  One/Two Story Residence: One story (3 steps from kitchen to rest of house)  Living Alone: No (lives with )  Support Systems: Spouse/Significant Other, Child(jocelin), Other Family Member(s)  Patient Expects to be Discharged to[de-identified] Home  Current DME Used/Available at Home: Shower chair  Tub or Shower Type: Shower    Hand dominance: Right    EXAMINATION OF PERFORMANCE DEFICITS:  Cognitive/Behavioral Status:  Neurologic State: Alert  Orientation Level: Oriented X4  Cognition: Appropriate for age attention/concentration; Appropriate safety awareness  Perception: Appears intact  Perseveration: No perseveration noted  Safety/Judgement: Awareness of environment;Good awareness of safety precautions; Insight into deficits    Hearing: Auditory  Auditory Impairment: None    Vision/Perceptual:    Tracking: Able to track stimulus in all quadrants w/o difficulty                      Acuity: Within Defined Limits;Able to read clock/calendar on wall without difficulty; Able to read employee name badge without difficulty    Corrective Lenses: Glasses    Range of Motion:  AROM: Within functional limits  PROM: Within functional limits                      Strength:  Strength: Within functional limits                Coordination:  Coordination: Within functional limits  Fine Motor Skills-Upper: Left Intact; Right Intact    Gross Motor Skills-Upper: Left Intact; Right Intact    Tone & Sensation:  Tone: Normal  Sensation: Intact                      Balance:  Sitting: Intact  Standing: Intact    Functional Mobility and Transfers for ADLs:  Bed Mobility:  Supine to Sit: Modified independent  Scooting: Modified independent    Transfers:  Sit to Stand: Independent  Stand to Sit: Modified independent  Bed to Chair: Modified independent    ADL Assessment:  Feeding: Independent    Oral Facial Hygiene/Grooming: Independent    Bathing: Modified independent         Upper Body Dressing: Modified independent    Lower Body Dressing: Modified independent    Toileting: Modified independent                ADL Intervention and task modifications:  Feeding  Feeding Assistance: Independent  Container Management: Independent  Cutting Food: Independent  Utensil Management: Independent  Food to Mouth: Independent  Drink to Mouth: Independent                             Lower Body Dressing Assistance  Socks: Modified independent  Leg Crossed Method Used: Yes  Position Performed: Seated edge of bed         Cognitive Retraining  Safety/Judgement: Awareness of environment;Good awareness of safety precautions; Insight into deficits    Functional Measure:    Barthel Index:  Bathin  Bladder: 10  Bowels: 10  Groomin  Dressing: 10  Feeding: 10  Mobility: 15  Stairs: 10  Toilet Use: 10  Transfer (Bed to Chair and Back): 15  Total: 100/100      The Barthel ADL Index: Guidelines  1. The index should be used as a record of what a patient does, not as a record of what a patient could do. 2. The main aim is to establish degree of independence from any help, physical or verbal, however minor and for whatever reason.   3. The need for supervision renders the patient not independent. 4. A patient's performance should be established using the best available evidence. Asking the patient, friends/relatives and nurses are the usual sources, but direct observation and common sense are also important. However direct testing is not needed. 5. Usually the patient's performance over the preceding 24-48 hours is important, but occasionally longer periods will be relevant. 6. Middle categories imply that the patient supplies over 50 per cent of the effort. 7. Use of aids to be independent is allowed. Score Interpretation (from [facility] [address])    Independent   60-79 Minimally independent   40-59 Partially dependent   20-39 Very dependent   <20 Totally dependent     -Chaz Diaz., Barthel, D.W. (1965). Functional evaluation: the Barthel Index. [facility] ([address] [place] [address] (1997). The Barthel activities of daily living index: self-reporting versus actual performance in the old (> or = 75 years). Journal of [address] [place] [address] 45(7), [address] [facility], ALFONSO, Juan Alberto Warner., [place]. (1999). Measuring the change in disability after inpatient rehabilitation; comparison of the responsiveness of the Barthel Index and Functional Brave Measure. Journal of Neurology, Neurosurgery, and Psychiatry, 66(4), 657-640. Darius Winter, N.J.A, CINDY Mead, & Miguel Muniz, M.A. (2004) Assessment of post-stroke quality of life in cost-effectiveness studies: The usefulness of the Barthel Index and the EuroQoL-5D.  Quality of Life Research, 15, 644-50         Occupational Therapy Evaluation Charge Determination   History Examination Decision-Making   LOW Complexity : Brief history review  LOW Complexity : 1-3 performance deficits relating to physical, cognitive , or psychosocial skils that result in activity limitations and / or participation restrictions  LOW Complexity : No comorbidities that affect functional and no verbal or physical assistance needed to complete eval tasks       Based on the above components, the patient evaluation is determined to be of the following complexity level: LOW   Pain Rating:  No complaints    Activity Tolerance:   Fair, tolerates ADLs without rest breaks, SpO2 stable on RA, requires rest breaks, and observed SOB with activity    After treatment patient left in no apparent distress:    Sitting in chair, Call bell within reach, and Caregiver / family present    COMMUNICATION/EDUCATION:   The patients plan of care was discussed with: Physical therapist, Registered nurse, and Case management.      Thank you for this referral.  Bandar Bowers OT  Time Calculation: 22 mins

## 2023-02-01 NOTE — CONSULTS
[address]    Name:  Codi Dyer  MR#:  [id number]  :  1950  ACCOUNT #:  [de-identified]  DATE OF SERVICE:  2023    REQUESTING PHYSICIAN:  Blake Dominique NP.    REASON FOR EVALUATION:  Transient vision loss. HISTORY OF PRESENT ILLNESS:  The patient is a 74-year-old female with a history of coronary artery disease, status post pacemaker implantation, severe aortic stenosis, who is planned for surgical repair later this week, she has a history of bicuspid aortic valve and is status post bioprosthetic aortic valve replacement in 2014, and arthritis. Earlier today, she was sitting down and working on her computer, talking to someone on the phone when she suddenly noticed that she lost vision in her left eye. Everything appeared white and fuzzy. She tried to rub it off, but it did not clear. It eventually started to clear up in about a minute, and then it was completely back to normal.  Did not have any associated headache or any other focal motor sensory deficits. She discussed this with the Dr. Paul Zapata, who plans to do surgery later this week, and she was advised to go to the hospital for further evaluation. Never had any similar symptoms in the past.  No chest pain, shortness of breath, palpitations, nausea, or vomiting. PAST MEDICAL HISTORY:  As mentioned above. HOME MEDICATIONS:  1. Aspirin 81 mg daily. 2.  Omeprazole. 3.  Ditropan. 4.  Losartan. 5.  Multivitamins. 6.  Singulair. 7.  Atorvastatin. ALLERGIES:  MORPHINE. SOCIAL HISTORY:  Quit smoking about 30 years ago. Occasionally, will have an alcoholic drink. FAMILY HISTORY:  Positive for hypertension, coronary artery disease, dyslipidemia in mother; coronary artery disease, diabetes, and dyslipidemia and hypertension in father. PHYSICAL EXAMINATION:  GENERAL:  The patient is alert, fully oriented. VITAL SIGNS:  Blood pressure 131/73, temperature 98.3, pulse is 94.   CHEST: Clear.  HEART:  Regular rate and rhythm. ABDOMEN:  Soft, nontender. Positive bowel sounds. EXTREMITIES:  No edema. NEUROLOGIC:  Speech is clear. Comprehension is normal.  Pupils are equal, round, and reactive. Extraocular movements are full. Face is symmetric. Tongue is midline. Hearing is baseline. Muscle tone and bulk normal.  Strength normal in both upper and lower extremities. DTRs 2/2 and symmetric. Toes downgoing. Sensation intact. Gait baseline. LABORATORY DATA:  CBC is unremarkable with a platelet count of 402. Chemistries:  Sodium 134, potassium 3.8, BUN 10, creatinine 0.48. LFTs unremarkable. CTA of the head and neck; preliminary, did not show any large vessel occlusion. Ascending aorta measures 4.4 cm. No recent echocardiogram on file. ASSESSMENT AND PLAN:  A 72-year-old female with aortic stenosis, status post bioprosthetic aortic valve replacement in 07/2014, congestive heart failure, who is planned for surgical repair for aortic stenosis later this week. She had a brief 1-minute episode of losing her vision completely in the left eye. There were no other neurological symptoms. I suspect that this was possibly amaurosis fugax, but the duration was very brief and atypical.  CTA preliminary is negative. She is unable to get an MRI. We will also check an echocardiogram given her cardiac history, currently on aspirin and statin which she should continue. If the workup is negative, okay to discharge, and she may proceed with aortic surgery as planned. Thank you for this consultation.       Hawa Lang MD      [id number]  D:  01/31/2023 18:04  T:  02/01/2023 6:34  JOB #:  [id number]

## 2023-02-01 NOTE — PROGRESS NOTES
SPEECH PATHOLOGY BEDSIDE SWALLOW EVALUATION/DISCHARGE  Patient: Brad Lopez (4 y.o. female)  Date: 2/1/2023  Primary Diagnosis: TIA (transient ischemic attack) [G45.9]       Precautions: NA       ASSESSMENT :  Patient presents with functional oropharyngeal swallow. Timely and efficient oral phase of swallow; no overt s/s of aspiration with any trialed consistency. Patient denies change in motor speech, language, voice, or cognition at present time. Recommend patient continue regular texture diet with thin liquids with general swallow precautions. Skilled acute therapy provided by a speech-language pathologist is not indicated at this time. PLAN :  Recommendations:  Regular texture diet with thin liquids  Oral medications as tolerated  Discharge Recommendations: None from SLP perspective     SUBJECTIVE:   Patient stated I am doing just fine. \"     OBJECTIVE:     Past Medical History:   Diagnosis Date    Aortic stenosis 5/4/2010    Arthritis     Asthma 5/4/2010    Congestive heart failure ([address]     heart disease    Elevated cholesterol 5/4/2010    elevated particle number    Ex-smoker 5/4/2010    Family history of diabetes mellitus (DM) 5/4/2010    Family history of early CAD 5/4/2010    GERD (gastroesophageal reflux disease)     Ill-defined condition     ALLERGIC TO MOLD AND MILDEW    Osteopenia 5/4/2010    Uterine fibroid 5/4/2010    Vitamin D deficiency 5/10/2010     Past Surgical History:   Procedure Laterality Date    ENDOSCOPY, COLON, DIAGNOSTIC  6/04    due 6/14    HX BREAST BIOPSY  6/06    BENIGN    HX GI      COLONOSCOPY    HX GYN  10/01    thermal ablation    MA BREAST SURGERY PROCEDURE UNLISTED  1978    clogged milk duct    MA CARDIAC SURG PROCEDURE UNLIST  2014    CARDIAC CATH      Diet prior to admission: Regular/thin  Current Diet: Regular/thin    Cognitive and Communication Status:  Neurologic State: Alert  Orientation Level: Oriented X4  Cognition: Appropriate for age attention/concentration, Follows commands  Perception: Appears intact  Perseveration: No perseveration noted  Safety/Judgement: Awareness of environment, Insight into deficits    Oral Assessment:  Oral Assessment  Labial: No impairment  Dentition: Natural;Full  Oral Hygiene: Moist oral mucosa  Lingual: No impairment  Velum: No impairment  Mandible: No impairment    P.O. Trials:  Patient Position: Upright in chair  Vocal quality prior to P.O.: No impairment  Consistency Presented: Thin liquid; Solid;Puree  How Presented: Self-fed/presented;Cup/sip;Spoon  Bolus Acceptance: No impairment  Bolus Formation/Control: No impairment  Propulsion: No impairment  Oral Residue: None  Initiation of Swallow: No impairment  Laryngeal Elevation: Functional  Aspiration Signs/Symptoms: None  Pharyngeal Phase Characteristics: No impairment, issues, or problems          NOMS:   The NOMS functional outcome measure was used to quantify this patient's level of swallowing impairment. Based on the NOMS, the patient was determined to be at level 7 for swallow function:   NOMS Swallowing Levels:  Level 1 (CN): NPO  Level 2 (CM): NPO but takes consistency in therapy  Level 3 (CL): Takes less than 50% of nutrition p.o. and continues with nonoral feedings; and/or safe with mod cues; and/or max diet restriction  Level 4 (CK): Safe swallow but needs mod cues; and/or mod diet restriction; and/or still requires some nonoral feeding/supplements  Level 5 (CJ): Safe swallow with min diet restriction; and/or needs min cues  Level 6 (CI): Independent with p.o.; rare cues; usually self cues; may need to avoid some foods or needs extra time  Level 7 ([address] [place] [address]): Independent for all p.o.  NURIS. (2003). National Outcomes Measurement System (NOMS): Adult Speech-Language Pathology User's Guide.      After treatment:   Patient left in no apparent distress in bed and Call bell within reach    COMMUNICATION/EDUCATION:   The patient's plan of care including recommendations, planned interventions, and recommended diet changes were discussed with: patient who verbalized understanding.      Thank you for this referral.  Sindhu Mejía SLP  Time Calculation: 25 mins

## 2023-02-01 NOTE — PROGRESS NOTES
Problem: Falls - Risk of  Goal: *Absence of Falls  Description: Document Dayron Rivers Fall Risk and appropriate interventions in the flowsheet.   Outcome: Progressing Towards Goal  Note: Fall Risk Interventions:                                Problem: Patient Education: Go to Patient Education Activity  Goal: Patient/Family Education  Outcome: Progressing Towards Goal     Problem: Patient Education: Go to Patient Education Activity  Goal: Patient/Family Education  Outcome: Progressing Towards Goal     Problem: TIA/CVA Stroke: 0-24 hours  Goal: Off Pathway (Use only if patient is Off Pathway)  Outcome: Progressing Towards Goal  Goal: Activity/Safety  Outcome: Progressing Towards Goal  Goal: Consults, if ordered  Outcome: Progressing Towards Goal  Goal: Diagnostic Test/Procedures  Outcome: Progressing Towards Goal  Goal: Nutrition/Diet  Outcome: Progressing Towards Goal  Goal: Discharge Planning  Outcome: Progressing Towards Goal  Goal: Medications  Outcome: Progressing Towards Goal  Goal: Respiratory  Outcome: Progressing Towards Goal  Goal: Treatments/Interventions/Procedures  Outcome: Progressing Towards Goal  Goal: Minimize risk of bleeding post-thrombolytic infusion  Outcome: Progressing Towards Goal  Goal: Monitor for complications post-thrombolytic infusion  Outcome: Progressing Towards Goal  Goal: Psychosocial  Outcome: Progressing Towards Goal  Goal: *Hemodynamically stable  Outcome: Progressing Towards Goal  Goal: *Neurologically stable  Description: Absence of additional neurological deficits    Outcome: Progressing Towards Goal  Goal: *Verbalizes anxiety and depression are reduced or absent  Outcome: Progressing Towards Goal  Goal: *Absence of Signs of Aspiration on Current Diet  Outcome: Progressing Towards Goal  Goal: *Absence of deep venous thrombosis signs and symptoms(Stroke Metric)  Outcome: Progressing Towards Goal  Goal: *Ability to perform ADLs and demonstrates progressive mobility and function  Outcome: Progressing Towards Goal  Goal: *Stroke education started(Stroke Metric)  Outcome: Progressing Towards Goal  Goal: *Dysphagia screen performed(Stroke Metric)  Outcome: Progressing Towards Goal  Goal: *Rehab consulted(Stroke Metric)  Outcome: Progressing Towards Goal     Problem: Ischemic Stroke: Discharge Outcomes  Goal: *Verbalizes anxiety and depression are reduced or absent  Outcome: Progressing Towards Goal  Goal: *Verbalize understanding of risk factor modification(Stroke Metric)  Outcome: Progressing Towards Goal  Goal: *Hemodynamically stable  Outcome: Progressing Towards Goal  Goal: *Absence of aspiration pneumonia  Outcome: Progressing Towards Goal  Goal: *Aware of needed dietary changes  Outcome: Progressing Towards Goal  Goal: *Verbalize understanding of prescribed medications including anti-coagulants, anti-lipid, and/or anti-platelets(Stroke Metric)  Outcome: Progressing Towards Goal  Goal: *Tolerating diet  Outcome: Progressing Towards Goal  Goal: *Aware of follow-up diagnostics related to anticoagulants  Outcome: Progressing Towards Goal  Goal: *Ability to perform ADLs and demonstrates progressive mobility and function  Outcome: Progressing Towards Goal  Goal: *Absence of DVT(Stroke Metric)  Outcome: Progressing Towards Goal  Goal: *Absence of aspiration  Outcome: Progressing Towards Goal  Goal: *Optimal pain control at patient's stated goal  Outcome: Progressing Towards Goal  Goal: *Home safety concerns addressed  Outcome: Progressing Towards Goal  Goal: *Describes available resources and support systems  Outcome: Progressing Towards Goal  Goal: *Verbalizes understanding of activation of EMS(911) for stroke symptoms(Stroke Metric)  Outcome: Progressing Towards Goal  Goal: *Understands and describes signs and symptoms to report to providers(Stroke Metric)  Outcome: Progressing Towards Goal  Goal: *Neurolgocially stable (absence of additional neurological deficits)  Outcome: Progressing Towards Goal  Goal: *Verbalizes importance of follow-up with primary care physician(Stroke Metric)  Outcome: Progressing Towards Goal  Goal: *Smoking cessation discussed,if applicable(Stroke Metric)  Outcome: Progressing Towards Goal  Goal: *Depression screening completed(Stroke Metric)  Outcome: Progressing Towards Goal     Problem: Discharge Planning  Goal: *Discharge to safe environment  Outcome: Progressing Towards Goal

## 2023-02-01 NOTE — DISCHARGE SUMMARY
Discharge Summary     Patient:  Emma Davenport       MRN: [id number]       YOB: 1950       Age: 81 y.o. Date of admission:  1/31/2023    Date of discharge:  2/1/2023    Primary care provider: Dr. Nichole Plata MD    Admitting provider:  Re Cardenas MD    Discharging provider:  Tiffani Parks 91.: [phone]. If unavailable, call [phone] and ask the  to page the triage hospitalist.    Consultations  IP CONSULT TO HOSPITALIST  IP CONSULT TO NEUROLOGY    Procedures  * No surgery found *    Discharge destination: Home. The patient is stable for discharge. Admission diagnosis  TIA (transient ischemic attack) [G45.9]    Current Discharge Medication List        CONTINUE these medications which have NOT CHANGED    Details   aspirin 81 mg chewable tablet Take 81 mg by mouth daily. calcium-vits A9-Z-G7-minerals 166.75 mg- 166.75 unit cap calcium      TURMERIC PO turmeric      omeprazole (PRILOSEC) 40 mg capsule       oxybutynin chloride XL (DITROPAN XL) 10 mg CR tablet       losartan (COZAAR) 100 mg tablet Take 50 mg by mouth daily. montelukast (SINGULAIR) 10 mg tablet TAKE 1 TABLET BY MOUTH EVERY DAY  Qty: 90 Tab, Refills: 3      atorvastatin (LIPITOR) 40 mg tablet Take 1 Tab by mouth daily. Qty: 30 Tab, Refills: 11      MULTI-VITAMIN PO Take  by mouth. STOP taking these medications       amoxicillin (AMOXIL) 500 mg capsule Comments:   Reason for Stopping:         cholecalciferol (VITAMIN D3) 400 unit tab tablet Comments:   Reason for Stopping:                Follow-up Information    None         Final discharge diagnoses and brief hospital course  Emam Davenport is a 81 y.o. female with a PMH of CAD- pacemaker [employer], Severe Aortic Stenosis/ Bicuspid Aortic Valve s/p Bioprosthetic AVR in July 2014 (w/patch to repair aorta), HFrEF 35-40%, Hx Thoracic Aortic Aneursym, Arthritis, COPD/Asthma, Hx Tobacco Dependence, HLD, GERD and Dysphagia who was referred for cardiac evaluation by [facility] Dr. Michael Luo (primary cardiologist Dr. Norberto Carrera). ysm, she was referred to Cardiac Surgery for evaluation. Patient with c/o worsening SOB and TINEO with associated chest tightness x1year, worst last month before presentation. She denied palpitations, no dizziness however, she reported sudden loss of vision in her left eye that self resolved in 2-3 minutes. TIA:   - loss of vision of left eye. Code stroke: Estanislado Jefe negative, no TNK.  - CT head 1/31: No acute intracranial process identified.  - CTA Head/neck 1/31: Ascending aorta measures 4.4 cm. No large vessel occlusion, multinodular goiter. - ASA, Plavix  - appreciate Neurology input  - ECHO: stable EF 35-40%, bubble study neg   - carotid duplex: Consistent with minimal stenosis of the right internal carotid and minimal stenosis (low end) of the left internal carotid  - ST/PT/OT     CAD/Pacemaker ([employer])/Severe Aortic Stenosis/ Bicuspid Aortic Valve (s/p Bioprosthetic AVR in July 2014, w/patch to repair aorta), HFrEF 35-40%, Hx Thoracic Aortic Aneurysm/HLD/HTN:  - CardioThoracic: Plan for re-do bioprosthetic AVR on February 7th. HOLD losartan 48hrs before. - continue home statin, losartan     COPD/Asthma/Hx Tobacco Dependence:  - prn O2  - continue home Singulair      GERD/Dysphagia: SLP, protonix. Discharge recommendations:  PCP in 1 week  CTS as scheduled   Plan for TAVR revision next week on 2/7    Discharge plan explained to pt. She understood and agreed        Physical examination at discharge  Visit Vitals  BP (!) 144/67   Pulse 94   Temp 98.9 °F (37.2 °C)   Resp 19   Ht 5' (1.524 m)   Wt 76.4 kg (168 lb 6.9 oz)   SpO2 96%   BMI 32.89 kg/m²     Constitutional:  No acute distress, cooperative, pleasant    ENT:  Oral mucosa moist. EOMI, no vision. Resp:  CTA bilaterally. No wheezing/rhonchi/rales.  No accessory muscle use. CV:  Regular rhythm, normal rate, S1,S2.    GI/:  Soft, non distended, non tender, no guarding, BS present. Voids Freely. Musculoskeletal:  No edema, warm. Neurologic:  Moves all extremities. AAOx3. Skin:  w/d. Psych:  Not anxious nor agitated       Pertinent imaging studies:    Per EMR     Recent Labs     02/01/23 0239 01/31/23  1318   WBC 6.6 5.9   HGB 13.8 13.7   HCT 43.2 44.0   * 147*     Recent Labs     02/01/23  0239 01/31/23  1318    134*   K 3.8 3.8    103   CO2 24 28   BUN 11 10   CREA 0.49* 0.48*   * 86   CA 8.8 9.2   MG 2.0  --      Recent Labs     02/01/23 0239 01/31/23  1318   AP 78 81   TP 7.0 7.0   ALB 3.5 3.6   GLOB 3.5 3.4     Recent Labs     01/31/23  1318   INR 1.0   PTP 10.7      No results for input(s): FE, TIBC, PSAT, FERR in the last 72 hours. No results for input(s): PH, PCO2, PO2 in the last 72 hours. No results for input(s): CPK, CKMB in the last 72 hours.     No lab exists for component: TROPONINI  No components found for: [place]    Chronic Diagnoses:    Problem List as of 2/1/2023 Date Reviewed: 1/27/2022            Codes Class Noted - Resolved    TIA (transient ischemic attack) ICD-10-CM: G45.9  ICD-9-CM: 435.9  1/31/2023 - Present        Status post arthroscopy of shoulder ICD-10-CM: Z98.890  ICD-9-CM: V45.89  11/10/2021 - Present        Dislocation of left shoulder joint ICD-10-CM: S43.005A  ICD-9-CM: 831.00  10/20/2021 - Present        Tear of left rotator cuff ICD-10-CM: M75.102  ICD-9-CM: 840.4  10/20/2021 - Present        S/P AVR (aortic valve replacement) and aortoplasty ICD-10-CM: Z95.2  ICD-9-CM: V43.3  7/14/2014 - Present        Vitamin D deficiency ICD-10-CM: E55.9  ICD-9-CM: 268.9  5/10/2010 - Present        Ex-smoker ICD-10-CM: K71.142  ICD-9-CM: V15.82  5/4/2010 - Present    Overview Signed 5/4/2010  3:54 PM by Erika Mustache     Quit 2003             Family history of early CAD ICD-10-CM: Z82.49  ICD-9-CM: V17.3 5/4/2010 - Present        Family history of diabetes mellitus (DM) ICD-10-CM: Z83.3  ICD-9-CM: V18.0  5/4/2010 - Present        Asthma ICD-10-CM: J45.909  ICD-9-CM: 493.90  5/4/2010 - Present        Osteopenia ICD-10-CM: M85.80  ICD-9-CM: 733.90  5/4/2010 - Present    Overview Signed 5/4/2010  3:55 PM by Erika Painter     mild             Aortic stenosis ICD-10-CM: I35.0  ICD-9-CM: 424.1  5/4/2010 - Present    Overview Signed 5/4/2010  3:55 PM by Erika Painter     mild             Uterine fibroid ICD-10-CM: D25.9  ICD-9-CM: 218.9  5/4/2010 - Present        Elevated cholesterol ICD-10-CM: E78.00  ICD-9-CM: 272.0  5/4/2010 - Present    Overview Signed 5/4/2010  3:56 PM by Erika Painter     LDL goal 130             RESOLVED: S/P AVR (aortic valve replacement) ICD-10-CM: Z95.2  ICD-9-CM: V43.3  7/14/2014 - 7/14/2014    Overview Signed 7/14/2014  3:17 PM by Cris Cates NP     Tissue valve                Time spent on discharge related activities today greater than 30 minutes.       Signed:  Krystyna Contreras MD                 Hospitalist, Internal Medicine      Cc: Tammy Castillo MD

## 2023-02-01 NOTE — PROGRESS NOTES
I have reviewed discharge instructions with the patient and spouse. The patient and spouse verbalized understanding. Discharge medications reviewed with patient and spouse and appropriate educational materials and side effects teaching were provided. Patient's  is taking patient home.

## 2023-02-01 NOTE — PROGRESS NOTES
PHYSICAL THERAPY EVALUATION/DISCHARGE  Patient: Johanna Salmeron (26 y.o. female)  Date: 2/1/2023  Primary Diagnosis: TIA (transient ischemic attack) [G45.9]       Precautions: n/a         ASSESSMENT  Based on the objective data described below, the patient presents with little to no functional deficits as compared to functional baseline. This is a 81year old female who presented to ED with complaints of L of vision in L eye. CTA negative, no MRI due to pacemaker, echo pending. Symptoms have resolved and not returned. Patient with severe aortic stenosis, planned cardiac surgery on Tuesday of next week. Patient is independent with mobility today without AD. Scored very well on balance assessment, low risk for falls. Patient feels she is back to normal, equal MMT, coordination and visual testing. No further acute PT needs, no needs at discharge. Will sign off. Vitals:    02/01/23 0956 02/01/23 1000 02/01/23 1006 02/01/23 1016   BP: (!) 142/82  (!) 144/67 (!) 144/67   BP 1 Location: Left upper arm  Left upper arm    BP Patient Position: At rest;Semi fowlers  At rest;Sitting  Comment: post ambulation    Pulse: (!) 101 (!) 102 (!) 111 (!) 105   Temp:    98.9 °F (37.2 °C)   Resp:   (!) 33 19   Height:       Weight:       SpO2: 96%  96% 96%        Functional Outcome Measure: The patient scored 28/30 on the FGA outcome measure which is indicative of low risk for falls. Other factors to consider for discharge: high PLOF, strong family support     Further skilled acute physical therapy is not indicated at this time. PLAN :  Recommendation for discharge: (in order for the patient to meet his/her long term goals)  No skilled physical therapy/ follow up rehabilitation needs identified at this time.     This discharge recommendation:  Has been made in collaboration with the attending provider and/or case management    IF patient discharges home will need the following DME: none       SUBJECTIVE:   Patient stated I feel back to normal.    OBJECTIVE DATA SUMMARY:   HISTORY:    Past Medical History:   Diagnosis Date    Aortic stenosis 5/4/2010    Arthritis     Asthma 5/4/2010    Congestive heart failure ([address]     heart disease    Elevated cholesterol 5/4/2010    elevated particle number    Ex-smoker 5/4/2010    Family history of diabetes mellitus (DM) 5/4/2010    Family history of early CAD 5/4/2010    GERD (gastroesophageal reflux disease)     Ill-defined condition     ALLERGIC TO MOLD AND MILDEW    Osteopenia 5/4/2010    Uterine fibroid 5/4/2010    Vitamin D deficiency 5/10/2010     Past Surgical History:   Procedure Laterality Date    ENDOSCOPY, COLON, DIAGNOSTIC  6/04    due 6/14    HX BREAST BIOPSY  6/06    BENIGN    HX GI      COLONOSCOPY    HX GYN  10/01    thermal ablation    [place] BREAST SURGERY PROCEDURE UNLISTED  1978    clogged milk duct    [place] CARDIAC SURG PROCEDURE UNLIST  2014    CARDIAC CATH       Prior level of function: independent without AD, 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 [employer]   Personal factors and/or comorbidities impacting plan of care: high PLOF, cardiac history, surgery scheduled next Tuesday    Home Situation  Home Environment: Private residence  # Steps to Enter: 0  One/Two Story Residence: One story (3 steps from kitchen to rest of house)  Living Alone: No (lives with )  Support Systems: Spouse/Significant Other, Child(jocelin), Other Family Member(s)  Patient Expects to be Discharged to[de-identified] Home  Current DME Used/Available at Home: Shower chair  Tub or Shower Type: Shower    EXAMINATION/PRESENTATION/DECISION MAKING:   Critical Behavior:  Neurologic State: Alert  Orientation Level: Oriented X4  Cognition: Appropriate for age attention/concentration, Appropriate safety awareness  Safety/Judgement: Awareness of environment, Good awareness of safety precautions, Insight into deficits  Hearing:   Auditory  Auditory Impairment: None    Range Of Motion:  AROM: Within functional limits PROM: Within functional limits           Strength:    Strength: Within functional limits          Tone & Sensation:   Tone: Normal              Sensation: Intact               Coordination:  Coordination: Within functional limits  Vision:   Tracking: Able to track stimulus in all quadrants w/o difficulty  Acuity: Within Defined Limits;Able to read clock/calendar on wall without difficulty; Able to read employee name badge without difficulty  Corrective Lenses: Glasses  Functional Mobility:  Bed Mobility:     Supine to Sit: Modified independent     Scooting: Modified independent  Transfers:  Sit to Stand: Independent  Stand to Sit: Modified independent        Bed to Chair: Modified independent              Balance:   Sitting: Intact  Standing: Intact  Ambulation/Gait Training:  Distance (ft): 250 Feet (ft)     Ambulation - Level of Assistance: Independent        Gait Abnormalities: Decreased step clearance        Base of Support: Center of gravity altered  Stance: Weight shift  Speed/Lilo: Pace decreased (<100 feet/min)         Functional Measure:  Functional Gait Assessment:    Gait Level Surface: Normal  Change In Gait Speed: Normal  Gait With Horizontal Head Turns: Normal  Gait With Vertical Head Turns: Normal  Gait And Pivot Turn: Normal  Step Over Obstacle: Mild impairment  Gait With Narrow Base Of Support: Normal  Gait With Eyes Closed: Normal  Ambulating Backwards: Normal  Steps: Mild impairment  Score: 28/30     Maximum Score 30   £ 22/30 classifies fall risk in older adults and predicts unexplained falls in community-dwelling older adults  Age: \"Normal\" score:   Up to 60 >27/30   60-80 >24/30   Over 80 >19/30   ARLENE Juarez. \"Functional Gait Assessment: concurrent discriminative, and predictive validity in community-dwelling older adults. \" Physical Therapy 90 (3) 2010.            Physical Therapy Evaluation Charge Determination   History Examination Presentation Decision-Making   MEDIUM Complexity : 1-2 comorbidities / personal factors will impact the outcome/ POC  LOW Complexity : 1-2 Standardized tests and measures addressing body structure, function, activity limitation and / or participation in recreation  LOW Complexity : Stable, uncomplicated  LOW Complexity : FOTO score of       Based on the above components, the patient evaluation is determined to be of the following complexity level: LOW     Pain Rating:  No pain reported     Activity Tolerance:   WNL and Good      After treatment patient left in no apparent distress:   Sitting in chair, Call bell within reach, and Caregiver / family present    COMMUNICATION/EDUCATION:   The patients plan of care was discussed with: Occupational therapist, Registered nurse, and Case management. Fall prevention education was provided and the patient/caregiver indicated understanding.     Thank you for this referral.  Bennie Man, PT   Time Calculation: 22 mins

## 2023-02-02 DIAGNOSIS — I35.0 AORTIC VALVE STENOSIS, ETIOLOGY OF CARDIAC VALVE DISEASE UNSPECIFIED: Primary | ICD-10-CM

## 2023-02-02 LAB
ATRIAL RATE: 84 BPM
CALCULATED P AXIS, ECG09: 59 DEGREES
CALCULATED R AXIS, ECG10: -83 DEGREES
CALCULATED T AXIS, ECG11: 93 DEGREES
DIAGNOSIS, 93000: NORMAL
P-R INTERVAL, ECG05: 200 MS
Q-T INTERVAL, ECG07: 426 MS
QRS DURATION, ECG06: 160 MS
QTC CALCULATION (BEZET), ECG08: 503 MS
VENTRICULAR RATE, ECG03: 84 BPM

## 2023-02-02 RX ORDER — MUPIROCIN 20 MG/G
OINTMENT TOPICAL 2 TIMES DAILY
Qty: 15 G | Refills: 0 | Status: SHIPPED | OUTPATIENT
Start: 2023-02-05

## 2023-02-02 RX ORDER — CHLORHEXIDINE GLUCONATE 1.2 MG/ML
15 RINSE ORAL EVERY 12 HOURS
Qty: 118 ML | Refills: 0 | Status: SHIPPED | OUTPATIENT
Start: 2023-02-05

## 2023-02-06 ENCOUNTER — OFFICE VISIT (OUTPATIENT)
Dept: CARDIOLOGY CLINIC | Age: 73
End: 2023-02-06

## 2023-02-06 ENCOUNTER — HOSPITAL ENCOUNTER (OUTPATIENT)
Dept: PULMONOLOGY | Age: 73
Discharge: HOME | End: 2023-02-06
Attending: NURSE PRACTITIONER
Payer: COMMERCIAL

## 2023-02-06 ENCOUNTER — HOSPITAL ENCOUNTER (OUTPATIENT)
Dept: PREADMISSION TESTING | Age: 73
Discharge: HOME | End: 2023-02-06
Payer: MEDICARE

## 2023-02-06 ENCOUNTER — HOSPITAL ENCOUNTER (OUTPATIENT)
Dept: VASCULAR SURGERY | Age: 73
Discharge: HOME | End: 2023-02-06
Attending: NURSE PRACTITIONER
Payer: COMMERCIAL

## 2023-02-06 ENCOUNTER — APPOINTMENT (OUTPATIENT)
Dept: VASCULAR SURGERY | Age: 73
End: 2023-02-06
Attending: NURSE PRACTITIONER
Payer: COMMERCIAL

## 2023-02-06 VITALS
HEART RATE: 8 BPM | BODY MASS INDEX: 33.41 KG/M2 | WEIGHT: 170.19 LBS | SYSTOLIC BLOOD PRESSURE: 105 MMHG | TEMPERATURE: 97.6 F | HEIGHT: 60 IN | OXYGEN SATURATION: 94 % | DIASTOLIC BLOOD PRESSURE: 70 MMHG

## 2023-02-06 DIAGNOSIS — I35.0 AORTIC STENOSIS, SEVERE: Primary | ICD-10-CM

## 2023-02-06 DIAGNOSIS — I35.0 NONRHEUMATIC AORTIC VALVE STENOSIS: Primary | ICD-10-CM

## 2023-02-06 DIAGNOSIS — I35.0 NONRHEUMATIC AORTIC VALVE STENOSIS: ICD-10-CM

## 2023-02-06 LAB
APPEARANCE UR: CLEAR
ARTERIAL PATENCY WRIST A: POSITIVE
BACTERIA URNS QL MICRO: NEGATIVE /HPF
BASE EXCESS BLD CALC-SCNC: 0.4 MMOL/L
BDY SITE: ABNORMAL
BILIRUB UR QL: NEGATIVE
BNP SERPL-MCNC: 1362 PG/ML
COLOR UR: NORMAL
EPITH CASTS URNS QL MICRO: NORMAL /LPF
GAS FLOW.O2 O2 DELIVERY SYS: ABNORMAL
GLUCOSE UR STRIP.AUTO-MCNC: NEGATIVE MG/DL
HCO3 BLD-SCNC: 24 MMOL/L (ref 22–26)
HGB UR QL STRIP: NEGATIVE
HISTORY CHECKED?,CKHIST: NORMAL
HYALINE CASTS URNS QL MICRO: NORMAL /LPF (ref 0–5)
KETONES UR QL STRIP.AUTO: NEGATIVE MG/DL
LEFT ABI: 1.13
LEFT ARM BP: 119 MMHG
LEFT POSTERIOR TIBIAL: 133 MMHG
LEUKOCYTE ESTERASE UR QL STRIP.AUTO: NEGATIVE
NITRITE UR QL STRIP.AUTO: NEGATIVE
O2/TOTAL GAS SETTING VFR VENT: 21 %
PCO2 BLD: 35.1 MMHG (ref 35–45)
PH BLD: 7.44 (ref 7.35–7.45)
PH UR STRIP: 6.5 (ref 5–8)
PO2 BLD: 92 MMHG (ref 80–100)
PROT UR STRIP-MCNC: NEGATIVE MG/DL
RBC #/AREA URNS HPF: NORMAL /HPF (ref 0–5)
RIGHT ABI: 1.18
RIGHT ARM BP: 118 MMHG
RIGHT POSTERIOR TIBIAL: 131 MMHG
SAO2 % BLD: 97.5 % (ref 92–97)
SARS-COV-2 RNA RESP QL NAA+PROBE: NOT DETECTED
SARS-COV-2, COV2: NORMAL
SOURCE, COVRS: NORMAL
SP GR UR REFRACTOMETRY: 1.01 (ref 1–1.03)
SPECIMEN TYPE: ABNORMAL
TSH SERPL DL<=0.05 MIU/L-ACNC: 1.03 UIU/ML (ref 0.36–3.74)
UA: UC IF INDICATED,UAUC: NORMAL
UROBILINOGEN UR QL STRIP.AUTO: 0.2 EU/DL (ref 0.2–1)
VAS LEFT DORSALIS PEDIS BP: 135 MMHG
VAS RIGHT DORSALIS PEDIS BP: 140 MMHG
WBC URNS QL MICRO: NORMAL /HPF (ref 0–4)

## 2023-02-06 PROCEDURE — 93922 UPR/L XTREMITY ART 2 LEVELS: CPT

## 2023-02-06 PROCEDURE — 36415 COLL VENOUS BLD VENIPUNCTURE: CPT

## 2023-02-06 PROCEDURE — 82803 BLOOD GASES ANY COMBINATION: CPT

## 2023-02-06 PROCEDURE — 94010 BREATHING CAPACITY TEST: CPT

## 2023-02-06 PROCEDURE — 86923 COMPATIBILITY TEST ELECTRIC: CPT

## 2023-02-06 PROCEDURE — 83880 ASSAY OF NATRIURETIC PEPTIDE: CPT

## 2023-02-06 PROCEDURE — U0005 INFEC AGEN DETEC AMPLI PROBE: HCPCS

## 2023-02-06 PROCEDURE — 86900 BLOOD TYPING SEROLOGIC ABO: CPT

## 2023-02-06 PROCEDURE — 36600 WITHDRAWAL OF ARTERIAL BLOOD: CPT

## 2023-02-06 PROCEDURE — 84443 ASSAY THYROID STIM HORMONE: CPT

## 2023-02-06 PROCEDURE — 81001 URINALYSIS AUTO W/SCOPE: CPT

## 2023-02-06 RX ORDER — DM/PE/ACETAMINOPHEN/CHLORPHENR 10-5-325-2
1 TABLET, SEQUENTIAL ORAL DAILY
Status: ON HOLD | COMMUNITY

## 2023-02-06 NOTE — CONSENT
Informed Consent for Blood Component Transfusion Note    I have discussed with the patient the rationale for blood component transfusion; its benefits in treating or preventing fatigue, organ damage, or death; and its risk which includes mild transfusion reactions, rare risk of blood borne infection, or more serious but rare reactions. I have discussed the alternatives to transfusion, including the risk and consequences of not receiving transfusion. The patient had an opportunity to ask questions and had agreed to proceed with transfusion of blood components.     Electronically signed by Freddy Cuevas NP on 2/6/23 at 12:37 PM

## 2023-02-06 NOTE — H&P
H&P retrieved from previous office visit with updates as necessary    CSS   History and Physical    Subjective:      Merlyn Danielle is a 72 y.o. female who was referred for cardiac evaluation by Dr. Catarino Lopez (primary cardiologist Dr. Chhaya Olivas). She has a PMH of bicuspid aortic valve s/p bioprosthetic AVR in July 2014 (w/patch to repair aorta), severe aortic stenosis, [employer] pacemaker r/t CHB after AVR in 2014, HFrEF (EF 35-40%),  HTN, known thoracic aortic aneursym, HLD, asthma, COPD, hx smoker, and GERD/dysphagia. She has been followed by S with repeat ECHOs through the years, most recent in October showed severe stenosis with a reduced ejection fraction. She was initially evaluated for TAVR but due to her anatomy (coronary arteries are very low, difficult to complete TAVR without blocking off) and her known ascending aneurysm, she was referred to Cardiac Surgery for evaluation. Mrs. Darien Moritz states she has been experiencing TINEO with associated chest tightness for the past year but it has gotten worse in the past month. She denies any palpitations or dizziness. However, she states today she had an episode where she was sitting at her desk and experienced loss of vision in her left eye that lasted for approximately 2-3 minutes, then normalized. She lives at home with her  Lisette [place] and is independent of her ADLs. She still works in administration. She is COVID vaccinated (including boosters) but has not received her flu vaccine this year. She was a smoker but quit 20 years ago (smoked approximately 20 years), drinks 1x month, and denies recreational drug use. She has a family history significant for early CAD in her father and mother. Her brother also has a history of MI with stent placement. Update: She went to the ED after leaving the office as directed and was admitted overnight. CT head and CTA head/neck were negative for any acute findings (ascending aorta enlarged - known).  Carotid duplex with minimal stenosis. Neurology consulted, possibly amaurosis fugax, but duration was brief and atypical. They recommended she was safe to proceed with surgery as planned. Discharge summary recommends ASA and plavix (from my review, plavix not prescribed, would evaluate post-operatively). Cardiac Testing    Cardiac catheterization on 1/23/23:  Conclusion    Known bioprosthetic aortic valve stenosis, found to have mild CAD with aneurysmal ascending aorta. Aortic valve not crossed. Ascending aorta aneurysmal with 1+ aortic regurgitation. Mild coronary artery disease in a right dominant system. Ostium of right coronary artery is very close to bioprosthetic AVR sewing ring by angiography. 5Fr Right radial sheath removed via patent hemostasis. Continue evaluation for surgical AVR and root replacement. Coronary Findings    Diagnostic  Dominance: Right  Left Main   The vessel is moderate in size. There is mild disease. Left Anterior Descending   The vessel is moderate in size. There is mild disease. Left Circumflex   The vessel is moderate in size. There is mild disease. Right Coronary Artery   The vessel is moderate in size. There is mild disease. Intervention    No interventions have been documented. ECHO 2/1/23:  Study Details    Image quality: adequate. The view(s) performed were parasternal, apical, subcostal and suprasternal. Technically difficult study with poor endocardial visualization, procedure performed with the patient in a supine position, color flow Doppler was performed and pulse wave and/or continuous wave Doppler was performed. Definity contrast was given to enhance imaging. Interpretation Summary         Left Ventricle: Mildly reduced left ventricular systolic function with a visually estimated EF of 45 - 50%. Left ventricle size is normal. Mildly increased wall thickness. Mild global hypokinesis, that is more prominent in the septal wall, present.  Grade III diastolic dysfunction with increased LAP. Aortic Valve: Bioprosthetic valve. Mild regurgitation. Severe stenosis of the aortic valve. AV mean gradient is 45 mmHg. AV peak velocity is 4.0 m/s. LVOT:AV VTI Index is 0.25. LVOT diameter is 2.0 cm. AV area by continuity VTI is 0.7 cm2. Mitral Valve: Mildly thickened leaflet. Mild regurgitation. Tricuspid Valve: Normal RVSP. The estimated RVSP is 12 mmHg. Interatrial Septum: No PFO present visible by color Doppler. Non-diagnostic bubble study due to inadequate opacification of the right-sided cardiac chambers. Aorta: Mildly dilated sinus of Valsalva. Ao Sinus diameter is 4.2 to 4.3 cm. Technical qualifiers: Echo study was technically difficult with poor endocardial visualization. Contrast used: Definity.        Past Medical History:   Diagnosis Date    Adverse effect of anesthesia     MORPHINE - DIFFICULTY WAKING    Aneurysm ([facility] [address] 02/2023    AORTIC ANEURYSM    Aortic stenosis 05/04/2010    Arthritis     Asthma 05/04/2010    Congestive heart failure ([address]     heart disease    Elevated cholesterol 05/04/2010    elevated particle number    Ex-smoker 05/04/2010    Family history of diabetes mellitus (DM) 05/04/2010    Family history of early CAD 05/04/2010    GERD (gastroesophageal reflux disease)     Heart murmur     Hypertension     Ill-defined condition     ALLERGIC TO MOLD AND MILDEW    Osteopenia 05/04/2010    Stroke ([address] 01/31/2023    TIA    Uterine fibroid 05/04/2010    Vitamin D deficiency 05/10/2010     Past Surgical History:   Procedure Laterality Date    ENDOSCOPY, COLON, DIAGNOSTIC  06/01/2004    due 6/14    HX AORTIC VALVE REPLACEMENT  2014    HX BREAST BIOPSY  06/01/2006    BENIGN    HX GI      COLONOSCOPY    HX GYN  10/01/2001    thermal ablation    HX PACEMAKER  07/18/2014    HX ROTATOR CUFF REPAIR Left 2021    MA UNLISTED PROCEDURE BREAST  01/01/1978    clogged milk duct    MA UNLISTED PROCEDURE CARDIAC SURGERY  01/01/2014    CARDIAC CATH Social History     Tobacco Use    Smoking status: Former     Packs/day: 0.25     Years: 30.00     Pack years: 7.50     Types: Cigarettes     Quit date: 10/1/2002     Years since quittin.3    Smokeless tobacco: Never    Tobacco comments:     on and off for 30 years   Substance Use Topics    Alcohol use: Yes     Alcohol/week: 0.8 standard drinks     Types: 1 Glasses of wine per week     Comment: maybe 1 glass/week      Family History   Problem Relation Age of Onset    Alcohol abuse Mother     Heart Disease Mother     Hypertension Mother     Elevated Lipids Mother     Alcohol abuse Father     Heart Disease Father     Diabetes Father     Elevated Lipids Father     Hypertension Father     Heart Disease Brother 46        MI, PTCA/stent    Alcohol abuse Brother     Heart Disease Maternal Aunt     Heart Disease Maternal Aunt     Heart Disease Maternal Aunt     Heart Disease Maternal Aunt     Heart Disease Maternal Uncle     Cancer Paternal Aunt         uterine    Heart Disease Paternal Aunt     Cancer Paternal Uncle         lung    Cancer Daughter         skin    Alcohol abuse Son     Anesth Problems Neg Hx      Prior to Admission medications    Medication Sig Start Date End Date Taking? Authorizing Provider   glucosamine (Glucosamine Relief) 1,000 mg tab Take 1 Tablet by mouth daily. Provider, Historical   chlorhexidine (PERIDEX) 0.12 % solution 15 mL by Swish and Spit route every twelve (12) hours. 23   Lucian Cardenas NP   mupirocin (BACTROBAN) 2 % ointment by Both Nostrils route two (2) times a day. 23   Lucian Cardenas NP   aspirin 81 mg chewable tablet Take 81 mg by mouth daily. Provider, Historical   calcium-vits L6-H-L4-minerals 166.75 mg- 166.75 unit cap Take 1 Tablet by mouth daily. Provider, Historical   TURMERIC PO turmeric    Provider, Historical   omeprazole (PRILOSEC) 40 mg capsule Take 40 mg by mouth Daily (before breakfast).  21   Provider, Historical   oxybutynin chloride XL (DITROPAN XL) 10 mg CR tablet Take 10 mg by mouth nightly. 11/4/21   Provider, Historical   losartan (COZAAR) 100 mg tablet Take 50 mg by mouth nightly. Provider, Historical   montelukast (SINGULAIR) 10 mg tablet TAKE 1 TABLET BY MOUTH EVERY DAY  Patient taking differently: Take 10 mg by mouth nightly. 6/11/13   Abundio Altman MD   atorvastatin (LIPITOR) 40 mg tablet Take 1 Tab by mouth daily. Patient taking differently: Take 40 mg by mouth nightly. 9/24/12   Abundio Altman MD   MULTI-VITAMIN PO Take 1 Tablet by mouth daily. Provider, Historical       Allergies   Allergen Reactions    Morphine Other (comments)     Cold sweats & nightmares     Review of Systems: pertinent positives in HPI  Consititutional: Denies fever or chills. Eyes:  Denies use of glasses or vision problems(cataracts). ENT:  Denies hearing or swallowing difficulty. CV: Denies CP, claudication, HTN. Resp: Denies dyspnea, productive cough. : Denies dialysis or kidney problems. GI: Denies ulcers, esophageal strictures, liver problems. M/S: Denies joint or bone problems, or implanted artificial hardware. Skin: Denies varicose veins, edema. Neuro: Denies strokes, or TIAs. Psych: Denies anxiety or depression. Endocrine: Denies thyroid problems or diabetes. Heme/Lymphatic: Denies easy bruising or lymphedema. Objective:     VS: There were no vitals taken for this visit. Physical Exam:    General appearance: alert, cooperative, no distress  Head: normocephalic, without obvious abnormality; atraumatic  Eyes: conjunctivae/corneas clear; EOM's intact. Nose: nares normal; no drainage. Neck: no carotid bruit and no JVD  Lungs: clear to auscultation bilaterally  Heart: regular rate and rhythm; +III/VI murmur  Abdomen: soft, non-tender; bowel sounds normal  Extremities: moves all extremities; no weakness. Skin: Skin color normal; No varicose veins or edema.   Neurologic: Grossly normal      Labs: No results for input(s): WBC, HGB, HCT, PLT, NA, K, BUN, CREA, GLU, GLUCPOC, INR, HGBEXT, HCTEXT, PLTEXT, INREXT, HGBEXT, HCTEXT, PLTEXT, INREXT in the last 72 hours. No lab exists for component: [place]    Diagnostics:   PA and lateral 1/16/23:    Indication:  Abnormal result of cardiovascular function study, unspecified. Exam: PA and lateral views of the chest.     Direct comparison is made to prior CXR dated August 2014. Findings: Cardiomediastinal silhouette is within normal limits. Intact pacer  leads extending to the right atrium and right ventricle. Lungs are clear  bilaterally. Pleural spaces are normal. Osseous structures are intact. IMPRESSION  No acute cardiopulmonary disease. Carotid doppler 2/1/23:  Interpretation Summary    Consistent with minimal stenosis of the right internal carotid and minimal stenosis (low end) of the left internal carotid. Vertebrals are patent with antegrade flow. Cerebrovascular Findings    Right Carotid    The right CCA is patent. There is minimal stenosis in the right ICA. The right ICA has homogeneous plaque. The right ECA is patent. The right vertebral is antegrade. Left Carotid    The left CCA is patent. There is minimal stenosis in the left ICA and at the proximal segment . The left ICA has mixed density plaque. The left distal ICA is tortuous. The left ECA is patent. The left vertebral is antegrade. PFTS-FEV1:       EKG 1/31/23:    Assessment:     Active Problems:    * No active hospital problems. *      Plan:   Surgery is scheduled for February 7th 2023 with Dr. Nupur Saldaña for redo bioprosthetic AVR and repair of ascending aortic aneurysm. .    STS Risk Calculator V2.81 - based on isolated AVR, no option for AVR and ascending aortic aneurysm  Procedure: Isolated AVR  Risk of Mortality:  2.101%  Renal Failure:  0.656%  Permanent Stroke:  2.062%  Prolonged Ventilation:  9.053%  DSW Infection:  0.116%  Reoperation:  3.335%  Morbidity or Mortality:  12.824%  Short Length of Stay:  35.340%  Long Length of Stay:  5.764%      Treatment Plan:    Severe Aortic Stenosis: Hx bioprosthetic AVR in 2014, now with severe stenosis. Plan for re-do bioprosthetic AVR on February 7th. Continue 81mg ASA daily. Chlorhexidine and mupirocin ordered, started 2/5. Hx complete heart block r/t post op AVR: [employer] Harish pacemaker implanted in 2014 (with lead revision) patient states has 8 months of battery left on pacemaker. CHFrEF: EF 45% on 2/1/23 which is improved from previous ECHO results from [facility]. Will re-evaluate function post repair. HTN: PTA losartan 50mg daily - last dose 2/4    Aortic Aneursym: Known, last scan in our charts 2016 showed ascending aorta at 4cm, descending aorta 2.4cm. Dr. Skyler Clifton reviewed on most recent imaging, plans to repair in OR. Concern for TIA: Patient seen in ED, all testing discussed. Per Neurology is safe to proceed with surgery, they favor it possibly being amaurosis fugax. Per Discharge Summary from hospitalist, consider ASA and plavix at discharge. Dyslipidemia: PTA atorvastatin 40mg daily    Asthma: No inhalers noted, hx smoker. PFTs above, baseline ABG pending. GERD: PTA omeprazole 40mg daily.        Signed By: Rosey Salmeron NP     February 6, 2023

## 2023-02-06 NOTE — PERIOP NOTES
[facility]'S  CARDIAC SURGERY PREOPERATIVE INSTRUCTIONS    Surgery Date:   2-7-23    Your surgeon's office or [facility] staff will call you between 4 PM- 8 PM the day before surgery with your arrival time. If your surgery is on a Monday, you will receive a call the preceding Friday. Please report to [facility] Patient Access/Admitting on the 1st floor. Bring your insurance card, photo identification, and any copayment (if applicable). If you are going home the same day of your surgery, you must have a responsible adult to drive you home. You need to have a responsible adult to stay with you the first 24 hours after surgery and you should not drive a car for 24 hours following your surgery. Nothing to eat or drink after midnight the night before surgery. This includes no water, gum, mints, coffee, juice, etc.    Do NOT drink alcohol or smoke 24 hours before surgery. STOP smoking for 14 days prior as it helps with breathing and healing after surgery. If you are being admitted to the hospital, please leave personal belongings/luggage in your car until you have an assigned hospital room number. Please wear comfortable clothes. Wear your glasses instead of contacts. We ask that all money, jewelry and valuables be left at home. Wear no make up, particularly mascara, the day of surgery. All body piercings, rings, and jewelry need to be removed and left at home. Please remove any nail polish or artificial nails from your fingernails. Please wear your hair loose or down. Please no pony-tails, buns, or any metal hair accessories. If you shower the morning of surgery, please do not apply any lotions or powders afterwards. You may wear deodorant, unless having breast surgery. Do not shave any body area within 24 hours of your surgery. Please follow all instructions to avoid any potential surgical cancellation.   Should your physical condition change, (i.e. fever, cold, flu, etc.) please notify your surgeon as soon as possible. It is important to be on time. If a situation occurs where you may be delayed, please call:  [phone] / [phone] on the day of surgery. The Preadmission Testing staff can be reached at [phone]. Special instructions: REPORT TO [facility] AFTER PAT APPOINTMENT FOR ADDITIONAL TESTING    **Your surgeon will instruct you on which medications to continue/hold prior to surgery**      Incentive Spirometer        Using the incentive spirometer helps expand the small air sacs of your lungs, helps you breathe deeply, and helps improve your lung function. Use your incentive spirometer twice a day (10 breaths each time) prior to surgery. How to Use Your Incentive Spirometer:  Hold the incentive spirometer in an upright position. Breathe out as usual.   Place the mouthpiece in your mouth and seal your lips tightly around it. Take a deep breath. Breathe in slowly and as deeply as possible. Keep the blue flow rate guide between the arrows. Hold your breath as long as possible. Then exhale slowly and allow the piston to fall to the bottom of the column. Rest for a few seconds and repeat steps one through five at least 10 times. Preventing Infections Before and After - Your Surgery    IMPORTANT INSTRUCTIONS      You play an important role in your health and preparation for surgery. To reduce the germs on your skin you will need to shower with CHG soap (Chorhexidine gluconate 4%) two times before surgery. CHG soap (Hibiclens, Hex-A-Clens or store brand)  CHG soap will be provided at your Preadmission Testing (PAT) appointment. If you do not have a PAT appointment before surgery, you may arrange to  CHG soap from our office or purchase CHG soap at a pharmacy, grocery or department store. You need to purchase TWO 4 ounce bottles to use for your 2 showers.     Steps to follow:  Wash your hair with your normal shampoo and your body with regular soap and rinse well to remove shampoo and soap from your skin. Wet a clean washcloth and turn off the shower. Put CHG soap on washcloth and apply to your entire body from the neck down. Do not use on your head, face or private parts(genitals). Do not use CHG soap on open sores, wounds or areas of skin irritation. Wash you body gently for 5 minutes. Do not wash your skin too hard. This soap does not create lather. Pay special attention to your underarms and from your belly button to your feet. Turn the shower back on and rinse well to get CHG soap off your body. Pat your skin dry with a clean, dry towel. Do not apply lotions or moisturizer. Put on clean clothes and sleep on fresh bed sheets and do not allow pets to sleep with you. Shower with CHG soap 2 times before your surgery  The evening before your surgery  The morning of your surgery      Tips to help prevent infections after your surgery:  Protect your surgical wound from germs:  Hand washing is the most important thing you and your caregivers can do to prevent infections. Keep your bandage clean and dry! Do not touch your surgical wound. Use clean, freshly washed towels and washcloths every time you shower; do not share bath linens with others. Until your surgical wound is healed, wear clothing and sleep on bed linens each day that are clean and freshly washed. Do not allow pets to sleep in your bed with you or touch your surgical wound. Do not smoke - smoking delays wound healing. This may be a good time to stop smoking. If you have diabetes, it is important for you to manage your blood sugar levels properly before your surgery as well as after your surgery. Poorly managed blood sugar levels slow down wound healing and prevent you from healing completely. Patient Information Regarding COVID Restrictions    Day of Procedure    Please park in the parking deck or any designated visitor parking lot.   Enter the facility through the [employer] [facility] of the hospital.  On the day of surgery, please provide the cell phone number of the person who will be waiting for you to the Patient Access representative at the time of registration. Masks are highly recommended in the hospital, but not required. Once your procedure and the immediate recovery period is completed, a nurse in the recovery area will contact your designated visitor to inform them of your room number or to otherwise review other pertinent information regarding your care. Social distancing practices are strongly encouraged in waiting areas and the cafeteria. The patient was contacted  in person. She verbalized understanding of all instructions does not  need reinforcement.

## 2023-02-06 NOTE — PERIOP NOTES
PAT instructions reviewed with patient and given the opportunity to ask questions. Patient given two bottles CHG soap and instruction sheet, instructions for use reviewed with patient. Patient given incentive spirometer and instructions for use; repeat demonstration performed by patient. Patient instructed re: check-in procedure for day of surgery. Reviewed held orders placed in Epic by Dr. Ascencio [facility] office with Kenny Spear NP. She states repeat not required for labs done 2/1/23, EKG 1/31/23, CXR 1/16/23 as not ordered by [employer].

## 2023-02-06 NOTE — PROGRESS NOTES
Cardiac Surgery Care Coordinator- Met with Laisha Su and her . Introduced role of the Cardiac Surgery Co- nurse. Reviewed plan of care and encouraged Laishagloria Su to verbalize. Reviewed day of surgery expectations, including where and when to arrive, surgical waiting room instructions, NPO status and pain scale.  Nati Camacho RN

## 2023-02-07 ENCOUNTER — OFFICE VISIT (OUTPATIENT)
Dept: CARDIOLOGY CLINIC | Age: 73
End: 2023-02-07

## 2023-02-07 ENCOUNTER — ANESTHESIA (OUTPATIENT)
Dept: CARDIOTHORACIC SURGERY | Age: 73
DRG: 219 | End: 2023-02-07
Payer: COMMERCIAL

## 2023-02-07 ENCOUNTER — HOSPITAL ENCOUNTER (INPATIENT)
Age: 73
LOS: 8 days | Discharge: NURSING, REHAB OR LONG-TERM CARE | DRG: 219 | End: 2023-02-15
Attending: THORACIC SURGERY (CARDIOTHORACIC VASCULAR SURGERY) | Admitting: THORACIC SURGERY (CARDIOTHORACIC VASCULAR SURGERY)
Payer: COMMERCIAL

## 2023-02-07 ENCOUNTER — APPOINTMENT (OUTPATIENT)
Dept: GENERAL RADIOLOGY | Age: 73
DRG: 219 | End: 2023-02-07
Attending: NURSE PRACTITIONER
Payer: COMMERCIAL

## 2023-02-07 ENCOUNTER — HOSPITAL ENCOUNTER (OUTPATIENT)
Dept: NON INVASIVE DIAGNOSTICS | Age: 73
Discharge: HOME | End: 2023-02-07
Attending: THORACIC SURGERY (CARDIOTHORACIC VASCULAR SURGERY)

## 2023-02-07 ENCOUNTER — TRANSCRIBE ORDER (OUTPATIENT)
Dept: CARDIAC REHAB | Age: 73
End: 2023-02-07

## 2023-02-07 ENCOUNTER — ANESTHESIA EVENT (OUTPATIENT)
Dept: CARDIOTHORACIC SURGERY | Age: 73
DRG: 219 | End: 2023-02-07
Payer: COMMERCIAL

## 2023-02-07 DIAGNOSIS — I72.9 ANEURYSM (HCC): ICD-10-CM

## 2023-02-07 DIAGNOSIS — I35.0 NONRHEUMATIC AORTIC VALVE STENOSIS: Primary | ICD-10-CM

## 2023-02-07 DIAGNOSIS — Z98.890 S/P AORTIC VALVE REPAIR: Primary | ICD-10-CM

## 2023-02-07 DIAGNOSIS — I35.0 NONRHEUMATIC AORTIC VALVE STENOSIS: ICD-10-CM

## 2023-02-07 DIAGNOSIS — I35.1 NONRHEUMATIC AORTIC VALVE INSUFFICIENCY: ICD-10-CM

## 2023-02-07 DIAGNOSIS — I35.0 AORTIC STENOSIS, SEVERE: ICD-10-CM

## 2023-02-07 DIAGNOSIS — Z95.4 S/P AORTIC VALVE ALLOGRAFT: Primary | ICD-10-CM

## 2023-02-07 PROBLEM — I50.20 HFREF (HEART FAILURE WITH REDUCED EJECTION FRACTION) (HCC): Status: ACTIVE | Noted: 2023-02-07

## 2023-02-07 LAB
ADMINISTERED INITIALS, ADMINIT: NORMAL
ALBUMIN SERPL-MCNC: 2.4 G/DL (ref 3.5–5)
ALBUMIN SERPL-MCNC: 3 G/DL (ref 3.5–5)
ALBUMIN/GLOB SERPL: 1.5 (ref 1.1–2.2)
ALBUMIN/GLOB SERPL: 1.8 (ref 1.1–2.2)
ALP SERPL-CCNC: 25 U/L (ref 45–117)
ALP SERPL-CCNC: 32 U/L (ref 45–117)
ALT SERPL-CCNC: 18 U/L (ref 12–78)
ALT SERPL-CCNC: 24 U/L (ref 12–78)
ANION GAP SERPL CALC-SCNC: 7 MMOL/L (ref 5–15)
ANION GAP SERPL CALC-SCNC: 9 MMOL/L (ref 5–15)
APTT PPP: 33.6 SEC (ref 22.1–31)
APTT PPP: 51.7 SEC (ref 22.1–31)
APTT PPP: 55.2 SEC (ref 22.1–31)
AST SERPL-CCNC: 36 U/L (ref 15–37)
AST SERPL-CCNC: 53 U/L (ref 15–37)
BASE DEFICIT BLD-SCNC: 5.8 MMOL/L
BASE DEFICIT BLD-SCNC: 6.3 MMOL/L
BASE DEFICIT BLD-SCNC: 7.6 MMOL/L
BASOPHILS # BLD: 0 K/UL (ref 0–0.1)
BASOPHILS NFR BLD: 0 % (ref 0–1)
BDY SITE: ABNORMAL
BILIRUB SERPL-MCNC: 0.9 MG/DL (ref 0.2–1)
BILIRUB SERPL-MCNC: 1.2 MG/DL (ref 0.2–1)
BUN SERPL-MCNC: 10 MG/DL (ref 6–20)
BUN SERPL-MCNC: 14 MG/DL (ref 6–20)
BUN/CREAT SERPL: 19 (ref 12–20)
BUN/CREAT SERPL: 21 (ref 12–20)
CA-I BLD-SCNC: 1.23 MMOL/L (ref 1.12–1.32)
CA-I BLD-SCNC: 1.23 MMOL/L (ref 1.12–1.32)
CA-I BLD-SCNC: 1.26 MMOL/L (ref 1.12–1.32)
CALCIUM SERPL-MCNC: 7.6 MG/DL (ref 8.5–10.1)
CALCIUM SERPL-MCNC: 8 MG/DL (ref 8.5–10.1)
CHLORIDE SERPL-SCNC: 116 MMOL/L (ref 97–108)
CHLORIDE SERPL-SCNC: 117 MMOL/L (ref 97–108)
CO2 SERPL-SCNC: 18 MMOL/L (ref 21–32)
CO2 SERPL-SCNC: 21 MMOL/L (ref 21–32)
COHGB MFR BLD: 1.7 % (ref 1–2)
CREAT SERPL-MCNC: 0.48 MG/DL (ref 0.55–1.02)
CREAT SERPL-MCNC: 0.75 MG/DL (ref 0.55–1.02)
D50 ADMINISTERED, D50ADM: 0 ML
D50 ORDER, D50ORD: 0 ML
DIFFERENTIAL METHOD BLD: ABNORMAL
EOSINOPHIL # BLD: 0 K/UL (ref 0–0.4)
EOSINOPHIL NFR BLD: 0 % (ref 0–7)
ERYTHROCYTE [DISTWIDTH] IN BLOOD BY AUTOMATED COUNT: 14.5 % (ref 11.5–14.5)
FIBRINOGEN PPP-MCNC: 145 MG/DL (ref 200–475)
FIBRINOGEN PPP-MCNC: 145 MG/DL (ref 200–475)
FIBRINOGEN PPP-MCNC: 285 MG/DL (ref 200–475)
GAS FLOW.O2 O2 DELIVERY SYS: ABNORMAL
GAS FLOW.O2 O2 DELIVERY SYS: ABNORMAL
GAS FLOW.O2 SETTING OXYMISER: 18 BPM
GLOBULIN SER CALC-MCNC: 1.3 G/DL (ref 2–4)
GLOBULIN SER CALC-MCNC: 2 G/DL (ref 2–4)
GLUCOSE BLD STRIP.AUTO-MCNC: 109 MG/DL (ref 65–117)
GLUCOSE BLD STRIP.AUTO-MCNC: 118 MG/DL (ref 65–117)
GLUCOSE BLD STRIP.AUTO-MCNC: 139 MG/DL (ref 65–117)
GLUCOSE BLD STRIP.AUTO-MCNC: 142 MG/DL (ref 65–117)
GLUCOSE BLD STRIP.AUTO-MCNC: 147 MG/DL (ref 65–117)
GLUCOSE BLD STRIP.AUTO-MCNC: 156 MG/DL (ref 65–117)
GLUCOSE BLD STRIP.AUTO-MCNC: 161 MG/DL (ref 65–117)
GLUCOSE BLD STRIP.AUTO-MCNC: 172 MG/DL (ref 65–117)
GLUCOSE BLD STRIP.AUTO-MCNC: 177 MG/DL (ref 65–117)
GLUCOSE SERPL-MCNC: 130 MG/DL (ref 65–100)
GLUCOSE SERPL-MCNC: 204 MG/DL (ref 65–100)
GLUCOSE, GLC: 109 MG/DL
GLUCOSE, GLC: 116 MG/DL
GLUCOSE, GLC: 118 MG/DL
GLUCOSE, GLC: 122 MG/DL
GLUCOSE, GLC: 122 MG/DL
GLUCOSE, GLC: 123 MG/DL
GLUCOSE, GLC: 134 MG/DL
GLUCOSE, GLC: 139 MG/DL
GLUCOSE, GLC: 142 MG/DL
GLUCOSE, GLC: 145 MG/DL
GLUCOSE, GLC: 147 MG/DL
GLUCOSE, GLC: 156 MG/DL
GLUCOSE, GLC: 161 MG/DL
GLUCOSE, GLC: 172 MG/DL
GLUCOSE, GLC: 177 MG/DL
HCO3 BLD-SCNC: 18.4 MMOL/L (ref 22–26)
HCO3 BLD-SCNC: 20.1 MMOL/L (ref 22–26)
HCO3 BLD-SCNC: 21.5 MMOL/L (ref 22–26)
HCT VFR BLD AUTO: 43.3 % (ref 35–47)
HCT VFR BLD AUTO: 48.6 % (ref 35–47)
HGB BLD-MCNC: 14 G/DL (ref 11.5–16)
HGB BLD-MCNC: 15.6 G/DL (ref 11.5–16)
HIGH TARGET, HITG: 130 MG/DL
HIGH TARGET, HITG: 140 MG/DL
IMM GRANULOCYTES # BLD AUTO: 0.3 K/UL (ref 0–0.04)
IMM GRANULOCYTES NFR BLD AUTO: 2 % (ref 0–0.5)
INR PPP: 1.4 (ref 0.9–1.1)
INR PPP: 1.5 (ref 0.9–1.1)
INSULIN ADMINSTERED, INSADM: 1.9 UNITS/HOUR
INSULIN ADMINSTERED, INSADM: 1.9 UNITS/HOUR
INSULIN ADMINSTERED, INSADM: 10.5 UNITS/HOUR
INSULIN ADMINSTERED, INSADM: 2 UNITS/HOUR
INSULIN ADMINSTERED, INSADM: 2.2 UNITS/HOUR
INSULIN ADMINSTERED, INSADM: 2.3 UNITS/HOUR
INSULIN ADMINSTERED, INSADM: 2.5 UNITS/HOUR
INSULIN ADMINSTERED, INSADM: 3 UNITS/HOUR
INSULIN ADMINSTERED, INSADM: 3.2 UNITS/HOUR
INSULIN ADMINSTERED, INSADM: 3.4 UNITS/HOUR
INSULIN ADMINSTERED, INSADM: 4.8 UNITS/HOUR
INSULIN ADMINSTERED, INSADM: 6.1 UNITS/HOUR
INSULIN ADMINSTERED, INSADM: 6.7 UNITS/HOUR
INSULIN ADMINSTERED, INSADM: 8.1 UNITS/HOUR
INSULIN ADMINSTERED, INSADM: 8.2 UNITS/HOUR
INSULIN ORDER, INSORD: 1.9 UNITS/HOUR
INSULIN ORDER, INSORD: 1.9 UNITS/HOUR
INSULIN ORDER, INSORD: 10.5 UNITS/HOUR
INSULIN ORDER, INSORD: 2 UNITS/HOUR
INSULIN ORDER, INSORD: 2.2 UNITS/HOUR
INSULIN ORDER, INSORD: 2.3 UNITS/HOUR
INSULIN ORDER, INSORD: 2.5 UNITS/HOUR
INSULIN ORDER, INSORD: 3 UNITS/HOUR
INSULIN ORDER, INSORD: 3.2 UNITS/HOUR
INSULIN ORDER, INSORD: 3.4 UNITS/HOUR
INSULIN ORDER, INSORD: 4.8 UNITS/HOUR
INSULIN ORDER, INSORD: 6.1 UNITS/HOUR
INSULIN ORDER, INSORD: 6.7 UNITS/HOUR
INSULIN ORDER, INSORD: 8.1 UNITS/HOUR
INSULIN ORDER, INSORD: 8.2 UNITS/HOUR
LACTATE SERPL-SCNC: 4.1 MMOL/L (ref 0.4–2)
LOW TARGET, LOT: 100 MG/DL
LOW TARGET, LOT: 95 MG/DL
LYMPHOCYTES # BLD: 1.1 K/UL (ref 0.8–3.5)
LYMPHOCYTES NFR BLD: 7 % (ref 12–49)
MAGNESIUM SERPL-MCNC: 2.1 MG/DL (ref 1.6–2.4)
MAGNESIUM SERPL-MCNC: 2.3 MG/DL (ref 1.6–2.4)
MCH RBC QN AUTO: 28.5 PG (ref 26–34)
MCHC RBC AUTO-ENTMCNC: 32.3 G/DL (ref 30–36.5)
MCV RBC AUTO: 88.2 FL (ref 80–99)
METHGB MFR BLD: 0.3 % (ref 0–1.4)
MINUTES UNTIL NEXT BG, NBG: 60 MIN
MONOCYTES # BLD: 1.1 K/UL (ref 0–1)
MONOCYTES NFR BLD: 7 % (ref 5–13)
MULTIPLIER, MUL: 0.03
MULTIPLIER, MUL: 0.03
MULTIPLIER, MUL: 0.04
MULTIPLIER, MUL: 0.05
MULTIPLIER, MUL: 0.06
MULTIPLIER, MUL: 0.07
MULTIPLIER, MUL: 0.08
MULTIPLIER, MUL: 0.09
MULTIPLIER, MUL: 0.1
NEUTS SEG # BLD: 13.9 K/UL (ref 1.8–8)
NEUTS SEG NFR BLD: 84 % (ref 32–75)
NRBC # BLD: 0 K/UL (ref 0–0.01)
NRBC BLD-RTO: 0 PER 100 WBC
O2/TOTAL GAS SETTING VFR VENT: 80 %
O2/TOTAL GAS SETTING VFR VENT: 80 %
ORDER INITIALS, ORDINIT: NORMAL
OXYHGB MFR BLD: 57 % (ref 94–97)
PCO2 BLD: 38.5 MMHG (ref 35–45)
PCO2 BLD: 42 MMHG (ref 35–45)
PCO2 BLD: 47.5 MMHG (ref 35–45)
PEEP RESPIRATORY: 5 CMH2O
PEEP RESPIRATORY: 8 CMH2O
PH BLD: 7.26 (ref 7.35–7.45)
PH BLD: 7.29 (ref 7.35–7.45)
PH BLD: 7.29 (ref 7.35–7.45)
PLATELET # BLD AUTO: 80 K/UL (ref 150–400)
PMV BLD AUTO: 9.4 FL (ref 8.9–12.9)
PO2 BLD: 121 MMHG (ref 80–100)
PO2 BLD: 126 MMHG (ref 80–100)
PO2 BLD: 89 MMHG (ref 80–100)
POTASSIUM SERPL-SCNC: 3.5 MMOL/L (ref 3.5–5.1)
POTASSIUM SERPL-SCNC: 3.8 MMOL/L (ref 3.5–5.1)
PRESSURE SUPPORT SETTING VENT: 5 CMH2O
PROT SERPL-MCNC: 3.7 G/DL (ref 6.4–8.2)
PROT SERPL-MCNC: 5 G/DL (ref 6.4–8.2)
PROTHROMBIN TIME: 13.9 SEC (ref 9–11.1)
PROTHROMBIN TIME: 15.1 SEC (ref 9–11.1)
RBC # BLD AUTO: 4.91 M/UL (ref 3.8–5.2)
RBC MORPH BLD: ABNORMAL
SAO2 % BLD: 58 % (ref 95–99)
SAO2 % BLD: 95.3 % (ref 92–97)
SAO2 % BLD: 98.2 % (ref 92–97)
SAO2 % BLD: 98.4 % (ref 92–97)
SERVICE CMNT-IMP: ABNORMAL
SERVICE CMNT-IMP: NORMAL
SODIUM SERPL-SCNC: 143 MMOL/L (ref 136–145)
SODIUM SERPL-SCNC: 145 MMOL/L (ref 136–145)
SPECIMEN SITE: ABNORMAL
SPECIMEN TYPE: ABNORMAL
THERAPEUTIC RANGE,PTTT: ABNORMAL SECS (ref 58–77)
VENTILATION MODE VENT: ABNORMAL
VENTILATION MODE VENT: ABNORMAL
VT SETTING VENT: 380 ML
VT SETTING VENT: 380 ML
WBC # BLD AUTO: 16.4 K/UL (ref 3.6–11)

## 2023-02-07 PROCEDURE — 74011000258 HC RX REV CODE- 258: Performed by: THORACIC SURGERY (CARDIOTHORACIC VASCULAR SURGERY)

## 2023-02-07 PROCEDURE — 65610000003 HC RM ICU SURGICAL

## 2023-02-07 PROCEDURE — 74011250636 HC RX REV CODE- 250/636: Performed by: THORACIC SURGERY (CARDIOTHORACIC VASCULAR SURGERY)

## 2023-02-07 PROCEDURE — C1751 CATH, INF, PER/CENT/MIDLINE: HCPCS

## 2023-02-07 PROCEDURE — 77030020140: Performed by: THORACIC SURGERY (CARDIOTHORACIC VASCULAR SURGERY)

## 2023-02-07 PROCEDURE — 77030020167 HC PERF SET MULT MEDT -B: Performed by: THORACIC SURGERY (CARDIOTHORACIC VASCULAR SURGERY)

## 2023-02-07 PROCEDURE — 77030010506 HC ADH BIOGLU CRYO -G: Performed by: THORACIC SURGERY (CARDIOTHORACIC VASCULAR SURGERY)

## 2023-02-07 PROCEDURE — 77030010819: Performed by: THORACIC SURGERY (CARDIOTHORACIC VASCULAR SURGERY)

## 2023-02-07 PROCEDURE — 74011000250 HC RX REV CODE- 250: Performed by: THORACIC SURGERY (CARDIOTHORACIC VASCULAR SURGERY)

## 2023-02-07 PROCEDURE — 77030002933 HC SUT MCRYL J&J -A: Performed by: THORACIC SURGERY (CARDIOTHORACIC VASCULAR SURGERY)

## 2023-02-07 PROCEDURE — 85730 THROMBOPLASTIN TIME PARTIAL: CPT

## 2023-02-07 PROCEDURE — 82962 GLUCOSE BLOOD TEST: CPT

## 2023-02-07 PROCEDURE — 77030021177: Performed by: THORACIC SURGERY (CARDIOTHORACIC VASCULAR SURGERY)

## 2023-02-07 PROCEDURE — 77030006994: Performed by: THORACIC SURGERY (CARDIOTHORACIC VASCULAR SURGERY)

## 2023-02-07 PROCEDURE — P9045 ALBUMIN (HUMAN), 5%, 250 ML: HCPCS | Performed by: NURSE PRACTITIONER

## 2023-02-07 PROCEDURE — P9100 PATHOGEN TEST FOR PLATELETS: HCPCS

## 2023-02-07 PROCEDURE — 77030011235 HC DRN PERCRD SUMP MEDT -B: Performed by: THORACIC SURGERY (CARDIOTHORACIC VASCULAR SURGERY)

## 2023-02-07 PROCEDURE — 83605 ASSAY OF LACTIC ACID: CPT

## 2023-02-07 PROCEDURE — 77030032400 HC CATH VENT LT HRT LIVA -B: Performed by: THORACIC SURGERY (CARDIOTHORACIC VASCULAR SURGERY)

## 2023-02-07 PROCEDURE — 74011000250 HC RX REV CODE- 250: Performed by: NURSE PRACTITIONER

## 2023-02-07 PROCEDURE — 36600 WITHDRAWAL OF ARTERIAL BLOOD: CPT

## 2023-02-07 PROCEDURE — P9035 PLATELET PHERES LEUKOREDUCED: HCPCS

## 2023-02-07 PROCEDURE — 74011250636 HC RX REV CODE- 250/636: Performed by: NURSE ANESTHETIST, CERTIFIED REGISTERED

## 2023-02-07 PROCEDURE — 77030033069 HC RLD QLC SGL COR-KNOT LSIS -B: Performed by: THORACIC SURGERY (CARDIOTHORACIC VASCULAR SURGERY)

## 2023-02-07 PROCEDURE — 77030005401 HC CATH RAD ARRO -A

## 2023-02-07 PROCEDURE — 74011250636 HC RX REV CODE- 250/636: Performed by: NURSE PRACTITIONER

## 2023-02-07 PROCEDURE — 74011000258 HC RX REV CODE- 258: Performed by: NURSE PRACTITIONER

## 2023-02-07 PROCEDURE — 02RX0JZ REPLACEMENT OF THORACIC AORTA, ASCENDING/ARCH WITH SYNTHETIC SUBSTITUTE, OPEN APPROACH: ICD-10-PCS | Performed by: THORACIC SURGERY (CARDIOTHORACIC VASCULAR SURGERY)

## 2023-02-07 PROCEDURE — 02RF08Z REPLACEMENT OF AORTIC VALVE WITH ZOOPLASTIC TISSUE, OPEN APPROACH: ICD-10-PCS | Performed by: THORACIC SURGERY (CARDIOTHORACIC VASCULAR SURGERY)

## 2023-02-07 PROCEDURE — 77030002986 HC SUT PROL J&J -A: Performed by: THORACIC SURGERY (CARDIOTHORACIC VASCULAR SURGERY)

## 2023-02-07 PROCEDURE — 74011250636 HC RX REV CODE- 250/636: Performed by: ANESTHESIOLOGY

## 2023-02-07 PROCEDURE — 85384 FIBRINOGEN ACTIVITY: CPT

## 2023-02-07 PROCEDURE — 77030034936 HC DEV MIN COR-KNOT KT LSIS -F: Performed by: THORACIC SURGERY (CARDIOTHORACIC VASCULAR SURGERY)

## 2023-02-07 PROCEDURE — 74011000636 HC RX REV CODE- 636: Performed by: THORACIC SURGERY (CARDIOTHORACIC VASCULAR SURGERY)

## 2023-02-07 PROCEDURE — 77030015757: Performed by: THORACIC SURGERY (CARDIOTHORACIC VASCULAR SURGERY)

## 2023-02-07 PROCEDURE — 77030005402 HC CATH RAD ART LN KT TELE -B

## 2023-02-07 PROCEDURE — 77030012390 HC DRN CHST BTL GTNG -B: Performed by: THORACIC SURGERY (CARDIOTHORACIC VASCULAR SURGERY)

## 2023-02-07 PROCEDURE — 77030002978 HC SUT POLY TELE -A: Performed by: THORACIC SURGERY (CARDIOTHORACIC VASCULAR SURGERY)

## 2023-02-07 PROCEDURE — 77030003020 HC SUT TICRN COVD -A: Performed by: THORACIC SURGERY (CARDIOTHORACIC VASCULAR SURGERY)

## 2023-02-07 PROCEDURE — 82803 BLOOD GASES ANY COMBINATION: CPT

## 2023-02-07 PROCEDURE — 94002 VENT MGMT INPAT INIT DAY: CPT

## 2023-02-07 PROCEDURE — 03HY32Z INSERTION OF MONITORING DEVICE INTO UPPER ARTERY, PERCUTANEOUS APPROACH: ICD-10-PCS | Performed by: THORACIC SURGERY (CARDIOTHORACIC VASCULAR SURGERY)

## 2023-02-07 PROCEDURE — 71045 X-RAY EXAM CHEST 1 VIEW: CPT

## 2023-02-07 PROCEDURE — 94664 DEMO&/EVAL PT USE INHALER: CPT

## 2023-02-07 PROCEDURE — 80053 COMPREHEN METABOLIC PANEL: CPT

## 2023-02-07 PROCEDURE — 02HP32Z INSERTION OF MONITORING DEVICE INTO PULMONARY TRUNK, PERCUTANEOUS APPROACH: ICD-10-PCS | Performed by: THORACIC SURGERY (CARDIOTHORACIC VASCULAR SURGERY)

## 2023-02-07 PROCEDURE — 74011000250 HC RX REV CODE- 250: Performed by: INTERNAL MEDICINE

## 2023-02-07 PROCEDURE — 36415 COLL VENOUS BLD VENIPUNCTURE: CPT

## 2023-02-07 PROCEDURE — 74011000250 HC RX REV CODE- 250: Performed by: NURSE ANESTHETIST, CERTIFIED REGISTERED

## 2023-02-07 PROCEDURE — 77030019702 HC WRP THER MENM -C: Performed by: THORACIC SURGERY (CARDIOTHORACIC VASCULAR SURGERY)

## 2023-02-07 PROCEDURE — 77030026438 HC STYL ET INTUB CARD -A: Performed by: ANESTHESIOLOGY

## 2023-02-07 PROCEDURE — 74011000258 HC RX REV CODE- 258: Performed by: NURSE ANESTHETIST, CERTIFIED REGISTERED

## 2023-02-07 PROCEDURE — 74011636637 HC RX REV CODE- 636/637: Performed by: NURSE ANESTHETIST, CERTIFIED REGISTERED

## 2023-02-07 PROCEDURE — P9073 PLATELETS PHERESIS PATH REDU: HCPCS

## 2023-02-07 PROCEDURE — 77030008771 HC TU NG SALEM SUMP -A: Performed by: ANESTHESIOLOGY

## 2023-02-07 PROCEDURE — 77030037878 HC DRSG MEPILEX >48IN BORD MOLN -B: Performed by: THORACIC SURGERY (CARDIOTHORACIC VASCULAR SURGERY)

## 2023-02-07 PROCEDURE — 82375 ASSAY CARBOXYHB QUANT: CPT

## 2023-02-07 PROCEDURE — 5A1945Z RESPIRATORY VENTILATION, 24-96 CONSECUTIVE HOURS: ICD-10-PCS | Performed by: THORACIC SURGERY (CARDIOTHORACIC VASCULAR SURGERY)

## 2023-02-07 PROCEDURE — 77030010797: Performed by: THORACIC SURGERY (CARDIOTHORACIC VASCULAR SURGERY)

## 2023-02-07 PROCEDURE — 77030002973 HC SUT PLEDG CV SFT OVL TELE -B: Performed by: THORACIC SURGERY (CARDIOTHORACIC VASCULAR SURGERY)

## 2023-02-07 PROCEDURE — 77030002996 HC SUT SLK J&J -A: Performed by: THORACIC SURGERY (CARDIOTHORACIC VASCULAR SURGERY)

## 2023-02-07 PROCEDURE — 76060000045 HC ANESTHESIA 7 TO 7.5 HR: Performed by: THORACIC SURGERY (CARDIOTHORACIC VASCULAR SURGERY)

## 2023-02-07 PROCEDURE — 77030013798 HC KT TRNSDUC PRSSR EDWD -B: Performed by: THORACIC SURGERY (CARDIOTHORACIC VASCULAR SURGERY)

## 2023-02-07 PROCEDURE — P9045 ALBUMIN (HUMAN), 5%, 250 ML: HCPCS | Performed by: NURSE ANESTHETIST, CERTIFIED REGISTERED

## 2023-02-07 PROCEDURE — 88304 TISSUE EXAM BY PATHOLOGIST: CPT

## 2023-02-07 PROCEDURE — 83735 ASSAY OF MAGNESIUM: CPT

## 2023-02-07 PROCEDURE — 5A1221Z PERFORMANCE OF CARDIAC OUTPUT, CONTINUOUS: ICD-10-PCS | Performed by: THORACIC SURGERY (CARDIOTHORACIC VASCULAR SURGERY)

## 2023-02-07 PROCEDURE — 77030041244 HC CBL PACE EXT TEMP REMG -B: Performed by: THORACIC SURGERY (CARDIOTHORACIC VASCULAR SURGERY)

## 2023-02-07 PROCEDURE — 77030041469 HC VLV AORT INSPIRIS EDWD -K4: Performed by: THORACIC SURGERY (CARDIOTHORACIC VASCULAR SURGERY)

## 2023-02-07 PROCEDURE — 74011250637 HC RX REV CODE- 250/637: Performed by: NURSE PRACTITIONER

## 2023-02-07 PROCEDURE — 77030008684 HC TU ET CUF COVD -B: Performed by: ANESTHESIOLOGY

## 2023-02-07 PROCEDURE — P9059 PLASMA, FRZ BETWEEN 8-24HOUR: HCPCS

## 2023-02-07 PROCEDURE — 77030007667 HC INSRT SUT HLD MEDT -B: Performed by: THORACIC SURGERY (CARDIOTHORACIC VASCULAR SURGERY)

## 2023-02-07 PROCEDURE — P9012 CRYOPRECIPITATE EACH UNIT: HCPCS

## 2023-02-07 PROCEDURE — 77030014008 HC SPNG HEMSTAT J&J -C: Performed by: THORACIC SURGERY (CARDIOTHORACIC VASCULAR SURGERY)

## 2023-02-07 PROCEDURE — P9016 RBC LEUKOCYTES REDUCED: HCPCS

## 2023-02-07 PROCEDURE — 85610 PROTHROMBIN TIME: CPT

## 2023-02-07 PROCEDURE — 94640 AIRWAY INHALATION TREATMENT: CPT

## 2023-02-07 PROCEDURE — 76010000120 HC CV SURG 7 TO 7.5 HR: Performed by: THORACIC SURGERY (CARDIOTHORACIC VASCULAR SURGERY)

## 2023-02-07 PROCEDURE — 77030003029 HC SUT VCRL J&J -B: Performed by: THORACIC SURGERY (CARDIOTHORACIC VASCULAR SURGERY)

## 2023-02-07 PROCEDURE — C1768 GRAFT, VASCULAR: HCPCS | Performed by: THORACIC SURGERY (CARDIOTHORACIC VASCULAR SURGERY)

## 2023-02-07 PROCEDURE — P9047 ALBUMIN (HUMAN), 25%, 50ML: HCPCS | Performed by: THORACIC SURGERY (CARDIOTHORACIC VASCULAR SURGERY)

## 2023-02-07 PROCEDURE — 85025 COMPLETE CBC W/AUTO DIFF WBC: CPT

## 2023-02-07 PROCEDURE — 77030003010 HC SUT SURG STL J&J -B: Performed by: THORACIC SURGERY (CARDIOTHORACIC VASCULAR SURGERY)

## 2023-02-07 PROCEDURE — 36430 TRANSFUSION BLD/BLD COMPNT: CPT

## 2023-02-07 PROCEDURE — 77030033070 HC RLD QLC FPCH COR-KNOT LSIS -C: Performed by: THORACIC SURGERY (CARDIOTHORACIC VASCULAR SURGERY)

## 2023-02-07 PROCEDURE — 2709999900 HC NON-CHARGEABLE SUPPLY: Performed by: THORACIC SURGERY (CARDIOTHORACIC VASCULAR SURGERY)

## 2023-02-07 PROCEDURE — 85018 HEMOGLOBIN: CPT

## 2023-02-07 PROCEDURE — 93355 ECHO TRANSESOPHAGEAL (TEE): CPT | Performed by: THORACIC SURGERY (CARDIOTHORACIC VASCULAR SURGERY)

## 2023-02-07 DEVICE — VALVE AORT 21MM REPL RESILIENT BOV PERICARD CO CHROMIUM ALLY: Type: IMPLANTABLE DEVICE | Site: AORTIC VALVE | Status: FUNCTIONAL

## 2023-02-07 DEVICE — HEMASHIELD PLATINUM WOVEN STRAIGHT DOUBLE VELOUR VASCULAR GRAFT WITH GRAFT SIZER ACCESSORY
Type: IMPLANTABLE DEVICE | Site: AORTA | Status: FUNCTIONAL
Brand: HEMASHIELD

## 2023-02-07 RX ORDER — INSULIN LISPRO 100 [IU]/ML
INJECTION, SOLUTION INTRAVENOUS; SUBCUTANEOUS
Status: DISCONTINUED | OUTPATIENT
Start: 2023-02-07 | End: 2023-02-13

## 2023-02-07 RX ORDER — FENTANYL CITRATE 50 UG/ML
25 INJECTION, SOLUTION INTRAMUSCULAR; INTRAVENOUS
Status: CANCELLED | OUTPATIENT
Start: 2023-02-07

## 2023-02-07 RX ORDER — MIDAZOLAM HYDROCHLORIDE 1 MG/ML
1 INJECTION, SOLUTION INTRAMUSCULAR; INTRAVENOUS AS NEEDED
Status: DISCONTINUED | OUTPATIENT
Start: 2023-02-07 | End: 2023-02-07 | Stop reason: HOSPADM

## 2023-02-07 RX ORDER — SODIUM CHLORIDE, SODIUM LACTATE, POTASSIUM CHLORIDE, CALCIUM CHLORIDE 600; 310; 30; 20 MG/100ML; MG/100ML; MG/100ML; MG/100ML
100 INJECTION, SOLUTION INTRAVENOUS CONTINUOUS
Status: DISCONTINUED | OUTPATIENT
Start: 2023-02-07 | End: 2023-02-07 | Stop reason: HOSPADM

## 2023-02-07 RX ORDER — FAMOTIDINE 20 MG/1
20 TABLET, FILM COATED ORAL EVERY 12 HOURS
Status: DISCONTINUED | OUTPATIENT
Start: 2023-02-08 | End: 2023-02-07

## 2023-02-07 RX ORDER — SODIUM CHLORIDE 9 MG/ML
250 INJECTION, SOLUTION INTRAVENOUS AS NEEDED
Status: DISCONTINUED | OUTPATIENT
Start: 2023-02-07 | End: 2023-02-07 | Stop reason: HOSPADM

## 2023-02-07 RX ORDER — SODIUM CHLORIDE 9 MG/ML
250 INJECTION, SOLUTION INTRAVENOUS AS NEEDED
Status: DISCONTINUED | OUTPATIENT
Start: 2023-02-07 | End: 2023-02-09 | Stop reason: ALTCHOICE

## 2023-02-07 RX ORDER — SODIUM CHLORIDE 450 MG/100ML
50 INJECTION, SOLUTION INTRAVENOUS CONTINUOUS
Status: DISCONTINUED | OUTPATIENT
Start: 2023-02-07 | End: 2023-02-09

## 2023-02-07 RX ORDER — HEPARIN SODIUM 1000 [USP'U]/ML
INJECTION, SOLUTION INTRAVENOUS; SUBCUTANEOUS AS NEEDED
Status: DISCONTINUED | OUTPATIENT
Start: 2023-02-07 | End: 2023-02-07 | Stop reason: HOSPADM

## 2023-02-07 RX ORDER — OXYBUTYNIN CHLORIDE 5 MG/1
10 TABLET, EXTENDED RELEASE ORAL
Status: DISCONTINUED | OUTPATIENT
Start: 2023-02-07 | End: 2023-02-15 | Stop reason: HOSPADM

## 2023-02-07 RX ORDER — ALBUMIN HUMAN 50 G/1000ML
SOLUTION INTRAVENOUS AS NEEDED
Status: DISCONTINUED | OUTPATIENT
Start: 2023-02-07 | End: 2023-02-07 | Stop reason: HOSPADM

## 2023-02-07 RX ORDER — GUAIFENESIN 100 MG/5ML
81 LIQUID (ML) ORAL DAILY
Status: DISCONTINUED | OUTPATIENT
Start: 2023-02-08 | End: 2023-02-15 | Stop reason: HOSPADM

## 2023-02-07 RX ORDER — SODIUM CHLORIDE, SODIUM LACTATE, POTASSIUM CHLORIDE, CALCIUM CHLORIDE 600; 310; 30; 20 MG/100ML; MG/100ML; MG/100ML; MG/100ML
INJECTION, SOLUTION INTRAVENOUS
Status: DISCONTINUED | OUTPATIENT
Start: 2023-02-07 | End: 2023-02-07 | Stop reason: HOSPADM

## 2023-02-07 RX ORDER — BACITRACIN 500 UNIT/G
1 PACKET (EA) TOPICAL AS NEEDED
Status: DISCONTINUED | OUTPATIENT
Start: 2023-02-07 | End: 2023-02-11

## 2023-02-07 RX ORDER — SODIUM CHLORIDE 0.9 % (FLUSH) 0.9 %
5-40 SYRINGE (ML) INJECTION EVERY 8 HOURS
Status: DISCONTINUED | OUTPATIENT
Start: 2023-02-07 | End: 2023-02-07 | Stop reason: HOSPADM

## 2023-02-07 RX ORDER — POLYETHYLENE GLYCOL 3350 17 G/17G
17 POWDER, FOR SOLUTION ORAL DAILY
Status: DISCONTINUED | OUTPATIENT
Start: 2023-02-08 | End: 2023-02-13

## 2023-02-07 RX ORDER — MAGNESIUM SULFATE 1 G/100ML
1 INJECTION INTRAVENOUS AS NEEDED
Status: DISCONTINUED | OUTPATIENT
Start: 2023-02-07 | End: 2023-02-15 | Stop reason: HOSPADM

## 2023-02-07 RX ORDER — ACETAMINOPHEN 500 MG
1000 TABLET ORAL EVERY 6 HOURS
Status: COMPLETED | OUTPATIENT
Start: 2023-02-07 | End: 2023-02-09

## 2023-02-07 RX ORDER — DESMOPRESSIN ACETATE 4 UG/ML
INJECTION, SOLUTION INTRAVENOUS; SUBCUTANEOUS AS NEEDED
Status: DISCONTINUED | OUTPATIENT
Start: 2023-02-07 | End: 2023-02-07 | Stop reason: HOSPADM

## 2023-02-07 RX ORDER — ROPIVACAINE HYDROCHLORIDE 5 MG/ML
30 INJECTION, SOLUTION EPIDURAL; INFILTRATION; PERINEURAL AS NEEDED
Status: DISCONTINUED | OUTPATIENT
Start: 2023-02-07 | End: 2023-02-07 | Stop reason: HOSPADM

## 2023-02-07 RX ORDER — IBUPROFEN 200 MG
4 TABLET ORAL AS NEEDED
Status: DISCONTINUED | OUTPATIENT
Start: 2023-02-07 | End: 2023-02-15 | Stop reason: HOSPADM

## 2023-02-07 RX ORDER — DEXMEDETOMIDINE HYDROCHLORIDE 4 UG/ML
.1-1.5 INJECTION, SOLUTION INTRAVENOUS
Status: DISCONTINUED | OUTPATIENT
Start: 2023-02-07 | End: 2023-02-09

## 2023-02-07 RX ORDER — FENTANYL CITRATE 50 UG/ML
INJECTION, SOLUTION INTRAMUSCULAR; INTRAVENOUS AS NEEDED
Status: DISCONTINUED | OUTPATIENT
Start: 2023-02-07 | End: 2023-02-07 | Stop reason: HOSPADM

## 2023-02-07 RX ORDER — LIDOCAINE HYDROCHLORIDE 20 MG/ML
INJECTION, SOLUTION EPIDURAL; INFILTRATION; INTRACAUDAL; PERINEURAL AS NEEDED
Status: DISCONTINUED | OUTPATIENT
Start: 2023-02-07 | End: 2023-02-07 | Stop reason: HOSPADM

## 2023-02-07 RX ORDER — SODIUM CHLORIDE 0.9 % (FLUSH) 0.9 %
5-40 SYRINGE (ML) INJECTION AS NEEDED
Status: CANCELLED | OUTPATIENT
Start: 2023-02-07

## 2023-02-07 RX ORDER — MIDAZOLAM HYDROCHLORIDE 1 MG/ML
0.5 INJECTION, SOLUTION INTRAMUSCULAR; INTRAVENOUS
Status: CANCELLED | OUTPATIENT
Start: 2023-02-07

## 2023-02-07 RX ORDER — OXYCODONE HYDROCHLORIDE 5 MG/1
10 TABLET ORAL
Status: DISCONTINUED | OUTPATIENT
Start: 2023-02-07 | End: 2023-02-15 | Stop reason: HOSPADM

## 2023-02-07 RX ORDER — OXYCODONE HYDROCHLORIDE 5 MG/1
5 TABLET ORAL
Status: DISCONTINUED | OUTPATIENT
Start: 2023-02-07 | End: 2023-02-15 | Stop reason: HOSPADM

## 2023-02-07 RX ORDER — ALBUMIN HUMAN 50 G/1000ML
250 SOLUTION INTRAVENOUS
Status: DISPENSED | OUTPATIENT
Start: 2023-02-07 | End: 2023-02-07

## 2023-02-07 RX ORDER — SODIUM BICARBONATE 1 MEQ/ML
100 SYRINGE (ML) INTRAVENOUS ONCE
Status: COMPLETED | OUTPATIENT
Start: 2023-02-07 | End: 2023-02-07

## 2023-02-07 RX ORDER — ONDANSETRON 2 MG/ML
4 INJECTION INTRAMUSCULAR; INTRAVENOUS
Status: DISCONTINUED | OUTPATIENT
Start: 2023-02-07 | End: 2023-02-15 | Stop reason: HOSPADM

## 2023-02-07 RX ORDER — HYDROCORTISONE SODIUM SUCCINATE 100 MG/2ML
INJECTION, POWDER, FOR SOLUTION INTRAMUSCULAR; INTRAVENOUS AS NEEDED
Status: DISCONTINUED | OUTPATIENT
Start: 2023-02-07 | End: 2023-02-07 | Stop reason: HOSPADM

## 2023-02-07 RX ORDER — HYDROMORPHONE HYDROCHLORIDE 1 MG/ML
0.5 INJECTION, SOLUTION INTRAMUSCULAR; INTRAVENOUS; SUBCUTANEOUS
Status: DISCONTINUED | OUTPATIENT
Start: 2023-02-07 | End: 2023-02-08

## 2023-02-07 RX ORDER — CALCIUM CHLORIDE INJECTION 100 MG/ML
INJECTION, SOLUTION INTRAVENOUS AS NEEDED
Status: DISCONTINUED | OUTPATIENT
Start: 2023-02-07 | End: 2023-02-07 | Stop reason: HOSPADM

## 2023-02-07 RX ORDER — POTASSIUM CHLORIDE 29.8 MG/ML
20 INJECTION INTRAVENOUS ONCE
Status: COMPLETED | OUTPATIENT
Start: 2023-02-07 | End: 2023-02-07

## 2023-02-07 RX ORDER — DOBUTAMINE HYDROCHLORIDE 200 MG/100ML
0-10 INJECTION INTRAVENOUS
Status: DISCONTINUED | OUTPATIENT
Start: 2023-02-07 | End: 2023-02-09

## 2023-02-07 RX ORDER — PROPOFOL 10 MG/ML
0-50 VIAL (ML) INTRAVENOUS
Status: DISCONTINUED | OUTPATIENT
Start: 2023-02-07 | End: 2023-02-09

## 2023-02-07 RX ORDER — SODIUM CHLORIDE 9 MG/ML
9 INJECTION, SOLUTION INTRAVENOUS CONTINUOUS
Status: DISCONTINUED | OUTPATIENT
Start: 2023-02-07 | End: 2023-02-11

## 2023-02-07 RX ORDER — SODIUM CHLORIDE 9 MG/ML
25 INJECTION, SOLUTION INTRAVENOUS CONTINUOUS
Status: DISCONTINUED | OUTPATIENT
Start: 2023-02-07 | End: 2023-02-07 | Stop reason: HOSPADM

## 2023-02-07 RX ORDER — HYDROMORPHONE HYDROCHLORIDE 2 MG/ML
INJECTION, SOLUTION INTRAMUSCULAR; INTRAVENOUS; SUBCUTANEOUS AS NEEDED
Status: DISCONTINUED | OUTPATIENT
Start: 2023-02-07 | End: 2023-02-07 | Stop reason: HOSPADM

## 2023-02-07 RX ORDER — DIPHENHYDRAMINE HYDROCHLORIDE 50 MG/ML
12.5 INJECTION, SOLUTION INTRAMUSCULAR; INTRAVENOUS AS NEEDED
Status: CANCELLED | OUTPATIENT
Start: 2023-02-07 | End: 2023-02-07

## 2023-02-07 RX ORDER — SODIUM CHLORIDE 0.9 % (FLUSH) 0.9 %
5-40 SYRINGE (ML) INJECTION EVERY 8 HOURS
Status: DISCONTINUED | OUTPATIENT
Start: 2023-02-07 | End: 2023-02-15 | Stop reason: HOSPADM

## 2023-02-07 RX ORDER — LANOLIN ALCOHOL/MO/W.PET/CERES
400 CREAM (GRAM) TOPICAL 2 TIMES DAILY
Status: DISCONTINUED | OUTPATIENT
Start: 2023-02-08 | End: 2023-02-15 | Stop reason: HOSPADM

## 2023-02-07 RX ORDER — SODIUM CHLORIDE 9 MG/ML
250 INJECTION, SOLUTION INTRAVENOUS AS NEEDED
Status: DISCONTINUED | OUTPATIENT
Start: 2023-02-07 | End: 2023-02-07

## 2023-02-07 RX ORDER — ACETAMINOPHEN 500 MG
1000 TABLET ORAL
Status: DISCONTINUED | OUTPATIENT
Start: 2023-02-07 | End: 2023-02-15 | Stop reason: HOSPADM

## 2023-02-07 RX ORDER — PROPOFOL 10 MG/ML
INJECTION, EMULSION INTRAVENOUS AS NEEDED
Status: DISCONTINUED | OUTPATIENT
Start: 2023-02-07 | End: 2023-02-07 | Stop reason: HOSPADM

## 2023-02-07 RX ORDER — PHENYLEPHRINE HCL IN 0.9% NACL 30MG/250ML
10-100 PLASTIC BAG, INJECTION (ML) INTRAVENOUS
Status: DISCONTINUED | OUTPATIENT
Start: 2023-02-07 | End: 2023-02-07 | Stop reason: SDUPTHER

## 2023-02-07 RX ORDER — SODIUM CHLORIDE 0.9 % (FLUSH) 0.9 %
5-40 SYRINGE (ML) INJECTION AS NEEDED
Status: DISCONTINUED | OUTPATIENT
Start: 2023-02-07 | End: 2023-02-15 | Stop reason: HOSPADM

## 2023-02-07 RX ORDER — SODIUM CHLORIDE 0.9 % (FLUSH) 0.9 %
5-40 SYRINGE (ML) INJECTION EVERY 8 HOURS
Status: CANCELLED | OUTPATIENT
Start: 2023-02-07

## 2023-02-07 RX ORDER — FENTANYL CITRATE 50 UG/ML
50 INJECTION, SOLUTION INTRAMUSCULAR; INTRAVENOUS AS NEEDED
Status: DISCONTINUED | OUTPATIENT
Start: 2023-02-07 | End: 2023-02-07 | Stop reason: HOSPADM

## 2023-02-07 RX ORDER — AMOXICILLIN 250 MG
1 CAPSULE ORAL DAILY
Status: DISCONTINUED | OUTPATIENT
Start: 2023-02-08 | End: 2023-02-15

## 2023-02-07 RX ORDER — ACETAMINOPHEN 325 MG/1
650 TABLET ORAL ONCE
Status: DISCONTINUED | OUTPATIENT
Start: 2023-02-07 | End: 2023-02-07 | Stop reason: HOSPADM

## 2023-02-07 RX ORDER — MIDAZOLAM HYDROCHLORIDE 1 MG/ML
1 INJECTION, SOLUTION INTRAMUSCULAR; INTRAVENOUS
Status: DISCONTINUED | OUTPATIENT
Start: 2023-02-07 | End: 2023-02-10

## 2023-02-07 RX ORDER — PROTAMINE SULFATE 10 MG/ML
INJECTION, SOLUTION INTRAVENOUS AS NEEDED
Status: DISCONTINUED | OUTPATIENT
Start: 2023-02-07 | End: 2023-02-07 | Stop reason: HOSPADM

## 2023-02-07 RX ORDER — DIPHENHYDRAMINE HCL 25 MG
25 CAPSULE ORAL
Status: DISCONTINUED | OUTPATIENT
Start: 2023-02-07 | End: 2023-02-15 | Stop reason: HOSPADM

## 2023-02-07 RX ORDER — MORPHINE SULFATE 2 MG/ML
2 INJECTION, SOLUTION INTRAMUSCULAR; INTRAVENOUS
Status: CANCELLED | OUTPATIENT
Start: 2023-02-07

## 2023-02-07 RX ORDER — SODIUM CHLORIDE 0.9 % (FLUSH) 0.9 %
5-40 SYRINGE (ML) INJECTION AS NEEDED
Status: DISCONTINUED | OUTPATIENT
Start: 2023-02-07 | End: 2023-02-07 | Stop reason: HOSPADM

## 2023-02-07 RX ORDER — ONDANSETRON 2 MG/ML
4 INJECTION INTRAMUSCULAR; INTRAVENOUS AS NEEDED
Status: CANCELLED | OUTPATIENT
Start: 2023-02-07

## 2023-02-07 RX ORDER — SODIUM CHLORIDE, SODIUM LACTATE, POTASSIUM CHLORIDE, CALCIUM CHLORIDE 600; 310; 30; 20 MG/100ML; MG/100ML; MG/100ML; MG/100ML
100 INJECTION, SOLUTION INTRAVENOUS CONTINUOUS
Status: CANCELLED | OUTPATIENT
Start: 2023-02-07

## 2023-02-07 RX ORDER — ROCURONIUM BROMIDE 10 MG/ML
INJECTION, SOLUTION INTRAVENOUS AS NEEDED
Status: DISCONTINUED | OUTPATIENT
Start: 2023-02-07 | End: 2023-02-07 | Stop reason: HOSPADM

## 2023-02-07 RX ORDER — LIDOCAINE HYDROCHLORIDE 10 MG/ML
0.1 INJECTION, SOLUTION EPIDURAL; INFILTRATION; INTRACAUDAL; PERINEURAL AS NEEDED
Status: DISCONTINUED | OUTPATIENT
Start: 2023-02-07 | End: 2023-02-07 | Stop reason: HOSPADM

## 2023-02-07 RX ORDER — NALOXONE HYDROCHLORIDE 0.4 MG/ML
0.4 INJECTION, SOLUTION INTRAMUSCULAR; INTRAVENOUS; SUBCUTANEOUS AS NEEDED
Status: DISCONTINUED | OUTPATIENT
Start: 2023-02-07 | End: 2023-02-15 | Stop reason: HOSPADM

## 2023-02-07 RX ORDER — AMIODARONE HYDROCHLORIDE 200 MG/1
400 TABLET ORAL 2 TIMES DAILY
Status: DISCONTINUED | OUTPATIENT
Start: 2023-02-08 | End: 2023-02-15 | Stop reason: HOSPADM

## 2023-02-07 RX ORDER — MANNITOL 250 MG/ML
INJECTION, SOLUTION INTRAVENOUS AS NEEDED
Status: DISCONTINUED | OUTPATIENT
Start: 2023-02-07 | End: 2023-02-07 | Stop reason: HOSPADM

## 2023-02-07 RX ORDER — ALBUTEROL SULFATE 0.83 MG/ML
2.5 SOLUTION RESPIRATORY (INHALATION)
Status: DISCONTINUED | OUTPATIENT
Start: 2023-02-07 | End: 2023-02-15 | Stop reason: HOSPADM

## 2023-02-07 RX ORDER — MONTELUKAST SODIUM 10 MG/1
10 TABLET ORAL
Status: DISCONTINUED | OUTPATIENT
Start: 2023-02-07 | End: 2023-02-15 | Stop reason: HOSPADM

## 2023-02-07 RX ORDER — SODIUM CHLORIDE 9 MG/ML
INJECTION, SOLUTION INTRAVENOUS
Status: DISCONTINUED | OUTPATIENT
Start: 2023-02-07 | End: 2023-02-07 | Stop reason: HOSPADM

## 2023-02-07 RX ORDER — POTASSIUM CHLORIDE 29.8 MG/ML
20 INJECTION INTRAVENOUS
Status: COMPLETED | OUTPATIENT
Start: 2023-02-07 | End: 2023-02-07

## 2023-02-07 RX ORDER — HYDROMORPHONE HYDROCHLORIDE 1 MG/ML
0.5 INJECTION, SOLUTION INTRAMUSCULAR; INTRAVENOUS; SUBCUTANEOUS
Status: CANCELLED | OUTPATIENT
Start: 2023-02-07

## 2023-02-07 RX ORDER — MUPIROCIN 20 MG/G
OINTMENT TOPICAL 2 TIMES DAILY
Status: COMPLETED | OUTPATIENT
Start: 2023-02-07 | End: 2023-02-12

## 2023-02-07 RX ORDER — ATORVASTATIN CALCIUM 40 MG/1
40 TABLET, FILM COATED ORAL
Status: DISCONTINUED | OUTPATIENT
Start: 2023-02-08 | End: 2023-02-15 | Stop reason: HOSPADM

## 2023-02-07 RX ORDER — CHLORHEXIDINE GLUCONATE 1.2 MG/ML
10 RINSE ORAL 2 TIMES DAILY
Status: DISCONTINUED | OUTPATIENT
Start: 2023-02-07 | End: 2023-02-15

## 2023-02-07 RX ORDER — CEFAZOLIN SODIUM 1 G/3ML
INJECTION, POWDER, FOR SOLUTION INTRAMUSCULAR; INTRAVENOUS AS NEEDED
Status: DISCONTINUED | OUTPATIENT
Start: 2023-02-07 | End: 2023-02-07 | Stop reason: HOSPADM

## 2023-02-07 RX ORDER — LANOLIN ALCOHOL/MO/W.PET/CERES
3-6 CREAM (GRAM) TOPICAL
Status: DISCONTINUED | OUTPATIENT
Start: 2023-02-07 | End: 2023-02-15 | Stop reason: HOSPADM

## 2023-02-07 RX ADMIN — AMIODARONE HYDROCHLORIDE 1 MG/MIN: 50 INJECTION, SOLUTION INTRAVENOUS at 18:01

## 2023-02-07 RX ADMIN — Medication 100 MCG: at 09:11

## 2023-02-07 RX ADMIN — DESMOPRESSIN ACETATE 34 MCG: 4 INJECTION, SOLUTION INTRAVENOUS; SUBCUTANEOUS at 13:20

## 2023-02-07 RX ADMIN — MONTELUKAST 10 MG: 10 TABLET, FILM COATED ORAL at 23:14

## 2023-02-07 RX ADMIN — EPINEPHRINE 1 MCG/MIN: 1 INJECTION INTRAMUSCULAR; INTRAVENOUS; SUBCUTANEOUS at 16:22

## 2023-02-07 RX ADMIN — POTASSIUM CHLORIDE 20 MEQ: 29.8 INJECTION, SOLUTION INTRAVENOUS at 20:28

## 2023-02-07 RX ADMIN — ALBUMIN (HUMAN) 250 ML: 12.5 INJECTION, SOLUTION INTRAVENOUS at 14:01

## 2023-02-07 RX ADMIN — MUPIROCIN: 20 OINTMENT TOPICAL at 17:44

## 2023-02-07 RX ADMIN — ROCURONIUM BROMIDE 70 MG: 10 INJECTION INTRAVENOUS at 07:44

## 2023-02-07 RX ADMIN — WATER 2 G: 1 INJECTION INTRAMUSCULAR; INTRAVENOUS; SUBCUTANEOUS at 08:05

## 2023-02-07 RX ADMIN — SODIUM BICARBONATE 100 MEQ: 84 INJECTION INTRAVENOUS at 21:57

## 2023-02-07 RX ADMIN — PROPOFOL 150 MG: 10 INJECTION, EMULSION INTRAVENOUS at 07:44

## 2023-02-07 RX ADMIN — Medication 100 MCG: at 09:33

## 2023-02-07 RX ADMIN — PROPOFOL 10 MCG/KG/MIN: 10 INJECTION, EMULSION INTRAVENOUS at 21:08

## 2023-02-07 RX ADMIN — POTASSIUM CHLORIDE 20 MEQ: 29.8 INJECTION, SOLUTION INTRAVENOUS at 21:58

## 2023-02-07 RX ADMIN — CALCIUM CHLORIDE 1 G: 100 INJECTION, SOLUTION INTRAVENOUS; INTRAVENTRICULAR at 13:28

## 2023-02-07 RX ADMIN — PROTAMINE SULFATE 50 MG: 10 INJECTION, SOLUTION INTRAVENOUS at 13:34

## 2023-02-07 RX ADMIN — MIDAZOLAM HYDROCHLORIDE 4 MG: 1 INJECTION, SOLUTION INTRAMUSCULAR; INTRAVENOUS at 07:30

## 2023-02-07 RX ADMIN — Medication 1.9 UNITS/HR: at 08:33

## 2023-02-07 RX ADMIN — PROTHROMBIN, COAGULATION FACTOR VII HUMAN, COAGULATION FACTOR IX HUMAN, COAGULATION FACTOR X HUMAN, PROTEIN C, PROTEIN S HUMAN, AND WATER 1039 UNITS: KIT at 13:38

## 2023-02-07 RX ADMIN — ALBUTEROL SULFATE 2.5 MG: 2.5 SOLUTION RESPIRATORY (INHALATION) at 22:47

## 2023-02-07 RX ADMIN — SODIUM CHLORIDE 9 ML/HR: 9 INJECTION, SOLUTION INTRAVENOUS at 16:12

## 2023-02-07 RX ADMIN — FENTANYL CITRATE 50 MCG: 50 INJECTION, SOLUTION INTRAMUSCULAR; INTRAVENOUS at 07:30

## 2023-02-07 RX ADMIN — PROTAMINE SULFATE 50 MG: 10 INJECTION, SOLUTION INTRAVENOUS at 18:19

## 2023-02-07 RX ADMIN — SODIUM CHLORIDE: 900 INJECTION, SOLUTION INTRAVENOUS at 07:41

## 2023-02-07 RX ADMIN — ALBUMIN (HUMAN) 12.5 G: 12.5 INJECTION, SOLUTION INTRAVENOUS at 22:35

## 2023-02-07 RX ADMIN — DOBUTAMINE HYDROCHLORIDE 2 MCG/KG/MIN: 200 INJECTION INTRAVENOUS at 19:18

## 2023-02-07 RX ADMIN — SODIUM CHLORIDE 10 G/HR: 9 INJECTION, SOLUTION INTRAVENOUS at 08:15

## 2023-02-07 RX ADMIN — DEXMEDETOMIDINE HYDROCHLORIDE 0.6 MCG/KG/HR: 4 INJECTION, SOLUTION INTRAVENOUS at 18:47

## 2023-02-07 RX ADMIN — PROPOFOL 200 MG: 10 INJECTION, EMULSION INTRAVENOUS at 10:42

## 2023-02-07 RX ADMIN — HYDROCORTISONE SODIUM SUCCINATE 200 MG: 100 INJECTION, POWDER, FOR SOLUTION INTRAMUSCULAR; INTRAVENOUS at 10:22

## 2023-02-07 RX ADMIN — DEXMEDETOMIDINE HYDROCHLORIDE 0.6 MCG/KG/HR: 100 INJECTION, SOLUTION, CONCENTRATE INTRAVENOUS at 12:15

## 2023-02-07 RX ADMIN — SODIUM CHLORIDE 20 MCG/MIN: 9 INJECTION, SOLUTION INTRAVENOUS at 08:00

## 2023-02-07 RX ADMIN — WATER 2 G: 1 INJECTION INTRAMUSCULAR; INTRAVENOUS; SUBCUTANEOUS at 12:05

## 2023-02-07 RX ADMIN — HYDROMORPHONE HYDROCHLORIDE 0.5 MG: 1 INJECTION, SOLUTION INTRAMUSCULAR; INTRAVENOUS; SUBCUTANEOUS at 20:11

## 2023-02-07 RX ADMIN — LIDOCAINE HYDROCHLORIDE 80 MG: 20 INJECTION, SOLUTION EPIDURAL; INFILTRATION; INTRACAUDAL; PERINEURAL at 07:44

## 2023-02-07 RX ADMIN — SODIUM CHLORIDE 3 MCG/MIN: 9 INJECTION, SOLUTION INTRAVENOUS at 12:48

## 2023-02-07 RX ADMIN — MANNITOL 12.5 G: 12.5 INJECTION, SOLUTION INTRAVENOUS at 10:42

## 2023-02-07 RX ADMIN — ROCURONIUM BROMIDE 30 MG: 10 INJECTION INTRAVENOUS at 09:18

## 2023-02-07 RX ADMIN — Medication 100 MCG: at 09:17

## 2023-02-07 RX ADMIN — SODIUM CHLORIDE, PRESERVATIVE FREE 10 ML: 5 INJECTION INTRAVENOUS at 23:14

## 2023-02-07 RX ADMIN — SODIUM CHLORIDE, POTASSIUM CHLORIDE, SODIUM LACTATE AND CALCIUM CHLORIDE: 600; 310; 30; 20 INJECTION, SOLUTION INTRAVENOUS at 07:15

## 2023-02-07 RX ADMIN — WATER 2 G: 1 INJECTION INTRAMUSCULAR; INTRAVENOUS; SUBCUTANEOUS at 17:39

## 2023-02-07 RX ADMIN — POTASSIUM CHLORIDE 20 MEQ: 29.8 INJECTION, SOLUTION INTRAVENOUS at 17:43

## 2023-02-07 RX ADMIN — AMIODARONE HYDROCHLORIDE 1 MG/MIN: 50 INJECTION, SOLUTION INTRAVENOUS at 12:22

## 2023-02-07 RX ADMIN — PROTAMINE SULFATE 350 MG: 10 INJECTION, SOLUTION INTRAVENOUS at 12:57

## 2023-02-07 RX ADMIN — ALBUMIN (HUMAN) 250 ML: 12.5 INJECTION, SOLUTION INTRAVENOUS at 13:47

## 2023-02-07 RX ADMIN — Medication: at 11:00

## 2023-02-07 RX ADMIN — ACETAMINOPHEN 1000 MG: 500 TABLET, FILM COATED ORAL at 17:44

## 2023-02-07 RX ADMIN — HEPARIN SODIUM 30000 UNITS: 1000 INJECTION, SOLUTION INTRAVENOUS; SUBCUTANEOUS at 09:35

## 2023-02-07 RX ADMIN — HYDROMORPHONE HYDROCHLORIDE 0.5 MG: 2 INJECTION, SOLUTION INTRAMUSCULAR; INTRAVENOUS; SUBCUTANEOUS at 09:17

## 2023-02-07 RX ADMIN — FENTANYL CITRATE 250 MCG: 50 INJECTION, SOLUTION INTRAMUSCULAR; INTRAVENOUS at 07:44

## 2023-02-07 RX ADMIN — PROTHROMBIN, COAGULATION FACTOR VII HUMAN, COAGULATION FACTOR IX HUMAN, COAGULATION FACTOR X HUMAN, PROTEIN C, PROTEIN S HUMAN, AND WATER 1039 UNITS: KIT at 14:09

## 2023-02-07 RX ADMIN — SODIUM CHLORIDE 200 MCG: 9 INJECTION, SOLUTION INTRAVENOUS at 07:44

## 2023-02-07 RX ADMIN — SODIUM CHLORIDE 120 MCG: 9 INJECTION, SOLUTION INTRAVENOUS at 07:46

## 2023-02-07 RX ADMIN — SODIUM CHLORIDE 2 MG/HR: 9 INJECTION, SOLUTION INTRAVENOUS at 22:09

## 2023-02-07 RX ADMIN — FENTANYL CITRATE 100 MCG/HR: 0.05 INJECTION, SOLUTION INTRAMUSCULAR; INTRAVENOUS at 07:44

## 2023-02-07 RX ADMIN — CHLORHEXIDINE GLUCONATE 10 ML: 1.2 RINSE ORAL at 17:44

## 2023-02-07 NOTE — PROGRESS NOTES
1515- TRANSFER - IN REPORT:    Verbal report received from OR/ NP (name) on [employer]  being received from OR (unit) for routine post - op      Report consisted of patients Situation, Background, Assessment and   Recommendations(SBAR). Information from the following report(s) SBAR, Kardex, OR Summary, Procedure Summary, Intake/Output, Med Rec Status, Cardiac Rhythm SR/ V Paced, and Procedure Verification was reviewed with the receiving nurse. Opportunity for questions and clarification was provided. Assessment completed upon patients arrival to unit and care assumed. Patient arrived on the following settings-   Vent- IMV- 14, 380, 80, 5  Gtts: - Insulin, Precedex, and Amiodarone    PA temp 94.2 on arrival- judah hugger placed on 43 C    Verbal order to keep SBP  and MAP greater than 70 and keep patient intubated and sedated overnight. 65- SK NP/ AD MD at bedside for CI of 0.9. Verbal order to give 500cc of LR. ABG- 7.263, 47.5, 89.4, 21.5, -5.8, 95.3. Lactic 2.87, ICal- 1.26. MD adjust PEEP to 8 and RR to 16    1640- Dr. Tito Adhikari at bedside. Updates given. MD agrees with Epi. Verbal order for 50mg IV Protamine. MD states to NOT initiate any SAT/ SBT in the morning and wait until MD rounds. 1710- Discussed fibrinogen level with Dr. Claudia Gordon. Verbal orders to give 1u cryo. Dr Tito Adhikari at bedside. Aware of CI of 1.1. MD states to increase Epi and add cardene if necessary    1735- ABG- 7.29, 42, 126.2, 20.1, -6.3, 98.4, iCal- 1.23, Lactic- 2.72. Intensivist notified. 1900- Discussed continued CI of 1.3 with Dr. Claudia Gordon despite being on 6mg of Epi. Verbal order to start Dobutamine at 2mcg. 2000- Bedside and Verbal shift change report given to MARCY RN (oncoming nurse) by Kingston Chin RN (offgoing nurse). Report given with SBAR, Kardex, Intake/Output and MAR. Notified Dr. Tito Adhikari of Lactic Acid of 4.1, CT output for the last hour of 110, Plts of 80, and continued low CI.  No new orders.

## 2023-02-07 NOTE — ANESTHESIA PROCEDURE NOTES
SHIVANI        Procedure Details: probe placement, image aquisition & interpretation    Site marked  Risks and benefits discussed with the patient and plans are to proceed    Procedure Note    Performed by: Aleida Quintero MD  Authorized by: Aleida Quintero MD     Indications: assessment of ascending aorta and assessment of surgical repair  Modalities: 2D, CF, CWD, contrast, PWD  Probe Type: multiplane  Insertion: atraumatic  Patient Status: intubated  Echocardiographic and Doppler Measurements   Aorta  Size  Diam(cm)  Dissection PlaqueThick(mm)  Plaque Mobile    Ascending dilated 4.2 No >3 No    Arch normal  No 0-3 No    Descending normal  No >3 No          Valves  Annulus  Stenosis  Area/Grad  Regurg  Leaflet   Morph  Leaflet   Motion    Aortic bioprosthetic none 0.6,58/28 2+ thickened restricted    Mitral dilated   2+ thickened normal    Tricuspid normal none  2+ normal normal          Atria  Size  SEC (smoke)  Thrombus  Tumor  Device    Rt Atrium normal No No No No    Lt Atrium dilated No No No No     Interatrial Septum Morphology: normal    Interventricular Septum Morphology: normal  Ventricle  Cavity Size  Cavity Dimension Hypertrophy  Thrombus  Gloal FXN  EF    RV normal  No no normal     LV normal  Yes No mildly impaired 40       Regional Function  (1 = normal, 2 = mildly hypokinetic, 3 = severely hypokinetic, 4 = akinetic, 5 = dyskinetic) LAV - Long Doss View   ME LAV = 0  ME LAV = 90  ME LAV = 130   Basal Sept:2 Basal Ant:1 Basal Post:1   Mid Sept:2 Mid Ant:1 Mid Post:1   Apical Sept:2 Apical Ant:1 Basal Ant Sept:1   Basal Lat:1 Basal Inf:1 Mid Ant Sept:2   Mid Lat:1 Mid Inf:1    Apical Lat:1 Apical Inf:1      Diastolic function: Stage 2 diastolic dysfunction  Pericardium: normal    Post Intervention Follow-up Study  Ventricular Global Function: improved  Ventricular Regional Function: improved     Valve  Function  Regurgitation  Area    Aortic no change      Mitral no change      Tricuspid no change Prosthetic        Complications: None  Comments: Pre SHIVANI  SHIVANI views are distorted as the hear is rotated. LV 35-40%, LVH , septal and inf walls down. RV normal.  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 Post SHIVANI  LV fn preserved, ef 50%. RV normal  Bioprosthetic AV in situ, no AI, NO PVL, asc aorta repaired by tube graft. Mean gradient 6 mm across the AV. MR 2+,   LV fn came down to pre op level of ef 35%, same wall motion problems as before.

## 2023-02-07 NOTE — ANESTHESIA PROCEDURE NOTES
Arterial Line Placement    Start time: 2/7/2023 7:06 AM  End time: 2/7/2023 7:16 AM  Performed by: Matthew Gordon MD  Authorized by: Matthew Gordon MD     Pre-Procedure  Indications:  Arterial pressure monitoring and blood sampling  Preanesthetic Checklist: patient identified, risks and benefits discussed, anesthesia consent, site marked, patient being monitored, timeout performed and patient being monitored      Procedure:   Prep:  Chlorhexidine  Seldinger Technique?: Yes    Orientation:  Right  Location:  Radial artery  Catheter size:  20 G  Number of attempts:  1    Assessment:   Post-procedure:  Line secured and sterile dressing applied  Patient Tolerance:  Patient tolerated the procedure well with no immediate complications

## 2023-02-07 NOTE — BRIEF OP NOTE
Brief Postoperative Note    Patient: Eddie Altman  YOB: 1950  MRN: [id number]    Date of Procedure: 2/7/2023     BRIEF OP NOTE  Pre-Op Diagnosis: AORTIC ANEURYSM    Post-Op Diagnosis: AORTIC ANEURYSM      Procedure: Procedure(s):  RIGHT FEMORAL CUTDOWN; RIGHT FEMORAL ARTERIAL CANNULATION,  RE-DO STERNOTOMY;  HYPOTHERMIC ARREST, ANTEGRADE CEREBRAL PERFUSION VIA INNOMINATE ARTERY, REDO AORTIC VALVE REPLACEMENT (AVR) WITH 21MM INSPIRIS BIOPROSTHETIC VALVE; ASCENDING AORTIC ANEURYSM REPAIR WITH 28MM HEMASHIELD TUBE GRAFT; REPAIR AORTIC TEAR, PULMONARY ARTERY REPAIR, RIGHT ATRIAL REPAIR, ECC; SHIVANI & EPIAORTIC US BY DR. [facility]    Surgeon: Elvia Mcgill MD    Assistant(s): KASSIE Tafoya    Anesthesia: General     Infusions: Phenylphrine, Amiodarone, precedex, insulin,     Estimated Blood Loss: 4500    Cell Saver: 4200    Hypothermic antegrade cerebral perfusion: 15 minutes    Hypothermic Arrest time: 19 minutes    CPB: 191    Cross clamp time: 139    Specimens:   ID Type Source Tests Collected by Time Destination   1 : Explanted Bioprosthetic Aortic Valve Fresh Aortic Valve  Serjio Keith MD 2/7/2023 1030 Pathology   2 : Portion of Ascending Aorta Fresh Aorta  Serjio Keith MD 2/7/2023 1033 Pathology       Drains and pacing wires: 1 bipolar ventricular wire, 4 chaim drains    Findings: See full operative note. Implants:   Implant Name Type Inv.  Item Serial No.  Lot No. LRB No. Used Action   VALVE AORT 21MM REPL RESILIENT BOV PERICARD CO CHROMIUM ALLY - [id number]  VALVE AORT 21MM REPL RESILIENT BOV PERICARD CO CHROMIUM ALLY [id number] [employer] NA N/A 1 Implanted   GRAFT VASC PLAT [id number] -- HEMSHLD - [id number]  GRAFT VASC PLAT [id number] -- Laved[employer] [id number] [employer] CHAOT CARDIOVASCLR [id number] N/A 1 Implanted            Electronically Signed by KASSIE Tafoya on 2/7/2023 at 2:44 PM

## 2023-02-07 NOTE — ANESTHESIA PROCEDURE NOTES
Central Line Placement    Start time: 2/7/2023 7:16 AM  End time: 2/7/2023 7:29 AM  Performed by: Damaso Farah MD  Authorized by: Damaso Farah MD     Indications: vascular access, central pressure monitoring and need for vasopressors  Preanesthetic Checklist: patient identified, risks and benefits discussed, anesthesia consent, site marked, patient being monitored, timeout performed and fire risk safety assessment completed and verbalized      Pre-procedure: All elements of maximal sterile barrier technique followed? Yes    2% Chlorhexidine for cutaneous antisepsis, Hand hygiene performed prior to catheter insertion and Ultrasound guidance    Sterile Ultrasound Technique followed?: Yes            Procedure:   Prep:  Chlorhexidine    Orientation:  Right    Catheter type:  Triple lumen  Catheter size:  9 Fr  Catheter length:  16 cm  Number of attempts:  1  Successful placement: Yes      Assessment:   Post-procedure:  Catheter secured, sterile dressing applied and sterile dressing with CHG applied  Assessment:  Blood return through all ports and free fluid flow  Insertion:  Uncomplicated  Patient tolerance:  Patient tolerated the procedure well with no immediate complications  PA catheter inserted .

## 2023-02-07 NOTE — CONSULTS
CRITICAL CARE ADMISSION NOTE      Name: Marta Dockery   : 1950   MRN: [id number]   Date: 2023      Reason for ICU Admission: s/p redo AVR and aortic repair     ICU PROBLEM LIST   Severe AS s/p redo AVR and aortic repair   Acute hypoxemic, hypercapnic respiratory failure  Acute on chronic HFrEF  Post op CHB S/p PPM  COPD    HISTORY OF PRESENT ILLNESS:   Pt is a 72 y.o F w/ PMH as noted below who presents to CVICU post planned redo AVR and aortic repair. Per report procedure technically difficult with significant adhesion and friability of tissue with associated significant blood loss. Given DDAVP, Kcentra, protamine and blood product as noted below. Intra op SHIVANI w/ EF 35%. V wire x1, Tyler x4 (R, L pleural, mediastinal x2)    CPB: 191  Cross clamp 139    EBL: 4500ml  Cell saver 4200ml  Blood product: 6u pRBC, 3 plt, 2 FFP, 2 cryo      24 HOUR EVENTS:   Arrived to CVICU sedated intubated, on precedex, amio and insulin. Paced rhythm from underlying PPM. Started on epi ggt due to decreased CI. Post op CXR w/ LLL atelectasis vs congestion, otherwise expected post op changes.     NEUROLOGICAL:    Maintain sedated overnight  Propofol, precedex  KAVON in AM  PRN pain regimen    PULMONOLOGY:   Lung protective ventilation  SBT in AM  IS, PFV, OOBTC once extubated    CARDIOVASCULAR:   Epi gtt, for RV support  CI >2, MAP >65,   IF high SVR and SBP, may switch to dobutamine   Cardene PRN for afterload reduction, SBP <130  Pacer wire and chest tube management per CTS  Monitor drain output  Telemetry monitoring    GASTROINTESTINAL:   Pepcid  NPO     RENAL/ELECTROLYTE/FLUIDS:   Strict I/Os  RFP  Replace lytes PRN    ENDOCRINE:   Insulin gtt per protocol  Glucose 120-180    HEMATOLOGY/ONCOLOGY:   Monitor coags, hgb goal >7  SCDs    ID/MICRO:   Periop ancef    ICU DAILY CHECKLIST     Code Status:full  DVT Prophylaxis:SCDs  T/L/D: ETT, PIV, MAC/PAC, V wire, Tyler x4, sonia kaufman  SUP: pepcid  Diet: NPO  Activity Level:ad gianni  ABCDEF Bundle/Checklist Completed:Yes  Disposition: Stay in ICU  Multidisciplinary Rounds Completed:  Yes  Patient/Family Updated: MattyYessi Diazaletacheng 50:   As above    Review of Systems:     Review of Systems   Unable to perform ROS: Intubated      Past Medical History:      has a past medical history of Adverse effect of anesthesia, Aneurysm ([facility] (02/2023), Aortic stenosis (05/04/2010), Arthritis, Asthma (05/04/2010), Congestive heart failure ([facility] Elevated cholesterol (05/04/2010), Ex-smoker (05/04/2010), Family history of diabetes mellitus (DM) (05/04/2010), Family history of early CAD (05/04/2010), GERD (gastroesophageal reflux disease), Heart murmur, Hypertension, Ill-defined condition, Osteopenia (05/04/2010), [facility] (01/31/2023), Uterine fibroid (05/04/2010), and Vitamin D deficiency (05/10/2010). Past Surgical History:      has a past surgical history that includes endoscopy, colon, diagnostic (06/01/2004); hx gyn (10/01/2001); pr unlisted procedure breast (01/01/1978); hx breast biopsy (06/01/2006); pr unlisted procedure cardiac surgery (01/01/2014); hx gi; hx aortic valve replacement (2014); hx rotator cuff repair (Left, 2021); and hx pacemaker (07/18/2014). Home Medications:     Prior to Admission medications    Medication Sig Start Date End Date Taking? Authorizing Provider   glucosamine 1,000 mg tab Take 1 Tablet by mouth daily. Yes Provider, Historical   chlorhexidine (PERIDEX) 0.12 % solution 15 mL by Swish and Spit route every twelve (12) hours. 2/5/23  Yes Gris Awad NP   mupirocin (BACTROBAN) 2 % ointment by Both Nostrils route two (2) times a day. 2/5/23  Yes Gris Awad NP   aspirin 81 mg chewable tablet Take 81 mg by mouth daily. Yes Provider, Historical   calcium-vits G0-U-G9-minerals 166.75 mg- 166.75 unit cap Take 1 Tablet by mouth daily.    Yes Provider, Historical   TURMERIC PO turmeric   Yes Provider, Historical   omeprazole (PRILOSEC) 40 mg capsule Take 40 mg by mouth Daily (before breakfast). 21  Yes Provider, Historical   oxybutynin chloride XL (DITROPAN XL) 10 mg CR tablet Take 10 mg by mouth nightly. 21  Yes Provider, Historical   montelukast (SINGULAIR) 10 mg tablet TAKE 1 TABLET BY MOUTH EVERY DAY  Patient taking differently: Take 10 mg by mouth nightly. 13  Yes Vesna Fong MD   atorvastatin (LIPITOR) 40 mg tablet Take 1 Tab by mouth daily. Patient taking differently: Take 40 mg by mouth nightly. 12  Yes Vesna Fong MD   MULTI-VITAMIN PO Take 1 Tablet by mouth daily. Yes Provider, Historical   losartan (COZAAR) 100 mg tablet Take 50 mg by mouth nightly. Provider, Historical       Allergies/Social/Family History: Allergies   Allergen Reactions    Morphine Other (comments)     Cold sweats & nightmares      Social History     Tobacco Use    Smoking status: Former     Packs/day: 0.25     Years: 30.00     Pack years: 7.50     Types: Cigarettes     Quit date: 10/1/2002     Years since quittin.3    Smokeless tobacco: Never    Tobacco comments:     on and off for 30 years   Substance Use Topics    Alcohol use:  Yes     Alcohol/week: 0.8 standard drinks     Types: 1 Glasses of wine per week     Comment: maybe 1 glass/week      Family History   Problem Relation Age of Onset    Alcohol abuse Mother     Heart Disease Mother     Hypertension Mother     Elevated Lipids Mother     Alcohol abuse Father     Heart Disease Father     Diabetes Father     Elevated Lipids Father     Hypertension Father     Heart Disease Brother 46        MI, PTCA/stent    Alcohol abuse Brother     Heart Disease Maternal Aunt     Heart Disease Maternal Aunt     Heart Disease Maternal Aunt     Heart Disease Maternal Aunt     Heart Disease Maternal Uncle     Cancer Paternal Aunt         uterine    Heart Disease Paternal Aunt     Cancer Paternal Uncle         lung    Cancer Daughter         skin Alcohol abuse Son     Anesth Problems Neg Hx          OBJECTIVE:     Labs and Data: Reviewed 23  Medications: Reviewed 23  Imaging: Reviewed 23    Physical Exam  Vitals and nursing note reviewed. Constitutional:       Appearance: Normal appearance. She is obese. Comments: sedated   HENT:      Head: Normocephalic and atraumatic. Nose: Nose normal. No congestion. Mouth/Throat:      Mouth: Mucous membranes are moist.      Pharynx: Oropharynx is clear. No oropharyngeal exudate. Comments: ETT  Eyes:      General: No scleral icterus. Extraocular Movements: Extraocular movements intact. Conjunctiva/sclera: Conjunctivae normal.      Pupils: Pupils are equal, round, and reactive to light. Neck:      Comments: [facility] PAC  Cardiovascular:      Rate and Rhythm: Normal rate. Pulses: Normal pulses. Heart sounds: Normal heart sounds. No murmur heard. No gallop. Comments: PPM pocket benign, V wire, mediastinal drain x2, post sternotomy, site C/D/I  Pulmonary:      Effort: Pulmonary effort is normal. No respiratory distress. Breath sounds: Normal breath sounds. No wheezing or rales. Comments: On MV, bilateral pleural chest tubes  Abdominal:      Tenderness: There is no abdominal tenderness. There is no guarding. Comments: obese   Musculoskeletal:         General: Normal range of motion. Cervical back: Normal range of motion. No rigidity. Right lower leg: No edema. Left lower leg: No edema. Skin:     General: Skin is warm and dry. Capillary Refill: Capillary refill takes less than 2 seconds. Coloration: Skin is not jaundiced.    Neurological:      Comments: sedated   Psychiatric:      Comments: sedated        Visit Vitals  /81   Pulse 80   Temp 98.1 °F (36.7 °C)   Resp 14   Ht 5' (1.524 m)   Wt 77.2 kg (170 lb 3.1 oz)   SpO2 96%   BMI 33.24 kg/m²      O2 Device: Endotracheal tube, Ventilator Temp (24hrs), Av.1 °F (36.7 °C), Min:98.1 °F (36.7 °C), Max:98.1 °F (36.7 °C)           Intake/Output:     Intake/Output Summary (Last 24 hours) at 2/7/2023 1524  Last data filed at 2/7/2023 1411  Gross per 24 hour   Intake 1375 ml   Output 300 ml   Net 1075 ml       Imaging    02/07/23    OR ECHO SHIVANI INTRAOP  2/7/2023    Narrative  See Anesthesia Procedure note for procedure details. Pertinent imaging reviewed and interpreted as noted above      CRITICAL CARE DOCUMENTATION  I had a face to face encounter with the patient, reviewed and interpreted patient data including clinical events, labs, images, vital signs, I/O's, and examined patient. I have discussed the case and the plan and management of the patient's care with the consulting services, the bedside nurses and the respiratory therapist.      NOTE OF PERSONAL INVOLVEMENT IN CARE   This patient has a high probability of imminent, clinically significant deterioration, which requires the highest level of preparedness to intervene urgently. I participated in the decision-making and personally managed or directed the management of the following life and organ supporting interventions that required my frequent assessment to treat or prevent imminent deterioration. I personally spent 60 minutes of critical care time. This is time spent at this critically ill patient's bedside actively involved in patient care as well as the coordination of care. This does not include any procedural time which has been billed separately. Nathan Cortes MD  Staff [facility]

## 2023-02-07 NOTE — ANESTHESIA PREPROCEDURE EVALUATION
Relevant Problems   No relevant active problems       Anesthetic History   No history of anesthetic complications            Review of Systems / Medical History  Patient summary reviewed, nursing notes reviewed and pertinent labs reviewed    Pulmonary  Within defined limits          Asthma        Neuro/Psych   Within defined limits      TIA     Cardiovascular  Within defined limits  Hypertension  Valvular problems/murmurs: aortic stenosis        Pacemaker and PAD    Exercise tolerance: <4 METS  Comments:  Left Ventricle: Mildly reduced left ventricular systolic function with a visually estimated EF of 45 - 50%. Left ventricle size is normal. Mildly increased wall thickness. Mild global hypokinesis, that is more prominent in the septal wall, present. Grade III diastolic dysfunction with increased LAP.   Aortic Valve: Bioprosthetic valve. Mild regurgitation. Severe stenosis of the aortic valve. AV mean gradient is 45 mmHg. AV peak velocity is 4.0 m/s. LVOT:AV VTI Index is 0.25. LVOT diameter is 2.0 cm. AV area by continuity VTI is 0.7 cm2.   Mitral Valve: Mildly thickened leaflet. Mild regurgitation.   Tricuspid Valve: Normal RVSP. The estimated RVSP is 12 mmHg.   Interatrial Septum: No PFO present visible by color Doppler. Non-diagnostic bubble study due to inadequate opacification of the right-sided cardiac chambers.   Aorta: Mildly dilated sinus of Valsalva. Ao Sinus diameter is 4.2 to 4.3 cm.      GI/Hepatic/Renal  Within defined limits   GERD           Endo/Other  Within defined limits      Arthritis     Other Findings              Physical Exam    Airway  Mallampati: II  TM Distance: > 6 cm  Neck ROM: normal range of motion   Mouth opening: Normal     Cardiovascular  Regular rate and rhythm,  S1 and S2 normal,  no murmur, click, rub, or gallop        Murmur: Grade 2, Aortic area     Dental  No notable dental hx       Pulmonary  Breath sounds clear to auscultation               Abdominal  GI exam deferred       Other Findings            Anesthetic Plan    ASA: 4  Anesthesia type: general    Monitoring Plan: Arterial line, BIS, CVP, Tobias-Lilian and SHIVANI    Post procedure ventilation   Induction: Intravenous  Anesthetic plan and risks discussed with: Patient

## 2023-02-07 NOTE — PROGRESS NOTES
Occupational Therapy 2/7/2023    Orders received and chart reviewed up to date. Pt POD 0 R femoral cutdown; re-do sternotomy; AVR. Will follow-up tomorrow for OT evaluation/as appropriate.      Thank you,   Tani Lund, OTVANESSA, OTR/L

## 2023-02-07 NOTE — PROGRESS NOTES
Cardiac Surgery Coordinator- Unable to locate family, placed call to her . Provided update from the OR, he is currently eating breakfast in the cafeteria. Will continue to follow and update. 36- Met with family of Juliette Hannon, provided family with update. Family without questions or concerns at this time. Will continue to follow for educational and emotional needs. 1430- Met with Alok Jason family and Dr. Sarah Zurita. Update given, Encouraged family to verbalize and offered emotional support. Reinforced Surgical waiting room instructions, family to wait in the main surgical waiting room until contacted by the nursing staff. [id number]- Escorted family to the fourth floor waiting room, exchanged contact information. Bedside nurse aware of their location.

## 2023-02-07 NOTE — PROGRESS NOTES
Interval review of H&P completed on 2/6/23. Patient in emergency last week with neurology evaluation and cleared for surgery this am.  Recommendations for aspirin and plavix post operative. No complaints, questions answered, testing reviewed, blood consent in chart, ready for surgery.     KASSIE Chavez  02/07/23  7:20 AM

## 2023-02-07 NOTE — PROGRESS NOTES
Physical Therapy 2/7/2023    Orders received and chart reviewed up to date. Pt POD 0 R femoral cutdown; re-do sternotomy; AVR. Will follow-up tomorrow for PT evaluation/ as appropriate. Recommend with nursing patient to complete as able in order to maintain strength, endurance and independence: OOB to chair 3x/day. Thank you.   Sandip Morse, PT, DPT

## 2023-02-07 NOTE — ANESTHESIA POSTPROCEDURE EVALUATION
Procedure(s):  RIGHT FEMORAL CUTDOWN; RE-DO STERNOTOMY;  AORTIC VALVE REPLACEMENT (AVR) WITH 21MM INSPIRIS BIOPROSTHETIC VALVE; ASCENDING AORTIC ANEURYSM REPAIR WITH 28MM HEMASHIELD TUBE GRAFT; ECC; SHIVANI & EPIAORTIC US BY DR. Ramón Kidd. general    Anesthesia Post Evaluation        Patient location during evaluation: PACU  Note status: Adequate. Level of consciousness: responsive to verbal stimuli and sleepy but conscious  Pain management: satisfactory to patient  Airway patency: patent  Anesthetic complications: no  Cardiovascular status: acceptable  Respiratory status: acceptable  Hydration status: acceptable  Comments: +Post-Anesthesia Evaluation and Assessment    Patient: Yuan Mccrary MRN: [id number]  SSN: [id number]   YOB: 1950  Age: 72 y.o. Sex: female          Cardiovascular Function/Vital Signs    BP 91/62   Pulse 60   Temp (!) 34.7 °C (94.5 °F)   Resp 16   Ht 5' (1.524 m)   Wt 77.2 kg (170 lb 3.1 oz)   SpO2 93%   BMI 33.24 kg/m²     Patient is status post Procedure(s):  RIGHT FEMORAL CUTDOWN; RE-DO STERNOTOMY;  AORTIC VALVE REPLACEMENT (AVR) WITH 21MM INSPIRIS BIOPROSTHETIC VALVE; ASCENDING AORTIC ANEURYSM REPAIR WITH 28MM HEMASHIELD TUBE GRAFT; ECC; SHIVANI & EPIAORTIC US BY DR. Ramón Kidd. Nausea/Vomiting: Controlled. Postoperative hydration reviewed and adequate. Pain:  Pain Scale 1: Numeric (0 - 10) (02/07/23 2825)  Pain Intensity 1: 0 (02/07/23 4606)   Managed. Neurological Status: At baseline. Mental Status and Level of Consciousness: Arousable. Pulmonary Status:   O2 Device: Endotracheal tube;Ventilator (02/07/23 1520)   Adequate oxygenation and airway patent. Complications related to anesthesia: None    Post-anesthesia assessment completed. No concerns. I have evaluated the patient and the patient is stable and ready to be discharged from PACU .     Signed By: Lyssa Cali MD    2/7/2023        INITIAL Post-op Vital signs:   Vitals Value Taken Time BP 93/76 02/07/23 1600   Temp 34.7 °C (94.5 °F) 02/07/23 1524   Pulse 60 02/07/23 1632   Resp 18 02/07/23 1632   SpO2 96 % 02/07/23 1632   Vitals shown include unvalidated device data.

## 2023-02-08 ENCOUNTER — APPOINTMENT (OUTPATIENT)
Dept: GENERAL RADIOLOGY | Age: 73
DRG: 219 | End: 2023-02-08
Attending: NURSE PRACTITIONER
Payer: COMMERCIAL

## 2023-02-08 LAB
ABO + RH BLD: NORMAL
ACUTE KIDNEY INJURY RISK NEPHROCHECK: 0.94 (ref 0–0.3)
ACUTE KIDNEY INJURY RISK NEPHROCHECK: 1.29 (ref 0–0.3)
ADMINISTERED INITIALS, ADMINIT: NORMAL
ALBUMIN SERPL-MCNC: 3.2 G/DL (ref 3.5–5)
ALBUMIN SERPL-MCNC: 3.4 G/DL (ref 3.5–5)
ALBUMIN/GLOB SERPL: 2 (ref 1.1–2.2)
ALBUMIN/GLOB SERPL: 2.1 (ref 1.1–2.2)
ALP SERPL-CCNC: 26 U/L (ref 45–117)
ALP SERPL-CCNC: 30 U/L (ref 45–117)
ALT SERPL-CCNC: 14 U/L (ref 12–78)
ALT SERPL-CCNC: 19 U/L (ref 12–78)
ANION GAP SERPL CALC-SCNC: 6 MMOL/L (ref 5–15)
ANION GAP SERPL CALC-SCNC: 7 MMOL/L (ref 5–15)
AST SERPL-CCNC: 43 U/L (ref 15–37)
AST SERPL-CCNC: 51 U/L (ref 15–37)
ATRIAL RATE: 76 BPM
BASE DEFICIT BLD-SCNC: 1.7 MMOL/L
BASE DEFICIT BLD-SCNC: 3.2 MMOL/L
BDY SITE: ABNORMAL
BILIRUB SERPL-MCNC: 0.6 MG/DL (ref 0.2–1)
BILIRUB SERPL-MCNC: 0.8 MG/DL (ref 0.2–1)
BLD PROD TYP BPU: NORMAL
BLOOD GROUP ANTIBODIES SERPL: NORMAL
BPU ID: NORMAL
BUN SERPL-MCNC: 16 MG/DL (ref 6–20)
BUN SERPL-MCNC: 17 MG/DL (ref 6–20)
BUN/CREAT SERPL: 25 (ref 12–20)
BUN/CREAT SERPL: 28 (ref 12–20)
CA-I BLD-SCNC: 1.16 MMOL/L (ref 1.12–1.32)
CALCIUM SERPL-MCNC: 7.9 MG/DL (ref 8.5–10.1)
CALCIUM SERPL-MCNC: 8.2 MG/DL (ref 8.5–10.1)
CALCULATED R AXIS, ECG10: -67 DEGREES
CALCULATED T AXIS, ECG11: 108 DEGREES
CHLORIDE SERPL-SCNC: 110 MMOL/L (ref 97–108)
CHLORIDE SERPL-SCNC: 115 MMOL/L (ref 97–108)
CO2 SERPL-SCNC: 23 MMOL/L (ref 21–32)
CO2 SERPL-SCNC: 26 MMOL/L (ref 21–32)
COHGB MFR BLD: 0.7 % (ref 1–2)
CREAT SERPL-MCNC: 0.6 MG/DL (ref 0.55–1.02)
CREAT SERPL-MCNC: 0.64 MG/DL (ref 0.55–1.02)
CROSSMATCH RESULT,%XM: NORMAL
D50 ADMINISTERED, D50ADM: 0 ML
D50 ADMINISTERED, D50ADM: 11 ML
D50 ADMINISTERED, D50ADM: 12 ML
D50 ADMINISTERED, D50ADM: 8 ML
D50 ADMINISTERED, D50ADM: 8 ML
D50 ORDER, D50ORD: 0 ML
D50 ORDER, D50ORD: 11 ML
D50 ORDER, D50ORD: 12 ML
D50 ORDER, D50ORD: 8 ML
D50 ORDER, D50ORD: 8 ML
DIAGNOSIS, 93000: NORMAL
ERYTHROCYTE [DISTWIDTH] IN BLOOD BY AUTOMATED COUNT: 15.5 % (ref 11.5–14.5)
ERYTHROCYTE [DISTWIDTH] IN BLOOD BY AUTOMATED COUNT: 15.5 % (ref 11.5–14.5)
GLOBULIN SER CALC-MCNC: 1.6 G/DL (ref 2–4)
GLOBULIN SER CALC-MCNC: 1.6 G/DL (ref 2–4)
GLUCOSE BLD STRIP.AUTO-MCNC: 105 MG/DL (ref 65–117)
GLUCOSE BLD STRIP.AUTO-MCNC: 108 MG/DL (ref 65–117)
GLUCOSE BLD STRIP.AUTO-MCNC: 109 MG/DL (ref 65–117)
GLUCOSE BLD STRIP.AUTO-MCNC: 109 MG/DL (ref 65–117)
GLUCOSE BLD STRIP.AUTO-MCNC: 114 MG/DL (ref 65–117)
GLUCOSE BLD STRIP.AUTO-MCNC: 116 MG/DL (ref 65–117)
GLUCOSE BLD STRIP.AUTO-MCNC: 116 MG/DL (ref 65–117)
GLUCOSE BLD STRIP.AUTO-MCNC: 117 MG/DL (ref 65–117)
GLUCOSE BLD STRIP.AUTO-MCNC: 118 MG/DL (ref 65–117)
GLUCOSE BLD STRIP.AUTO-MCNC: 120 MG/DL (ref 65–117)
GLUCOSE BLD STRIP.AUTO-MCNC: 128 MG/DL (ref 65–117)
GLUCOSE BLD STRIP.AUTO-MCNC: 137 MG/DL (ref 65–117)
GLUCOSE BLD STRIP.AUTO-MCNC: 145 MG/DL (ref 65–117)
GLUCOSE BLD STRIP.AUTO-MCNC: 149 MG/DL (ref 65–117)
GLUCOSE BLD STRIP.AUTO-MCNC: 170 MG/DL (ref 65–117)
GLUCOSE BLD STRIP.AUTO-MCNC: 179 MG/DL (ref 65–117)
GLUCOSE BLD STRIP.AUTO-MCNC: 366 MG/DL (ref 65–117)
GLUCOSE BLD STRIP.AUTO-MCNC: 409 MG/DL (ref 65–117)
GLUCOSE BLD STRIP.AUTO-MCNC: 64 MG/DL (ref 65–117)
GLUCOSE BLD STRIP.AUTO-MCNC: 69 MG/DL (ref 65–117)
GLUCOSE BLD STRIP.AUTO-MCNC: 70 MG/DL (ref 65–117)
GLUCOSE BLD STRIP.AUTO-MCNC: 71 MG/DL (ref 65–117)
GLUCOSE BLD STRIP.AUTO-MCNC: 73 MG/DL (ref 65–117)
GLUCOSE BLD STRIP.AUTO-MCNC: 79 MG/DL (ref 65–117)
GLUCOSE BLD STRIP.AUTO-MCNC: 79 MG/DL (ref 65–117)
GLUCOSE BLD STRIP.AUTO-MCNC: 86 MG/DL (ref 65–117)
GLUCOSE BLD STRIP.AUTO-MCNC: 89 MG/DL (ref 65–117)
GLUCOSE BLD STRIP.AUTO-MCNC: 91 MG/DL (ref 65–117)
GLUCOSE BLD STRIP.AUTO-MCNC: 95 MG/DL (ref 65–117)
GLUCOSE BLD STRIP.AUTO-MCNC: 98 MG/DL (ref 65–117)
GLUCOSE SERPL-MCNC: 137 MG/DL (ref 65–100)
GLUCOSE SERPL-MCNC: 140 MG/DL (ref 65–100)
GLUCOSE, GLC: 105 MG/DL
GLUCOSE, GLC: 108 MG/DL
GLUCOSE, GLC: 109 MG/DL
GLUCOSE, GLC: 109 MG/DL
GLUCOSE, GLC: 114 MG/DL
GLUCOSE, GLC: 116 MG/DL
GLUCOSE, GLC: 117 MG/DL
GLUCOSE, GLC: 118 MG/DL
GLUCOSE, GLC: 120 MG/DL
GLUCOSE, GLC: 128 MG/DL
GLUCOSE, GLC: 137 MG/DL
GLUCOSE, GLC: 145 MG/DL
GLUCOSE, GLC: 149 MG/DL
GLUCOSE, GLC: 71 MG/DL
GLUCOSE, GLC: 73 MG/DL
GLUCOSE, GLC: 79 MG/DL
GLUCOSE, GLC: 79 MG/DL
GLUCOSE, GLC: 86 MG/DL
GLUCOSE, GLC: 89 MG/DL
GLUCOSE, GLC: 91 MG/DL
GLUCOSE, GLC: 95 MG/DL
GLUCOSE, GLC: 98 MG/DL
HCO3 BLD-SCNC: 21.3 MMOL/L (ref 22–26)
HCO3 BLD-SCNC: 23.9 MMOL/L (ref 22–26)
HCT VFR BLD AUTO: 35 % (ref 35–47)
HCT VFR BLD AUTO: 39 % (ref 35–47)
HGB BLD-MCNC: 11.1 G/DL (ref 11.5–16)
HGB BLD-MCNC: 12.7 G/DL (ref 11.5–16)
HIGH TARGET, HITG: 130 MG/DL
INSULIN ADMINSTERED, INSADM: 0 UNITS/HOUR
INSULIN ADMINSTERED, INSADM: 1.6 UNITS/HOUR
INSULIN ADMINSTERED, INSADM: 1.6 UNITS/HOUR
INSULIN ADMINSTERED, INSADM: 2.2 UNITS/HOUR
INSULIN ADMINSTERED, INSADM: 2.3 UNITS/HOUR
INSULIN ADMINSTERED, INSADM: 3 UNITS/HOUR
INSULIN ADMINSTERED, INSADM: 3.2 UNITS/HOUR
INSULIN ADMINSTERED, INSADM: 4.2 UNITS/HOUR
INSULIN ADMINSTERED, INSADM: 5.3 UNITS/HOUR
INSULIN ADMINSTERED, INSADM: 5.4 UNITS/HOUR
INSULIN ADMINSTERED, INSADM: 6.2 UNITS/HOUR
INSULIN ADMINSTERED, INSADM: 6.4 UNITS/HOUR
INSULIN ADMINSTERED, INSADM: 6.6 UNITS/HOUR
INSULIN ADMINSTERED, INSADM: 8.5 UNITS/HOUR
INSULIN ORDER, INSORD: 0 UNITS/HOUR
INSULIN ORDER, INSORD: 0.8 UNITS/HOUR
INSULIN ORDER, INSORD: 0.8 UNITS/HOUR
INSULIN ORDER, INSORD: 1 UNITS/HOUR
INSULIN ORDER, INSORD: 1.6 UNITS/HOUR
INSULIN ORDER, INSORD: 2.2 UNITS/HOUR
INSULIN ORDER, INSORD: 2.3 UNITS/HOUR
INSULIN ORDER, INSORD: 2.3 UNITS/HOUR
INSULIN ORDER, INSORD: 3 UNITS/HOUR
INSULIN ORDER, INSORD: 3.2 UNITS/HOUR
INSULIN ORDER, INSORD: 4.2 UNITS/HOUR
INSULIN ORDER, INSORD: 5.3 UNITS/HOUR
INSULIN ORDER, INSORD: 5.4 UNITS/HOUR
INSULIN ORDER, INSORD: 6.2 UNITS/HOUR
INSULIN ORDER, INSORD: 6.4 UNITS/HOUR
INSULIN ORDER, INSORD: 6.6 UNITS/HOUR
INSULIN ORDER, INSORD: 8.5 UNITS/HOUR
LACTATE SERPL-SCNC: 1.6 MMOL/L (ref 0.4–2)
LACTATE SERPL-SCNC: 2.5 MMOL/L (ref 0.4–2)
LOW TARGET, LOT: 95 MG/DL
MAGNESIUM SERPL-MCNC: 2.2 MG/DL (ref 1.6–2.4)
MCH RBC QN AUTO: 28 PG (ref 26–34)
MCH RBC QN AUTO: 28.2 PG (ref 26–34)
MCHC RBC AUTO-ENTMCNC: 31.7 G/DL (ref 30–36.5)
MCHC RBC AUTO-ENTMCNC: 32.6 G/DL (ref 30–36.5)
MCV RBC AUTO: 86.1 FL (ref 80–99)
MCV RBC AUTO: 89.1 FL (ref 80–99)
METHGB MFR BLD: 0.2 % (ref 0–1.4)
MINUTES UNTIL NEXT BG, NBG: 120 MIN
MINUTES UNTIL NEXT BG, NBG: 15 MIN
MINUTES UNTIL NEXT BG, NBG: 60 MIN
MULTIPLIER, MUL: 0.01
MULTIPLIER, MUL: 0.03
MULTIPLIER, MUL: 0.03
MULTIPLIER, MUL: 0.04
MULTIPLIER, MUL: 0.06
MULTIPLIER, MUL: 0.07
MULTIPLIER, MUL: 0.09
MULTIPLIER, MUL: 0.11
NRBC # BLD: 0 K/UL (ref 0–0.01)
NRBC # BLD: 0 K/UL (ref 0–0.01)
NRBC BLD-RTO: 0 PER 100 WBC
NRBC BLD-RTO: 0 PER 100 WBC
ORDER INITIALS, ORDINIT: NORMAL
OXYHGB MFR BLD: 77.1 % (ref 94–97)
PCO2 BLD: 35.9 MMHG (ref 35–45)
PCO2 BLD: 42.8 MMHG (ref 35–45)
PH BLD: 7.36 (ref 7.35–7.45)
PH BLD: 7.38 (ref 7.35–7.45)
PLATELET # BLD AUTO: 44 K/UL (ref 150–400)
PLATELET # BLD AUTO: 44 K/UL (ref 150–400)
PMV BLD AUTO: 12.3 FL (ref 8.9–12.9)
PO2 BLD: 115 MMHG (ref 80–100)
PO2 BLD: 129 MMHG (ref 80–100)
POTASSIUM SERPL-SCNC: 4 MMOL/L (ref 3.5–5.1)
POTASSIUM SERPL-SCNC: 4 MMOL/L (ref 3.5–5.1)
PROT SERPL-MCNC: 4.8 G/DL (ref 6.4–8.2)
PROT SERPL-MCNC: 5 G/DL (ref 6.4–8.2)
Q-T INTERVAL, ECG07: 528 MS
QRS DURATION, ECG06: 148 MS
QTC CALCULATION (BEZET), ECG08: 557 MS
RBC # BLD AUTO: 3.93 M/UL (ref 3.8–5.2)
RBC # BLD AUTO: 4.53 M/UL (ref 3.8–5.2)
SAO2 % BLD: 78 % (ref 95–99)
SAO2 % BLD: 98.5 % (ref 92–97)
SAO2 % BLD: 98.8 % (ref 92–97)
SERVICE CMNT-IMP: ABNORMAL
SERVICE CMNT-IMP: NORMAL
SODIUM SERPL-SCNC: 142 MMOL/L (ref 136–145)
SODIUM SERPL-SCNC: 145 MMOL/L (ref 136–145)
SPECIMEN EXP DATE BLD: NORMAL
SPECIMEN SITE: ABNORMAL
SPECIMEN TYPE: ABNORMAL
SPECIMEN TYPE: ABNORMAL
STATUS OF UNIT,%ST: NORMAL
UNIT DIVISION, %UDIV: 0
VENTRICULAR RATE, ECG03: 67 BPM
WBC # BLD AUTO: 11.4 K/UL (ref 3.6–11)
WBC # BLD AUTO: 12.5 K/UL (ref 3.6–11)

## 2023-02-08 PROCEDURE — 74011250636 HC RX REV CODE- 250/636: Performed by: INTERNAL MEDICINE

## 2023-02-08 PROCEDURE — 93005 ELECTROCARDIOGRAM TRACING: CPT

## 2023-02-08 PROCEDURE — 83605 ASSAY OF LACTIC ACID: CPT

## 2023-02-08 PROCEDURE — 74011250636 HC RX REV CODE- 250/636: Performed by: THORACIC SURGERY (CARDIOTHORACIC VASCULAR SURGERY)

## 2023-02-08 PROCEDURE — 74011250636 HC RX REV CODE- 250/636: Performed by: NURSE PRACTITIONER

## 2023-02-08 PROCEDURE — 36415 COLL VENOUS BLD VENIPUNCTURE: CPT

## 2023-02-08 PROCEDURE — 82962 GLUCOSE BLOOD TEST: CPT

## 2023-02-08 PROCEDURE — 86022 PLATELET ANTIBODIES: CPT

## 2023-02-08 PROCEDURE — 30233K1 TRANSFUSION OF NONAUTOLOGOUS FROZEN PLASMA INTO PERIPHERAL VEIN, PERCUTANEOUS APPROACH: ICD-10-PCS | Performed by: THORACIC SURGERY (CARDIOTHORACIC VASCULAR SURGERY)

## 2023-02-08 PROCEDURE — 85027 COMPLETE CBC AUTOMATED: CPT

## 2023-02-08 PROCEDURE — 74011250636 HC RX REV CODE- 250/636: Performed by: STUDENT IN AN ORGANIZED HEALTH CARE EDUCATION/TRAINING PROGRAM

## 2023-02-08 PROCEDURE — 77010033678 HC OXYGEN DAILY

## 2023-02-08 PROCEDURE — C9113 INJ PANTOPRAZOLE SODIUM, VIA: HCPCS | Performed by: NURSE PRACTITIONER

## 2023-02-08 PROCEDURE — 82375 ASSAY CARBOXYHB QUANT: CPT

## 2023-02-08 PROCEDURE — 80053 COMPREHEN METABOLIC PANEL: CPT

## 2023-02-08 PROCEDURE — 65610000003 HC RM ICU SURGICAL

## 2023-02-08 PROCEDURE — 74011000250 HC RX REV CODE- 250: Performed by: NURSE PRACTITIONER

## 2023-02-08 PROCEDURE — P9073 PLATELETS PHERESIS PATH REDU: HCPCS

## 2023-02-08 PROCEDURE — 82542 COL CHROMOTOGRAPHY QUAL/QUAN: CPT

## 2023-02-08 PROCEDURE — 83735 ASSAY OF MAGNESIUM: CPT

## 2023-02-08 PROCEDURE — 74011250637 HC RX REV CODE- 250/637: Performed by: NURSE PRACTITIONER

## 2023-02-08 PROCEDURE — 74011000258 HC RX REV CODE- 258: Performed by: NURSE PRACTITIONER

## 2023-02-08 PROCEDURE — 36430 TRANSFUSION BLD/BLD COMPNT: CPT

## 2023-02-08 PROCEDURE — 30233R1 TRANSFUSION OF NONAUTOLOGOUS PLATELETS INTO PERIPHERAL VEIN, PERCUTANEOUS APPROACH: ICD-10-PCS | Performed by: THORACIC SURGERY (CARDIOTHORACIC VASCULAR SURGERY)

## 2023-02-08 PROCEDURE — P9045 ALBUMIN (HUMAN), 5%, 250 ML: HCPCS | Performed by: THORACIC SURGERY (CARDIOTHORACIC VASCULAR SURGERY)

## 2023-02-08 PROCEDURE — 82803 BLOOD GASES ANY COMBINATION: CPT

## 2023-02-08 PROCEDURE — 71045 X-RAY EXAM CHEST 1 VIEW: CPT

## 2023-02-08 PROCEDURE — 36600 WITHDRAWAL OF ARTERIAL BLOOD: CPT

## 2023-02-08 PROCEDURE — P9045 ALBUMIN (HUMAN), 5%, 250 ML: HCPCS | Performed by: INTERNAL MEDICINE

## 2023-02-08 RX ORDER — HYDROMORPHONE HYDROCHLORIDE 1 MG/ML
0.5 INJECTION, SOLUTION INTRAMUSCULAR; INTRAVENOUS; SUBCUTANEOUS ONCE
Status: COMPLETED | OUTPATIENT
Start: 2023-02-08 | End: 2023-02-08

## 2023-02-08 RX ORDER — FUROSEMIDE 10 MG/ML
20 INJECTION INTRAMUSCULAR; INTRAVENOUS 2 TIMES DAILY
Status: DISCONTINUED | OUTPATIENT
Start: 2023-02-08 | End: 2023-02-08

## 2023-02-08 RX ORDER — ALBUMIN HUMAN 50 G/1000ML
12.5 SOLUTION INTRAVENOUS
Status: COMPLETED | OUTPATIENT
Start: 2023-02-08 | End: 2023-02-08

## 2023-02-08 RX ORDER — SODIUM CHLORIDE 9 MG/ML
250 INJECTION, SOLUTION INTRAVENOUS AS NEEDED
Status: DISCONTINUED | OUTPATIENT
Start: 2023-02-08 | End: 2023-02-09 | Stop reason: ALTCHOICE

## 2023-02-08 RX ORDER — FUROSEMIDE 10 MG/ML
40 INJECTION INTRAMUSCULAR; INTRAVENOUS ONCE
Status: COMPLETED | OUTPATIENT
Start: 2023-02-08 | End: 2023-02-08

## 2023-02-08 RX ORDER — FUROSEMIDE 10 MG/ML
40 INJECTION INTRAMUSCULAR; INTRAVENOUS 2 TIMES DAILY
Status: DISCONTINUED | OUTPATIENT
Start: 2023-02-09 | End: 2023-02-11

## 2023-02-08 RX ORDER — FUROSEMIDE 10 MG/ML
40 INJECTION INTRAMUSCULAR; INTRAVENOUS 2 TIMES DAILY
Status: DISCONTINUED | OUTPATIENT
Start: 2023-02-08 | End: 2023-02-08

## 2023-02-08 RX ORDER — ALBUMIN HUMAN 50 G/1000ML
12.5 SOLUTION INTRAVENOUS
Status: DISCONTINUED | OUTPATIENT
Start: 2023-02-08 | End: 2023-02-11

## 2023-02-08 RX ORDER — HYDROMORPHONE HYDROCHLORIDE 1 MG/ML
1 INJECTION, SOLUTION INTRAMUSCULAR; INTRAVENOUS; SUBCUTANEOUS
Status: DISCONTINUED | OUTPATIENT
Start: 2023-02-08 | End: 2023-02-10

## 2023-02-08 RX ORDER — HYDROMORPHONE HYDROCHLORIDE 1 MG/ML
0.5 INJECTION, SOLUTION INTRAMUSCULAR; INTRAVENOUS; SUBCUTANEOUS
Status: DISCONTINUED | OUTPATIENT
Start: 2023-02-08 | End: 2023-02-15 | Stop reason: HOSPADM

## 2023-02-08 RX ADMIN — ALBUMIN (HUMAN) 12.5 G: 12.5 INJECTION, SOLUTION INTRAVENOUS at 06:25

## 2023-02-08 RX ADMIN — SODIUM CHLORIDE 9 ML/HR: 9 INJECTION, SOLUTION INTRAVENOUS at 03:52

## 2023-02-08 RX ADMIN — SODIUM CHLORIDE 40 MG: 9 INJECTION, SOLUTION INTRAMUSCULAR; INTRAVENOUS; SUBCUTANEOUS at 08:48

## 2023-02-08 RX ADMIN — FUROSEMIDE 40 MG: 10 INJECTION, SOLUTION INTRAMUSCULAR; INTRAVENOUS at 15:39

## 2023-02-08 RX ADMIN — ACETAMINOPHEN 1000 MG: 500 TABLET, FILM COATED ORAL at 12:08

## 2023-02-08 RX ADMIN — SODIUM CHLORIDE 10 ML/HR: 4.5 INJECTION, SOLUTION INTRAVENOUS at 03:52

## 2023-02-08 RX ADMIN — MONTELUKAST 10 MG: 10 TABLET, FILM COATED ORAL at 22:56

## 2023-02-08 RX ADMIN — OXYCODONE HYDROCHLORIDE 5 MG: 5 TABLET ORAL at 15:30

## 2023-02-08 RX ADMIN — PROPOFOL 40 MCG/KG/MIN: 10 INJECTION, EMULSION INTRAVENOUS at 08:20

## 2023-02-08 RX ADMIN — DOBUTAMINE HYDROCHLORIDE 10 MCG/KG/MIN: 200 INJECTION INTRAVENOUS at 08:22

## 2023-02-08 RX ADMIN — DEXTROSE MONOHYDRATE 50 ML: 100 INJECTION, SOLUTION INTRAVENOUS at 15:08

## 2023-02-08 RX ADMIN — HYDROMORPHONE HYDROCHLORIDE 0.5 MG: 1 INJECTION, SOLUTION INTRAMUSCULAR; INTRAVENOUS; SUBCUTANEOUS at 17:47

## 2023-02-08 RX ADMIN — SODIUM CHLORIDE, PRESERVATIVE FREE 10 ML: 5 INJECTION INTRAVENOUS at 22:59

## 2023-02-08 RX ADMIN — WATER 2 G: 1 INJECTION INTRAMUSCULAR; INTRAVENOUS; SUBCUTANEOUS at 06:00

## 2023-02-08 RX ADMIN — SENNOSIDES AND DOCUSATE SODIUM 1 TABLET: 50; 8.6 TABLET ORAL at 08:49

## 2023-02-08 RX ADMIN — ACETAMINOPHEN 1000 MG: 500 TABLET, FILM COATED ORAL at 00:34

## 2023-02-08 RX ADMIN — SODIUM CHLORIDE, PRESERVATIVE FREE 10 ML: 5 INJECTION INTRAVENOUS at 07:18

## 2023-02-08 RX ADMIN — DEXTROSE MONOHYDRATE 50 ML: 100 INJECTION, SOLUTION INTRAVENOUS at 19:16

## 2023-02-08 RX ADMIN — MAGNESIUM OXIDE 400 MG (241.3 MG MAGNESIUM) TABLET 400 MG: TABLET at 08:49

## 2023-02-08 RX ADMIN — AMIODARONE HYDROCHLORIDE 1 MG/MIN: 50 INJECTION, SOLUTION INTRAVENOUS at 00:34

## 2023-02-08 RX ADMIN — ACETAMINOPHEN 1000 MG: 500 TABLET, FILM COATED ORAL at 20:39

## 2023-02-08 RX ADMIN — ALBUMIN (HUMAN) 12.5 G: 12.5 INJECTION, SOLUTION INTRAVENOUS at 02:30

## 2023-02-08 RX ADMIN — MUPIROCIN: 20 OINTMENT TOPICAL at 18:13

## 2023-02-08 RX ADMIN — AMIODARONE HYDROCHLORIDE 0.5 MG/MIN: 50 INJECTION, SOLUTION INTRAVENOUS at 07:38

## 2023-02-08 RX ADMIN — HYDROMORPHONE HYDROCHLORIDE 0.5 MG: 1 INJECTION, SOLUTION INTRAMUSCULAR; INTRAVENOUS; SUBCUTANEOUS at 11:44

## 2023-02-08 RX ADMIN — FUROSEMIDE 20 MG: 10 INJECTION, SOLUTION INTRAMUSCULAR; INTRAVENOUS at 10:57

## 2023-02-08 RX ADMIN — HYDROMORPHONE HYDROCHLORIDE 0.5 MG: 1 INJECTION, SOLUTION INTRAMUSCULAR; INTRAVENOUS; SUBCUTANEOUS at 16:53

## 2023-02-08 RX ADMIN — AMIODARONE HYDROCHLORIDE 0.5 MG/MIN: 50 INJECTION, SOLUTION INTRAVENOUS at 17:34

## 2023-02-08 RX ADMIN — ACETAMINOPHEN 1000 MG: 500 TABLET, FILM COATED ORAL at 06:00

## 2023-02-08 RX ADMIN — ALBUMIN (HUMAN) 12.5 G: 12.5 INJECTION, SOLUTION INTRAVENOUS at 06:51

## 2023-02-08 RX ADMIN — DEXMEDETOMIDINE HYDROCHLORIDE 0.6 MCG/KG/HR: 4 INJECTION, SOLUTION INTRAVENOUS at 03:57

## 2023-02-08 RX ADMIN — HYDROMORPHONE HYDROCHLORIDE 0.5 MG: 1 INJECTION, SOLUTION INTRAMUSCULAR; INTRAVENOUS; SUBCUTANEOUS at 05:05

## 2023-02-08 RX ADMIN — CHLORHEXIDINE GLUCONATE 10 ML: 1.2 RINSE ORAL at 18:12

## 2023-02-08 RX ADMIN — DEXTROSE MONOHYDRATE 60 ML: 100 INJECTION, SOLUTION INTRAVENOUS at 11:20

## 2023-02-08 RX ADMIN — ALBUMIN (HUMAN) 12.5 G: 12.5 INJECTION, SOLUTION INTRAVENOUS at 04:16

## 2023-02-08 RX ADMIN — PROPOFOL 30 MCG/KG/MIN: 10 INJECTION, EMULSION INTRAVENOUS at 03:52

## 2023-02-08 RX ADMIN — WATER 2 G: 1 INJECTION INTRAMUSCULAR; INTRAVENOUS; SUBCUTANEOUS at 18:11

## 2023-02-08 RX ADMIN — HYDROMORPHONE HYDROCHLORIDE 0.5 MG: 1 INJECTION, SOLUTION INTRAMUSCULAR; INTRAVENOUS; SUBCUTANEOUS at 22:58

## 2023-02-08 RX ADMIN — MUPIROCIN: 20 OINTMENT TOPICAL at 08:50

## 2023-02-08 RX ADMIN — DEXTROSE MONOHYDRATE 50 ML: 100 INJECTION, SOLUTION INTRAVENOUS at 16:32

## 2023-02-08 RX ADMIN — CHLORHEXIDINE GLUCONATE 10 ML: 1.2 RINSE ORAL at 08:48

## 2023-02-08 RX ADMIN — HYDROMORPHONE HYDROCHLORIDE 0.5 MG: 1 INJECTION, SOLUTION INTRAMUSCULAR; INTRAVENOUS; SUBCUTANEOUS at 10:38

## 2023-02-08 RX ADMIN — ATORVASTATIN CALCIUM 40 MG: 40 TABLET, FILM COATED ORAL at 22:56

## 2023-02-08 RX ADMIN — DOBUTAMINE HYDROCHLORIDE 5 MCG/KG/MIN: 200 INJECTION INTRAVENOUS at 23:00

## 2023-02-08 RX ADMIN — WATER 2 G: 1 INJECTION INTRAMUSCULAR; INTRAVENOUS; SUBCUTANEOUS at 00:34

## 2023-02-08 RX ADMIN — OXYCODONE HYDROCHLORIDE 10 MG: 5 TABLET ORAL at 21:24

## 2023-02-08 RX ADMIN — SODIUM CHLORIDE 10 ML/HR: 4.5 INJECTION, SOLUTION INTRAVENOUS at 19:57

## 2023-02-08 RX ADMIN — DEXMEDETOMIDINE HYDROCHLORIDE 0.6 MCG/KG/HR: 4 INJECTION, SOLUTION INTRAVENOUS at 11:54

## 2023-02-08 RX ADMIN — WATER 2 G: 1 INJECTION INTRAMUSCULAR; INTRAVENOUS; SUBCUTANEOUS at 12:08

## 2023-02-08 RX ADMIN — POLYETHYLENE GLYCOL 3350 17 G: 17 POWDER, FOR SOLUTION ORAL at 08:49

## 2023-02-08 RX ADMIN — SODIUM CHLORIDE 9 ML/HR: 9 INJECTION, SOLUTION INTRAVENOUS at 20:50

## 2023-02-08 NOTE — PROGRESS NOTES
Occupational Therapy:     Chart reviewed in prep for OT evaluation and tx. Discussed with RN at bedside who confirms pt remains medically inappropriate for participation in therapy today. Will hold and follow as able and appropriate.      Thank you,   Shira Guillory, OTD, OTR/L

## 2023-02-08 NOTE — DIABETES MGMT
[facility]  DIABETES MANAGEMENT CONSULT    Consulted by Provider for advanced nursing evaluation and care for inpatient blood glucose management. Evaluation and Action Plan   Niels Rodrigues is a 71 yo female s/p aortic repair and redo AVR repair for severe AS. Remains intubated and medically sedated POD 1 for respiratory failure post op- had significant EBL intraop-4500cc with PRBC transfusions. Since patient is pre-diabetic anticipate patient will not require additional insulin other than correctional after insulin infusion discontinued. Management Rationale Action Plan     1. Insulin infusion per post-operative protocol    2. Give Lantus 2 hrs prior transitioning off insulin gtt    3. Blood glucose monitoring ACHS once off insulin infusion. If glucose within goal, ok to trend on am labs and d/c correctional insulin. Diabetes Discharge Plan   Medication     Referral  []        Outpatient diabetes education   Additional orders            Initial Presentation   Niels Rodrigues is a 72 y.o. female admitted 2/7/23 s/p AVR (redo) with aortic repair for severe AS. Per report procedure technically difficult with significant adhesion and friability of tissue with associated significant blood loss. Intra op SHIVANI w/ EF 35%.       HX:   Past Medical History:   Diagnosis Date    Adverse effect of anesthesia     MORPHINE - DIFFICULTY WAKING    Aneurysm ([address] 02/2023    AORTIC ANEURYSM    Aortic stenosis 05/04/2010    Arthritis     Asthma 05/04/2010    Congestive heart failure ([address]     heart disease    Elevated cholesterol 05/04/2010    elevated particle number    Ex-smoker 05/04/2010    Family history of diabetes mellitus (DM) 05/04/2010    Family history of early CAD 05/04/2010    GERD (gastroesophageal reflux disease)     Heart murmur     Hypertension     Ill-defined condition     ALLERGIC TO MOLD AND MILDEW    Osteopenia 05/04/2010    Stroke ([address] 01/31/2023    TIA Uterine fibroid 05/04/2010    Vitamin D deficiency 05/10/2010        INITIAL DX:   Aortic stenosis, severe [I35.0]     Current Treatment     TX: post op care/ vasopressor support/ insulin infusion    Hospital Course   Clinical progress has been complicated by post op Acute hypoxemic, hypercapnic respiratory failure/ Acute on chronic HFrEF. Also had intraoperative significant blood loss-4500cc. 2/7/23: admitted/ s./p redo AVR repair    Diabetes History   Pre-diabetes, per A1C 5.8%. Diabetes Medication History  Key Antihyperglycemic Medications       Patient is on no antihyperglycemic meds. Reducing risks  [] Influenza: There is no immunization history for the selected administration types on file for this patient. [] Pneumonia:   Immunization History   Administered Date(s) Administered    Pneumococcal Polysaccharide (PPSV-23) 07/20/2014     [] Hepatitis: There is no immunization history for the selected administration types on file for this patient. Subjective   Patient remains intubated at time of visit. Objective   Physical exam  General Obese female-intubated  Neuro  Medically sedated  Vital Signs Visit Vitals  BP (!) 116/55   Pulse 75   Temp 99.2 °F (37.3 °C)   Resp 16   Ht 5' (1.524 m)   Wt 86.7 kg (191 lb 2.2 oz)   SpO2 98%   BMI 37.33 kg/m²     Skin  Warm and dry. Heart   Regular rate and rhythm.  No murmurs, rubs or gallops  Lungs  Clear to auscultation without rales or rhonchi  Extremities No foot wounds     Laboratory  Recent Labs     02/08/23  0405 02/07/23  1933 02/07/23  1553   * 204* 130*   AGAP 7 9 7   WBC 11.4*  --  16.4*   CREA 0.64 0.75 0.48*   AST 51* 53* 36   ALT 19 24 18       Factors impacting BG management  Factor Dose Comments   Nutrition:  Standard meals-NPO         Drugs:  Vasopressor cat  Blood transfusion(s)   On/off  Intra operative PRBC transfusion     Affects insulin delivery  A1cs inaccurate-for following 3mo     Pain Post op prn    Infection WBC 11.4 Other:   Kidney function  Liver function     eGFR >60      Blood glucose pattern    Significant diabetes-related events over the past 24-72 hours  Pre diabetes, A1C 5.8%  Insulin infusion per CTS protocol   Insulin requirement  2/7: 38 units      Assessment and Nursing Intervention   Nursing Diagnosis Risk for unstable blood glucose pattern   Nursing Intervention Domain 5250 Decision-making Support   Nursing Interventions Examined current inpatient diabetes/blood glucose control   Explored factors facilitating and impeding inpatient management  Explored corrective strategies with patient and responsible inpatient provider   Informed patient of rational for insulin strategy while hospitalized     Nursing Diagnosis 00332 Ineffective Health Management   Nursing Intervention Domain 5250 Decision-making Support   Nursing Interventions Identified diabetes self-management practices impeding diabetes control  Discussed diabetes survival skills related to  Healthy Plate eating plan; given handouts  Role of physical activity in improving insulin sensitivity and action  Procedure for blood glucose monitoring & options for low-cost products  Medications plan at discharge     Billing Code(s)   No charge    Before making these care recommendations, I personally reviewed the hospitalization record, including notes, laboratory & diagnostic data and current medications, and examined the patient at the bedside (circumstances permitting) before determining care. More than fifty (50) percent of the time was spent in patient counseling and/or care coordination.   Total minutes: [address] [place] KAY Garcia  Diabetes Clinical Nurse Specialist  [facility]  Access via [employer] [facility]

## 2023-02-08 NOTE — PROGRESS NOTES
0930: acute kidney biomarker sent, labs for HIT sent and Lactate sent    1015: epi to 3 mcg    1050: Diprivan to 20 mcg    1100: [employer] in to interogate pacemaker    1130: diprivan off    1145: patient awake and calm, obeys all commands. Moves everything except left hand, patient nods that she can feel touch. NP notified. 1200: Diprivan back on, patient in pain and anxious    1200: Epi to 2 mcg. FiO2 to 45% on vent    1230: Epi to 1 mcg. Diprivan on at 20 mcg to try and get SBP greater than 90    1320: Epi off    1415: dobutamine to 9 mcg and PEEP to 5 cm    1510: dobutamine to 8 mcg, Diprivan off    1540: patient wide awake, vent to SIMV 9. Still not moving left arm. PA at bedside to evaluate    1610: to CPAP    1646: Extubated to 4L. A line out/not working    [id number]: precedex back  on for pain control. 1730: dobutamine to 7 mcg.    1735: precedex to 0.4 mcg for pain control    2010: Bedside shift change report given to Yoana ENCARNACION (oncoming nurse) by Bertha Fernandez RN (offgoing nurse). Report included the following information SBAR, Procedure Summary, Intake/Output, MAR, and Recent Results.

## 2023-02-08 NOTE — PROGRESS NOTES
Cardiac Surgery Coordinator- Placed update call to Mr Tressa Bautista, no answer left voicemail and contact information. Received return call from Mr Tressa Bautista, provided clinical update and encouraged him to verbalize. Will continue to follow.  Shaheen Noyola RN

## 2023-02-08 NOTE — PROGRESS NOTES
: Bedside shift change report given to Yoana LEON RN/Sari HOUSE RN (oncoming nurse) by Mary Grimes RN (offgoing nurse). Report included the following information SBAR, Intake/Output, MAR, Recent Results, Cardiac Rhythm AV paced, and Alarm Parameters . : pt stacking breaths on vent, Propofol gtt started to promote rest    : AB.29 / 38.5 / 121 / 18.4 / 98.2 / -7.6 / ical 1.23 . Juli Parra MD notified. Orders received for 2 amps bicarb and to repeat ABG. Discussed concerns regarding CI, orders received to increase Dobuta to 7.5    : Nicardipine gtt started for SBP >110    : MAP 65-67, PAD 19-21, CI 1.7. albumin x1    : RRT at bedside, OETT hernandez placed on pt, pt coughed with stimulation,  remains appropriate. Propofol gtt increased to promote destimulation and rest    10: AB.38 / 35.9 / 115 / 21.3 / 98.5 / -3.2 . Juli Parra MD notified, no new orders at this time    0045: Wilfrido's formula used to assess CO/CI: CO 3, CI 1.7, correlates with hemosphere monitor    0121: CI back down to 1.6-1.7, Juli Parra MD notified, orders received to increase dobutamine to 10    0230: MAP 62-67, CVP 10-12, PAD 14-16, CI 1.8. albumin x1    0416: MAP 64-66, CVP 12, PAD 15-16, CI 1.8. albumin x1    0523: PLTs 44, orders received for 1unit PLTs per Juli Parra MD    3052: MAPs continuing to decreased to 60s despite increase in epi titration. [place] notified and orders received for albumin 5% x3    0625: MAP 58-62, CI 1.9, albumin x1    3589: ABG obtained d/t hypotension: 7.36 / 42.8 / 129 / 23.9 / 98.8 / 1.7 / ical 1.16    0651: MAP 64-67, CI 2, albumin x1    0800: Bedside shift change report given to Jerl Narrow, RN (oncoming nurse) by Marilu Mcintyre RN (offgoing nurse). Report included the following information SBAR, OR Summary, Intake/Output, MAR, Recent Results, Cardiac Rhythm AV paced, and Alarm Parameters .      Shift summary: CI improved with 10 dobuta, 5 albumins, unable to maintain MAP >70s d/t decreased CI with increase in BP.  AM EKG obtained, bath completed    I have reviewed and agree with Sharad Mcclain RN charting, assessment, and med admin

## 2023-02-08 NOTE — PROGRESS NOTES
[facility] ICU Progress Note    Admit Date: 2/7/2023  POD:  1 Day Post-Op    02/08/23    Procedure:  Procedure(s):  RIGHT FEMORAL CUTDOWN; RE-DO STERNOTOMY;  AORTIC VALVE REPLACEMENT (AVR) WITH 21MM INSPIRIS BIOPROSTHETIC VALVE; ASCENDING AORTIC ANEURYSM REPAIR WITH 28MM HEMASHIELD TUBE GRAFT; ECC; SHIVANI & EPIAORTIC US BY DR. ERNANDEZ [facility]       SHIVANI 2/7/23 DURING SURGERY  Indications: assessment of ascending aorta and assessment of surgical repair  Modalities: 2D, CF, CWD, contrast, PWD  Probe Type: multiplane  Insertion: atraumatic  Patient Status: intubated    Complications: None  Comments: Pre SHIVANI  SHIVANI views are distorted as the hear is rotated. LV 35-40%, LVH , septal and inf walls down. RV normal.  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 Post SHIVANI  LV fn preserved, ef 50%. RV normal  Bioprosthetic AV in situ, no AI, NO PVL, asc aorta repaired by tube graft. Mean gradient 6 mm across the AV. MR 2+,   LV fn came down to pre op level of ef 35%, same wall motion problems as before. Subjective/24hr events:   Pt seen with Dr. Candy Morrow, intubated, sedated, CI 0.9 on presentation from operating room to critical care unit, dobutamine escalated overnight with slow recovery of cardiac index, currently on epi 4 mcg/min and dobutamine 10 mcg/kg/min with CI 2.2 range. Some coagulopathy on presentation to ICU, treated with cryo, protamine. CT drainage this am serosanguinous, platelet count decreased to 44, additional unit of platelets given this am.       IV infusions:  Amiodarone 0.5 mg/min  Precedex 0.6 mcg/kg/hr  Dobutamine 10 mcg/kg/min  Epinephrine 4 mcg/min  Insulin 5.4 units/hr  Propofol 40 mcg/kg/min    Vent settings:  AC/VC  Rate 18    Rate 18  PEEP 8  FiO2 60%    ABG:  pH 7.36  pCO2 42  pO2 129  Sat 98.8  Base deficit -1.7     Objective:   Vitals:  Blood pressure (!) 109/48, pulse 65, temperature 98 °F (36.7 °C), resp.  rate 17, height 5' (1.524 m), weight 191 lb 2.2 oz (86.7 kg), SpO2 100 %. Temp (24hrs), Av.8 °F (37.1 °C), Min:94.5 °F (34.7 °C), Max:100.7 °F (38.2 °C)    Hemodynamics:   CO: CO (l/min): 3.9 l/min   CI: CI (l/min/m2): 2.2 l/min/m2   CVP: CVP (mmHg): 32 mmHg (23)   SVR: SVR (dyne*sec)/cm5: 1137 (dyne*sec)/cm5 (23 3083)   PAP Systolic: PAP Systolic: 35 (75/37/65 2545)   PAP Diastolic: PAP Diastolic: 19 (64/97/39 8837)   PVR:     SV02: SVO2 (%): 77 % (23)   SCV02:      EKG/Rhythm:  AV paced with PPM    Extubation Date / Time: intubated and sedated    CT Output: 670/500, total 1170, no air leak, on suction, serosanguinous drainage    Ventilator:  Ventilator Volumes  Vt Set (ml): (S) 360 ml (adjusted to 6mls/kg per protocol) (23)  Vt Exhaled (Machine Breath) (ml): 385 ml (23)  Ve Observed (l/min): 8.68 l/min (23)    Oxygen Therapy:  Oxygen Therapy  O2 Sat (%): 100 % (23)  Pulse via Oximetry: 70 beats per minute (23)  O2 Device: Endotracheal tube;Ventilator (23)  Skin Assessment: Clean, dry, & intact (23)  Skin Protection for O2 Device: Yes (23)  Orientation: Bilateral (23)  Location: Cheek (23)  Interventions: Hydrocolloid Dressing (23)  O2 Temperature:  (HME) (23)  FIO2 (%): (S) 60 % (23)    CXR:  CXR Results  (Last 48 hours)                 23 0509  XR CHEST PORT Final result    Impression:  No significant change. Narrative:  INDICATION: post op heart       EXAMINATION:  AP CHEST, PORTABLE       COMPARISON: 2023       FINDINGS: Single AP portable view of the chest demonstrates no change in   position of the lines and tubes. The cardiomediastinal silhouette is unchanged. Persistent left basilar atelectasis. No pulmonary edema or pneumothorax.            23 1636  XR CHEST PORT Final result    Impression:  Multiple tubes and lines in satisfactory position. No pneumothorax. Mild left   basilar atelectasis. Narrative:  INDICATION:    post op heart        EXAMINATION:  AP CHEST, PORTABLE       COMPARISON: January 31       FINDINGS: Single AP portable view of the chest at 1628 hours demonstrates   intubation and placement of an NG tube, both in satisfactory position. There is   interval median sternotomy. Left-sided pacer device is not significantly   changed. There is a right IJ approach [place]-Lilian catheter, tip in the main   pulmonary outflow tract. There is a right-sided chest tube. There is no evidence   of pneumothorax. There is mild left basilar atelectasis. The cardiomediastinal   silhouette is stable. There is no new airspace disease. Admission Weight: Last Weight   Weight: 170 lb 3.1 oz (77.2 kg) Weight: 191 lb 2.2 oz (86.7 kg)     Intake / Output / Drain:  Current Shift: No intake/output data recorded.   Last 24 hrs.:   Intake/Output Summary (Last 24 hours) at 2/8/2023 0740  Last data filed at 2/8/2023 0700  Gross per 24 hour   Intake 7364.78 ml   Output 2901 ml   Net 4463.78 ml       EXAM:  General: intubated, sedated  Lung: CTA bilaterally  Incision: dry and intact, no drainage, sternum stable  Heart: s1s2, reg rate and rhy, paced with ppm  Abdomen: hypoactive, soft, no bruits  Extremities: warm, well perfused, +2 DP pulse left, +1 DP pulse right, toes with slight mottling, cool to palpation  Neurologic: sedated, pupils equal bilaterally, sluggish response    Labs:   Recent Labs     02/08/23  0636 02/08/23  0407 02/08/23  0405 02/07/23  1558 02/07/23  1553   WBC  --   --  11.4*  --  16.4*   HGB  --   --  12.7   < > 14.0   HCT  --   --  39.0   < > 43.3   PLT  --   --  44*  --  80*   NA  --   --  145   < > 145   K  --   --  4.0   < > 3.8   BUN  --   --  16   < > 10   CREA  --   --  0.64   < > 0.48*   GLU  --   --  137*   < > 130*   GLUCPOC 109   < >  --    < >  --    INR  --   --   --   --  1.4*    < > = values in this interval not displayed. Assessment:     Active Problems: Aortic stenosis, severe (2/7/2023)      S/P aortic valve repair (2/7/2023)      Overview: RIGHT FEMORAL CUTDOWN; RIGHT FEMORAL ARTERIAL CANNULATION,  RE-DO       STERNOTOMY;  HYPOTHERMIC ARREST, ANTEGRADE CEREBRAL PERFUSION VIA       INNOMINATE ARTERY, REDO AORTIC VALVE REPLACEMENT (AVR) WITH 21MM INSPIRIS       BIOPROSTHETIC VALVE; ASCENDING AORTIC ANEURYSM REPAIR WITH 28MM HEMASHIELD       TUBE GRAFT; REPAIR AORTIC TEAR, PULMONARY ARTERY REPAIR, RIGHT ATRIAL       REPAIR, ECC; SHIVANI & EPIAORTIC US BY DR. ERNANDEZ [facility]         Plan/Recommendations/Medical Decision Making:     Ascending aortic aneurysm, end of life bioprosthetic aortic valve s/p redo sternotomy, redo aortic valve replacement, repair ascending aortic aneurysm with tube graft, left femoral cutdown and arterial cannulation on 2/7/23. Cardiogenic shock s/p redo sternotomy, redo AVR currently on dobutamine 10, EPI 4, maintaining CI around 2.2, will wean epi 1st.  Acute respiratory failure with hypoxemia s/p redo sternotomy, redo AVR, required increased oxygen needs overnight, currently on 60% fiO2, peep 8, critical care to assist with vent weaning, ok to wean if patient tolerates hemodynamically. Will add BID diuretic today. Mixed cardiac and non-cardiac fluid overload secondary to hemodynamic instability and cardiogenic shock, urine output decreased, will start diuretic today. Post operative coagulopathy secondary to prolonged cardiopulmonary bypass, currently stable, received multiple blood products to stabilize. Acute blood loss anemia, stable, monitor daily  Thrombocytopenia, plt 44,000,transfused this am, HIT panel sent, stop pepcid, replace with protonix  CHFrEF: EF 45% on 2/1/23, Non-preserved EF heart failure with documented EF 35-45% via SHIVANI in surgery, currently in cardiogenic shock, no BB, ACEI, ARB, Diuretic at this time, will re-evaluate once more hemodynamically stable.   Hx hypertension, PTA losartan 50mg daily - last dose 2/4  Hx hyperlipidemia, PTA atorvastatin 40mg daily  Hx complete heart block r/t post op AVR: [employer]ish pacemaker implanted in 2014 (with lead revision) patient states has 8 months of battery left on pacemaker. Will have PPM interrogated today. Concern for TIA: Patient seen in ED 1 week ago, all testing discussed. Per Neurology is safe to proceed with surgery, they favor it possibly being amaurosis fugax. Per Discharge Summary from hospitalist, consider ASA and plavix at discharge. Asthma: No inhalers noted, hx smoker. PFTs above, baseline ABG pending. GERD: PTA omeprazole 40mg daily.  Currently on protonix 40 mg IV  Post operative debility secondary to open heart surgery, will start therapy once more hemodynamically stable    Signed By: KASSIE Chavez

## 2023-02-08 NOTE — PROGRESS NOTES
CRITICAL CARE NOTE      Name: Yury Flaherty   : 1950   MRN: [id number]   Date: 2023      Reason for ICU Admission: s/p redo AVR and aortic repair     ICU PROBLEM LIST   Severe AS s/p redo AVR and aortic repair   Acute hypoxemic, hypercapnic respiratory failure  Acute on chronic HFrEF  Post op CHB S/p PPM  COPD    HISTORY OF PRESENT ILLNESS:   Pt is a 72 y.o F w/ PMH as noted below who presents to CVICU post planned redo AVR and aortic repair. Per report procedure technically difficult with significant adhesion and friability of tissue with associated significant blood loss. Given DDAVP, Kcentra, protamine and blood product as noted below. Intra op SHIVANI w/ EF 35%. Arrived to CVICU sedated intubated, on precedex, amio and insulin. Paced rhythm from underlying PPM. Started on epi ggt due to decreased CI. Post op CXR w/ LLL atelectasis vs congestion, otherwise expected post op changes. 24 HOUR EVENTS:   Required addition of dobutamine and volume resuscitation overnight, briefly on cardene o/n. On propofol and precedex for IV anxiolysis, on MV. Weaning inotropic support, goal to extubate POD 1.  Received additional Plt this AM    NEUROLOGICAL:    Propofol, precedex  KAVON this AM w/ appropriate mentation, LUE weakness  PRN pain regimen    PULMONOLOGY:   Lung protective ventilation  Wean MV support, PSV when appropriate  Goal extubation today  IS, PFV, OOBTC once extubated    CARDIOVASCULAR:   Dobutamine, epi gtt, wean as able  CI >2, MAP >65,   Lasix ordered per CTS  IF high SVR and SBP, may require Cardene PRN for afterload reduction, SBP <130  Pacer wire and chest tube management per CTS  Monitor drain output  Telemetry monitoring    GASTROINTESTINAL:   Pepcid  NPO     RENAL/ELECTROLYTE/FLUIDS:   Strict I/Os  RFP  Replace lytes PRN    ENDOCRINE:   Insulin gtt per protocol  Glucose 120-180    HEMATOLOGY/ONCOLOGY:   Monitor coags, hgb goal >7  SCDs    ID/MICRO:   Periop ancef    ICU DAILY CHECKLIST Code Status:full  DVT Prophylaxis:SCDs  T/L/D: ETT, PIV, MAC/PAC, V wire, Tyler x4, sonia kaufman  SUP: pepcid  Diet: NPO  Activity Level:ad gianni  ABCDEF Bundle/Checklist Completed:Yes  Disposition: Stay in ICU  Multidisciplinary Rounds Completed:  Yes  Patient/Family Updated: Brina Campbell 50:   As above    Review of Systems:     Review of Systems   Unable to perform ROS: Intubated      OBJECTIVE:     Labs and Data: Reviewed 02/08/23  Medications: Reviewed 02/08/23  Imaging: Reviewed 02/08/23    Physical Exam  Vitals and nursing note reviewed. Constitutional:       Appearance: Normal appearance. She is obese. Comments: sedated   HENT:      Head: Normocephalic and atraumatic. Nose: Nose normal. No congestion. Mouth/Throat:      Mouth: Mucous membranes are moist.      Pharynx: Oropharynx is clear. No oropharyngeal exudate. Comments: ETT  Eyes:      General: No scleral icterus. Extraocular Movements: Extraocular movements intact. Conjunctiva/sclera: Conjunctivae normal.      Pupils: Pupils are equal, round, and reactive to light. Neck:      Comments: RIJ PAC  Cardiovascular:      Rate and Rhythm: Normal rate. Pulses: Normal pulses. Heart sounds: Normal heart sounds. No murmur heard. No gallop. Comments: PPM pocket benign, V wire, mediastinal drain x2, post sternotomy, site C/D/I  Pulmonary:      Effort: Pulmonary effort is normal. No respiratory distress. Breath sounds: Normal breath sounds. No wheezing or rales. Comments: On MV, bilateral pleural chest tubes  Abdominal:      Tenderness: There is no abdominal tenderness. There is no guarding. Comments: obese   Musculoskeletal:         General: Normal range of motion. Cervical back: Normal range of motion. No rigidity. Right lower leg: No edema. Left lower leg: No edema. Skin:     General: Skin is warm and dry.       Capillary Refill: Capillary refill takes less than 2 seconds. Coloration: Skin is not jaundiced. Neurological:      Comments: sedated   Psychiatric:      Comments: sedated        Visit Vitals  BP (!) 108/54   Pulse 75   Temp 99.2 °F (37.3 °C)   Resp 17   Ht 5' (1.524 m)   Wt 86.7 kg (191 lb 2.2 oz)   SpO2 99%   BMI 37.33 kg/m²      O2 Device: Endotracheal tube, Ventilator Temp (24hrs), Av.8 °F (37.1 °C), Min:94.5 °F (34.7 °C), Max:100.7 °F (38.2 °C)    CVP (mmHg): 14 mmHg (23 1200)      Intake/Output:     Intake/Output Summary (Last 24 hours) at 2023 1307  Last data filed at 2023 1200  Gross per 24 hour   Intake 8020.08 ml   Output 3024 ml   Net 4996.08 ml         Imaging    23    OR ECHO SHIVANI INTRAOP  2023    Narrative  See Anesthesia Procedure note for procedure details. Pertinent imaging reviewed and interpreted as noted above      CRITICAL CARE DOCUMENTATION  I had a face to face encounter with the patient, reviewed and interpreted patient data including clinical events, labs, images, vital signs, I/O's, and examined patient. I have discussed the case and the plan and management of the patient's care with the consulting services, the bedside nurses and the respiratory therapist.      NOTE OF PERSONAL INVOLVEMENT IN CARE   This patient has a high probability of imminent, clinically significant deterioration, which requires the highest level of preparedness to intervene urgently. I participated in the decision-making and personally managed or directed the management of the following life and organ supporting interventions that required my frequent assessment to treat or prevent imminent deterioration. I personally spent 60 minutes of critical care time. This is time spent at this critically ill patient's bedside actively involved in patient care as well as the coordination of care. This does not include any procedural time which has been billed separately. Nathan Wen MD  Staff [address]

## 2023-02-09 ENCOUNTER — APPOINTMENT (OUTPATIENT)
Dept: GENERAL RADIOLOGY | Age: 73
DRG: 219 | End: 2023-02-09
Attending: NURSE PRACTITIONER
Payer: COMMERCIAL

## 2023-02-09 LAB
ACUTE KIDNEY INJURY RISK NEPHROCHECK: 1.16 (ref 0–0.3)
ADMINISTERED INITIALS, ADMINIT: NORMAL
ALBUMIN SERPL-MCNC: 3.3 G/DL (ref 3.5–5)
ALBUMIN SERPL-MCNC: 3.3 G/DL (ref 3.5–5)
ALBUMIN/GLOB SERPL: 1.7 (ref 1.1–2.2)
ALBUMIN/GLOB SERPL: 1.8 (ref 1.1–2.2)
ALP SERPL-CCNC: 31 U/L (ref 45–117)
ALP SERPL-CCNC: 34 U/L (ref 45–117)
ALT SERPL-CCNC: 15 U/L (ref 12–78)
ALT SERPL-CCNC: 16 U/L (ref 12–78)
ANION GAP SERPL CALC-SCNC: 7 MMOL/L (ref 5–15)
AST SERPL-CCNC: 38 U/L (ref 15–37)
AST SERPL-CCNC: 40 U/L (ref 15–37)
BASE EXCESS BLD CALC-SCNC: 0.7 MMOL/L
BASE EXCESS BLD CALC-SCNC: 1.4 MMOL/L
BDY SITE: ABNORMAL
BDY SITE: ABNORMAL
BILIRUB DIRECT SERPL-MCNC: 0.2 MG/DL (ref 0–0.2)
BILIRUB SERPL-MCNC: 0.7 MG/DL (ref 0.2–1)
BILIRUB SERPL-MCNC: 0.7 MG/DL (ref 0.2–1)
BLD PROD TYP BPU: NORMAL
BPU ID: NORMAL
BUN SERPL-MCNC: 20 MG/DL (ref 6–20)
BUN/CREAT SERPL: 30 (ref 12–20)
CALCIUM SERPL-MCNC: 8.1 MG/DL (ref 8.5–10.1)
CHLORIDE SERPL-SCNC: 108 MMOL/L (ref 97–108)
CO2 SERPL-SCNC: 27 MMOL/L (ref 21–32)
COHGB MFR BLD: 0.6 % (ref 1–2)
CREAT SERPL-MCNC: 0.66 MG/DL (ref 0.55–1.02)
D50 ADMINISTERED, D50ADM: 0 ML
D50 ORDER, D50ORD: 0 ML
ERYTHROCYTE [DISTWIDTH] IN BLOOD BY AUTOMATED COUNT: 15.5 % (ref 11.5–14.5)
GAS FLOW.O2 O2 DELIVERY SYS: ABNORMAL
GLOBULIN SER CALC-MCNC: 1.8 G/DL (ref 2–4)
GLOBULIN SER CALC-MCNC: 1.9 G/DL (ref 2–4)
GLUCOSE BLD STRIP.AUTO-MCNC: 100 MG/DL (ref 65–117)
GLUCOSE BLD STRIP.AUTO-MCNC: 107 MG/DL (ref 65–117)
GLUCOSE BLD STRIP.AUTO-MCNC: 108 MG/DL (ref 65–117)
GLUCOSE BLD STRIP.AUTO-MCNC: 112 MG/DL (ref 65–117)
GLUCOSE BLD STRIP.AUTO-MCNC: 115 MG/DL (ref 65–117)
GLUCOSE BLD STRIP.AUTO-MCNC: 116 MG/DL (ref 65–117)
GLUCOSE BLD STRIP.AUTO-MCNC: 117 MG/DL (ref 65–117)
GLUCOSE BLD STRIP.AUTO-MCNC: 122 MG/DL (ref 65–117)
GLUCOSE BLD STRIP.AUTO-MCNC: 137 MG/DL (ref 65–117)
GLUCOSE BLD STRIP.AUTO-MCNC: 164 MG/DL (ref 65–117)
GLUCOSE BLD STRIP.AUTO-MCNC: 170 MG/DL (ref 65–117)
GLUCOSE BLD STRIP.AUTO-MCNC: 98 MG/DL (ref 65–117)
GLUCOSE SERPL-MCNC: 116 MG/DL (ref 65–100)
GLUCOSE, GLC: 100 MG/DL
GLUCOSE, GLC: 107 MG/DL
GLUCOSE, GLC: 108 MG/DL
GLUCOSE, GLC: 112 MG/DL
GLUCOSE, GLC: 115 MG/DL
GLUCOSE, GLC: 116 MG/DL
GLUCOSE, GLC: 117 MG/DL
GLUCOSE, GLC: 122 MG/DL
GLUCOSE, GLC: 137 MG/DL
GLUCOSE, GLC: 98 MG/DL
HCO3 BLD-SCNC: 27.4 MMOL/L (ref 22–26)
HCO3 BLD-SCNC: 27.7 MMOL/L (ref 22–26)
HCT VFR BLD AUTO: 32.5 % (ref 35–47)
HGB BLD-MCNC: 10.2 G/DL (ref 11.5–16)
HIGH TARGET, HITG: 130 MG/DL
INSULIN ADMINSTERED, INSADM: 1.7 UNITS/HOUR
INSULIN ADMINSTERED, INSADM: 1.8 UNITS/HOUR
INSULIN ADMINSTERED, INSADM: 2.1 UNITS/HOUR
INSULIN ADMINSTERED, INSADM: 2.2 UNITS/HOUR
INSULIN ADMINSTERED, INSADM: 2.3 UNITS/HOUR
INSULIN ADMINSTERED, INSADM: 2.5 UNITS/HOUR
INSULIN ADMINSTERED, INSADM: 2.5 UNITS/HOUR
INSULIN ADMINSTERED, INSADM: 2.6 UNITS/HOUR
INSULIN ADMINSTERED, INSADM: 2.8 UNITS/HOUR
INSULIN ADMINSTERED, INSADM: 3.5 UNITS/HOUR
INSULIN ORDER, INSORD: 1.7 UNITS/HOUR
INSULIN ORDER, INSORD: 1.8 UNITS/HOUR
INSULIN ORDER, INSORD: 2.1 UNITS/HOUR
INSULIN ORDER, INSORD: 2.2 UNITS/HOUR
INSULIN ORDER, INSORD: 2.3 UNITS/HOUR
INSULIN ORDER, INSORD: 2.5 UNITS/HOUR
INSULIN ORDER, INSORD: 2.5 UNITS/HOUR
INSULIN ORDER, INSORD: 2.6 UNITS/HOUR
INSULIN ORDER, INSORD: 2.8 UNITS/HOUR
INSULIN ORDER, INSORD: 3.5 UNITS/HOUR
LOW TARGET, LOT: 95 MG/DL
MAGNESIUM SERPL-MCNC: 2.1 MG/DL (ref 1.6–2.4)
MCH RBC QN AUTO: 28.3 PG (ref 26–34)
MCHC RBC AUTO-ENTMCNC: 31.4 G/DL (ref 30–36.5)
MCV RBC AUTO: 90 FL (ref 80–99)
METHGB MFR BLD: 0.1 % (ref 0–1.4)
MINUTES UNTIL NEXT BG, NBG: 120 MIN
MINUTES UNTIL NEXT BG, NBG: 60 MIN
MULTIPLIER, MUL: 0.04
NRBC # BLD: 0 K/UL (ref 0–0.01)
NRBC BLD-RTO: 0 PER 100 WBC
O2/TOTAL GAS SETTING VFR VENT: 60 %
ORDER INITIALS, ORDINIT: NORMAL
OXYHGB MFR BLD: 71.3 % (ref 94–97)
PCO2 BLD: 51.3 MMHG (ref 35–45)
PCO2 BLD: 52.9 MMHG (ref 35–45)
PF4 HEPARIN CMPLX AB SER-ACNC: 0.07 OD (ref 0–0.4)
PH BLD: 7.32 (ref 7.35–7.45)
PH BLD: 7.34 (ref 7.35–7.45)
PLATELET # BLD AUTO: 43 K/UL (ref 150–400)
PO2 BLD: 188 MMHG (ref 80–100)
PO2 BLD: 95 MMHG (ref 80–100)
POTASSIUM SERPL-SCNC: 3.9 MMOL/L (ref 3.5–5.1)
PROT SERPL-MCNC: 5.1 G/DL (ref 6.4–8.2)
PROT SERPL-MCNC: 5.2 G/DL (ref 6.4–8.2)
RBC # BLD AUTO: 3.61 M/UL (ref 3.8–5.2)
SAO2 % BLD: 72 % (ref 95–99)
SAO2 % BLD: 96.5 % (ref 92–97)
SAO2 % BLD: 99.6 % (ref 92–97)
SERVICE CMNT-IMP: ABNORMAL
SERVICE CMNT-IMP: NORMAL
SODIUM SERPL-SCNC: 142 MMOL/L (ref 136–145)
SPECIMEN SITE: ABNORMAL
SPECIMEN TYPE: ABNORMAL
SPECIMEN TYPE: ABNORMAL
STATUS OF UNIT,%ST: NORMAL
UNIT DIVISION, %UDIV: 0
WBC # BLD AUTO: 16.2 K/UL (ref 3.6–11)

## 2023-02-09 PROCEDURE — 85027 COMPLETE CBC AUTOMATED: CPT

## 2023-02-09 PROCEDURE — 77010033678 HC OXYGEN DAILY

## 2023-02-09 PROCEDURE — 82375 ASSAY CARBOXYHB QUANT: CPT

## 2023-02-09 PROCEDURE — 97530 THERAPEUTIC ACTIVITIES: CPT

## 2023-02-09 PROCEDURE — 97162 PT EVAL MOD COMPLEX 30 MIN: CPT

## 2023-02-09 PROCEDURE — 97165 OT EVAL LOW COMPLEX 30 MIN: CPT

## 2023-02-09 PROCEDURE — 36415 COLL VENOUS BLD VENIPUNCTURE: CPT

## 2023-02-09 PROCEDURE — 83735 ASSAY OF MAGNESIUM: CPT

## 2023-02-09 PROCEDURE — C9113 INJ PANTOPRAZOLE SODIUM, VIA: HCPCS | Performed by: NURSE PRACTITIONER

## 2023-02-09 PROCEDURE — 80053 COMPREHEN METABOLIC PANEL: CPT

## 2023-02-09 PROCEDURE — 65610000003 HC RM ICU SURGICAL

## 2023-02-09 PROCEDURE — 80076 HEPATIC FUNCTION PANEL: CPT

## 2023-02-09 PROCEDURE — 82803 BLOOD GASES ANY COMBINATION: CPT

## 2023-02-09 PROCEDURE — 74011250637 HC RX REV CODE- 250/637: Performed by: NURSE PRACTITIONER

## 2023-02-09 PROCEDURE — 74011250636 HC RX REV CODE- 250/636: Performed by: STUDENT IN AN ORGANIZED HEALTH CARE EDUCATION/TRAINING PROGRAM

## 2023-02-09 PROCEDURE — 94660 CPAP INITIATION&MGMT: CPT

## 2023-02-09 PROCEDURE — 97161 PT EVAL LOW COMPLEX 20 MIN: CPT

## 2023-02-09 PROCEDURE — P9047 ALBUMIN (HUMAN), 25%, 50ML: HCPCS | Performed by: NURSE PRACTITIONER

## 2023-02-09 PROCEDURE — 74011000250 HC RX REV CODE- 250: Performed by: NURSE PRACTITIONER

## 2023-02-09 PROCEDURE — 71045 X-RAY EXAM CHEST 1 VIEW: CPT

## 2023-02-09 PROCEDURE — 74011250636 HC RX REV CODE- 250/636: Performed by: THORACIC SURGERY (CARDIOTHORACIC VASCULAR SURGERY)

## 2023-02-09 PROCEDURE — 74011636637 HC RX REV CODE- 636/637: Performed by: NURSE PRACTITIONER

## 2023-02-09 PROCEDURE — 36600 WITHDRAWAL OF ARTERIAL BLOOD: CPT

## 2023-02-09 PROCEDURE — 74011250636 HC RX REV CODE- 250/636: Performed by: NURSE PRACTITIONER

## 2023-02-09 PROCEDURE — 92610 EVALUATE SWALLOWING FUNCTION: CPT | Performed by: SPEECH-LANGUAGE PATHOLOGIST

## 2023-02-09 PROCEDURE — 74011000258 HC RX REV CODE- 258: Performed by: NURSE PRACTITIONER

## 2023-02-09 PROCEDURE — 94760 N-INVAS EAR/PLS OXIMETRY 1: CPT

## 2023-02-09 PROCEDURE — 82962 GLUCOSE BLOOD TEST: CPT

## 2023-02-09 PROCEDURE — 77030012390 HC DRN CHST BTL GTNG -B

## 2023-02-09 RX ORDER — ALBUMIN HUMAN 250 G/1000ML
12.5 SOLUTION INTRAVENOUS EVERY 6 HOURS
Status: COMPLETED | OUTPATIENT
Start: 2023-02-09 | End: 2023-02-10

## 2023-02-09 RX ORDER — HYDRALAZINE HYDROCHLORIDE 20 MG/ML
10 INJECTION INTRAMUSCULAR; INTRAVENOUS
Status: DISCONTINUED | OUTPATIENT
Start: 2023-02-09 | End: 2023-02-15 | Stop reason: HOSPADM

## 2023-02-09 RX ORDER — POTASSIUM CHLORIDE 29.8 MG/ML
20 INJECTION INTRAVENOUS ONCE
Status: COMPLETED | OUTPATIENT
Start: 2023-02-09 | End: 2023-02-09

## 2023-02-09 RX ORDER — INSULIN LISPRO 100 [IU]/ML
INJECTION, SOLUTION INTRAVENOUS; SUBCUTANEOUS
Status: DISCONTINUED | OUTPATIENT
Start: 2023-02-09 | End: 2023-02-10

## 2023-02-09 RX ORDER — TRAMADOL HYDROCHLORIDE 50 MG/1
50 TABLET ORAL
Status: DISCONTINUED | OUTPATIENT
Start: 2023-02-09 | End: 2023-02-10

## 2023-02-09 RX ORDER — SODIUM CHLORIDE 450 MG/100ML
50 INJECTION, SOLUTION INTRAVENOUS CONTINUOUS
Status: DISPENSED | OUTPATIENT
Start: 2023-02-09 | End: 2023-02-09

## 2023-02-09 RX ORDER — LIDOCAINE 4 G/100G
2 PATCH TOPICAL EVERY 24 HOURS
Status: DISCONTINUED | OUTPATIENT
Start: 2023-02-09 | End: 2023-02-15 | Stop reason: HOSPADM

## 2023-02-09 RX ADMIN — CHLORHEXIDINE GLUCONATE 10 ML: 1.2 RINSE ORAL at 09:38

## 2023-02-09 RX ADMIN — ONDANSETRON HYDROCHLORIDE 4 MG: 2 SOLUTION INTRAMUSCULAR; INTRAVENOUS at 00:25

## 2023-02-09 RX ADMIN — SODIUM CHLORIDE 50 ML/HR: 4.5 INJECTION, SOLUTION INTRAVENOUS at 18:25

## 2023-02-09 RX ADMIN — AMIODARONE HYDROCHLORIDE 0.5 MG/MIN: 50 INJECTION, SOLUTION INTRAVENOUS at 05:15

## 2023-02-09 RX ADMIN — MONTELUKAST 10 MG: 10 TABLET, FILM COATED ORAL at 21:39

## 2023-02-09 RX ADMIN — TRAMADOL HYDROCHLORIDE 50 MG: 50 TABLET, COATED ORAL at 12:35

## 2023-02-09 RX ADMIN — Medication 2 UNITS: at 19:02

## 2023-02-09 RX ADMIN — POTASSIUM CHLORIDE 20 MEQ: 29.8 INJECTION, SOLUTION INTRAVENOUS at 05:15

## 2023-02-09 RX ADMIN — FUROSEMIDE 40 MG: 10 INJECTION, SOLUTION INTRAMUSCULAR; INTRAVENOUS at 18:24

## 2023-02-09 RX ADMIN — SODIUM CHLORIDE, PRESERVATIVE FREE 10 ML: 5 INJECTION INTRAVENOUS at 21:40

## 2023-02-09 RX ADMIN — Medication 6 MG: at 21:38

## 2023-02-09 RX ADMIN — ATORVASTATIN CALCIUM 40 MG: 40 TABLET, FILM COATED ORAL at 21:39

## 2023-02-09 RX ADMIN — MAGNESIUM OXIDE 400 MG (241.3 MG MAGNESIUM) TABLET 400 MG: TABLET at 18:24

## 2023-02-09 RX ADMIN — ACETAMINOPHEN 1000 MG: 500 TABLET, FILM COATED ORAL at 12:35

## 2023-02-09 RX ADMIN — FUROSEMIDE 40 MG: 10 INJECTION, SOLUTION INTRAMUSCULAR; INTRAVENOUS at 09:33

## 2023-02-09 RX ADMIN — WATER 2 G: 1 INJECTION INTRAMUSCULAR; INTRAVENOUS; SUBCUTANEOUS at 01:52

## 2023-02-09 RX ADMIN — SODIUM CHLORIDE, PRESERVATIVE FREE 10 ML: 5 INJECTION INTRAVENOUS at 05:16

## 2023-02-09 RX ADMIN — SODIUM CHLORIDE 10 ML/HR: 4.5 INJECTION, SOLUTION INTRAVENOUS at 07:15

## 2023-02-09 RX ADMIN — OXYCODONE HYDROCHLORIDE 10 MG: 5 TABLET ORAL at 01:53

## 2023-02-09 RX ADMIN — MUPIROCIN: 20 OINTMENT TOPICAL at 09:38

## 2023-02-09 RX ADMIN — ACETAMINOPHEN 1000 MG: 500 TABLET, FILM COATED ORAL at 01:52

## 2023-02-09 RX ADMIN — ACETAMINOPHEN 1000 MG: 500 TABLET, FILM COATED ORAL at 06:46

## 2023-02-09 RX ADMIN — OXYCODONE HYDROCHLORIDE 10 MG: 5 TABLET ORAL at 06:46

## 2023-02-09 RX ADMIN — CHLORHEXIDINE GLUCONATE 10 ML: 1.2 RINSE ORAL at 18:34

## 2023-02-09 RX ADMIN — SODIUM CHLORIDE 40 MG: 9 INJECTION, SOLUTION INTRAMUSCULAR; INTRAVENOUS; SUBCUTANEOUS at 09:33

## 2023-02-09 RX ADMIN — SODIUM CHLORIDE, PRESERVATIVE FREE 10 ML: 5 INJECTION INTRAVENOUS at 15:16

## 2023-02-09 RX ADMIN — MUPIROCIN: 20 OINTMENT TOPICAL at 18:34

## 2023-02-09 RX ADMIN — ALBUMIN (HUMAN) 12.5 G: 0.25 INJECTION, SOLUTION INTRAVENOUS at 18:24

## 2023-02-09 RX ADMIN — SODIUM CHLORIDE 9 ML/HR: 9 INJECTION, SOLUTION INTRAVENOUS at 07:15

## 2023-02-09 RX ADMIN — HYDROMORPHONE HYDROCHLORIDE 0.5 MG: 1 INJECTION, SOLUTION INTRAMUSCULAR; INTRAVENOUS; SUBCUTANEOUS at 03:51

## 2023-02-09 NOTE — PROGRESS NOTES
Problem: Dysphagia (Adult)  Goal: *Acute Goals and Plan of Care (Insert Text)  Description: Initiated 2/9/2023  1. Patient will tolerate clears free of sequelae of aspiration within 7 days. 2. Patient will participate in further assessment of swallow function within 7 days. Outcome: Not Met   SPEECH LANGUAGE PATHOLOGY BEDSIDE SWALLOW EVALUATION  Patient: Katie Laird (13 y.o. female)  Date: 2/9/2023  Primary Diagnosis: Aortic stenosis, severe [I35.0]  Procedure(s) (LRB):  RIGHT FEMORAL CUTDOWN; RE-DO STERNOTOMY;  AORTIC VALVE REPLACEMENT (AVR) WITH 21MM INSPIRIS BIOPROSTHETIC VALVE; ASCENDING AORTIC ANEURYSM REPAIR WITH 28MM HEMASHIELD TUBE GRAFT; ECC; SHIVANI & EPIAORTIC US BY DR. Regan [facility] (N/A) 2 Days Post-Op   Precautions: fall, sternal       ASSESSMENT :  Based on the objective data described below, the patient presents with subjectively functional oropharyngeal swallow for thin liquids at this time. Adequate bolus extraction from straw for single and successive sips. No anterior loss. No overt s/s of aspiration after the swallow. Further consistencies not trialed, as patient has clears ordered. Suspect solids would be a safety hazard at this time given mentation. She was perseverative throughout the session and only intermittently looking at the therapist, otherwise staring ahead. Attention fleeting. Able to answer some orientation questions appropriately (name, day of the week, and place) and follow one step commands, redirection needed at times. She may benefit from further assessment of communication skills at some point. Patient will benefit from skilled intervention to address the above impairments. Patients rehabilitation potential is considered to be Fair     PLAN :  Recommendations and Planned Interventions:  Clears for now    Frequency/Duration: Patient will be followed by speech-language pathology 3 times a week to address goals. Discharge Recommendations:  To Be Determined SUBJECTIVE:   Patient stated Help, help, help.     OBJECTIVE:     Past Medical History:   Diagnosis Date    Adverse effect of anesthesia     MORPHINE - DIFFICULTY WAKING    Aneurysm ([facility] [address] 02/2023    AORTIC ANEURYSM    Aortic stenosis 05/04/2010    Arthritis     Asthma 05/04/2010    Congestive heart failure ([facility] [address]     heart disease    Elevated cholesterol 05/04/2010    elevated particle number    Ex-smoker 05/04/2010    Family history of diabetes mellitus (DM) 05/04/2010    Family history of early CAD 05/04/2010    GERD (gastroesophageal reflux disease)     Heart murmur     Hypertension     Ill-defined condition     ALLERGIC TO MOLD AND MILDEW    Osteopenia 05/04/2010    Stroke ([facility] 01/31/2023    TIA    Uterine fibroid 05/04/2010    Vitamin D deficiency 05/10/2010     Past Surgical History:   Procedure Laterality Date    ENDOSCOPY, COLON, DIAGNOSTIC  06/01/2004    due 6/14    HX AORTIC VALVE REPLACEMENT  2014    HX BREAST BIOPSY  06/01/2006    BENIGN    HX GI      COLONOSCOPY    HX GYN  10/01/2001    thermal ablation    HX PACEMAKER  07/18/2014    HX ROTATOR CUFF REPAIR Left 2021    KS UNLISTED PROCEDURE BREAST  01/01/1978    clogged milk duct    KS UNLISTED PROCEDURE CARDIAC SURGERY  01/01/2014    CARDIAC CATH          Diet prior to admission: suspect regular with thins  Current Diet:  clears   Cognitive and Communication Status:  Neurologic State: Alert  Orientation Level: Oriented to person, Oriented to place  Cognition: Follows commands  Perception: Cues to attend left visual field, Cues to attend to left side of body  Perseveration: Perseverates during conversation  Safety/Judgement: Decreased awareness of environment, Decreased awareness of need for assistance, Decreased awareness of need for safety, Decreased insight into deficits  Oral Assessment:  Oral Assessment  Labial: No impairment  Dentition: Natural;Intact  Oral Hygiene: moist, clean  Lingual: No impairment  Mandible: No impairment  P.O. Trials:  Patient Position: up in bed  Vocal quality prior to P.O.: No impairment  Consistency Presented: Thin liquid  How Presented: SLP-fed/presented;Straw;Successive swallows     Bolus Acceptance: No impairment  Bolus Formation/Control: No impairment     Propulsion: No impairment  Oral Residue: None        Aspiration Signs/Symptoms: None                      Pain:  Pain Scale 1: Adult Nonverbal Pain Scale  Pain Intensity 1: 3  Pain Location 1: Chest    After treatment:   Patient left in no apparent distress in bed, Call bell within reach, and Nursing notified    COMMUNICATION/EDUCATION:   Patient was educated regarding purpose of SLP visit. She participated. She did not appear to have capacity for further education given mental status. The patient's plan of care including recommendations, planned interventions, and recommended diet changes were discussed with: Registered nurse. Patient is unable to participate in goal setting and plan of care.     Thank you for this referral.  Shelley Bergman SLP  Time Calculation: 20 mins

## 2023-02-09 NOTE — DIABETES MGMT
[address] [facility]  DIABETES MANAGEMENT CONSULT    Consulted by Provider for advanced nursing evaluation and care for inpatient blood glucose management. Evaluation and Action Plan   Flakito Laureano is a 71 yo female s/p aortic repair and redo AVR repair for severe AS. Pre -diabetes per A1C 5.9%. Since patient is pre-diabetic anticipate patient will not require additional insulin other than correctional after insulin infusion discontinued. RN reported issues with low BG on 2/8- noted perfusion in fingers were not the best given vasopressor support, started venous draws for BG via A-line - BG trends today in target without low BG. Consistent hourly infusion rate <2 units /hour. Would be safe to discontinue insulin infusion and trend BG via venous draws from Inocencia Q6h until taking PO diet. Management Rationale Action Plan     1. Insulin infusion per post-operative protocol    2. Give Lantus 2 hrs prior transitioning off insulin gtt    3. Blood glucose monitoring ACHS once off insulin infusion. If glucose within goal, ok to trend on am labs and d/c correctional insulin. Diabetes Discharge Plan   Medication     Referral  []        Outpatient diabetes education   Additional orders            Initial Presentation   Flakito Laureano is a 72 y.o. female admitted 2/7/23 s/p AVR (redo) with aortic repair for severe AS. Per report procedure technically difficult with significant adhesion and friability of tissue with associated significant blood loss. Intra op SHIVANI w/ EF 35%.       HX:   Past Medical History:   Diagnosis Date    Adverse effect of anesthesia     MORPHINE - DIFFICULTY WAKING    Aneurysm ([facility] [address] 02/2023    AORTIC ANEURYSM    Aortic stenosis 05/04/2010    Arthritis     Asthma 05/04/2010    Congestive heart failure ([address]     heart disease    Elevated cholesterol 05/04/2010    elevated particle number    Ex-smoker 05/04/2010    Family history of diabetes mellitus (DM) 05/04/2010    Family history of early CAD 05/04/2010    GERD (gastroesophageal reflux disease)     Heart murmur     Hypertension     Ill-defined condition     ALLERGIC TO MOLD AND MILDEW    Osteopenia 05/04/2010    Stroke ([facility] [address] 01/31/2023    TIA    Uterine fibroid 05/04/2010    Vitamin D deficiency 05/10/2010        INITIAL DX:   Aortic stenosis, severe [I35.0]     Current Treatment     TX: post op care/ vasopressor support/ insulin infusion    Hospital Course   Clinical progress has been complicated by post op Acute hypoxemic, hypercapnic respiratory failure/ Acute on chronic HFrEF. Also had intraoperative significant blood loss-4500cc. 2/7/23: admitted/ s./p redo AVR repair with arrest in OR  2/8-2/9: extubated 2/8- LUE weakness noted/ delirium. metabolic encephalopathy/ SLP to assess swallow before diet advanced. Diabetes History   Pre-diabetes, per A1C 5.8%. Diabetes Medication History  Key Antihyperglycemic Medications       Patient is on no antihyperglycemic meds. Reducing risks  [] Influenza: There is no immunization history for the selected administration types on file for this patient. [] Pneumonia:   Immunization History   Administered Date(s) Administered    Pneumococcal Polysaccharide (PPSV-23) 07/20/2014     [] Hepatitis: There is no immunization history for the selected administration types on file for this patient. Subjective   Patient noted to be resting in bed at time of visit. Objective   Physical exam  General Obese female-ill appearing   Neuro  Some delirium  Vital Signs Visit Vitals  BP (!) 143/81   Pulse 80   Temp 99.1 °F (37.3 °C)   Resp 11   Ht 5' (1.524 m)   Wt 85.5 kg (188 lb 7.9 oz)   SpO2 96%   BMI 36.81 kg/m²     Skin  Warm and dry. Heart   Regular rate and rhythm.  No murmurs, rubs or gallops  Lungs  Clear to auscultation without rales or rhonchi  Extremities No foot wounds     Laboratory  Recent Labs     02/09/23  0354 02/08/23  1930 02/08/23  0405   GLU 116* 140* 137*   AGAP 7 6 7   WBC 16.2* 12.5* 11.4*   CREA 0.66 0.60 0.64   AST 40*  38* 43* 51*   ALT 16  15 14 19         Factors impacting BG management  Factor Dose Comments   Nutrition:  Standard meals-NPO         Drugs:  Vasopressor cat  Blood transfusion(s)   On/off  Intra operative PRBC transfusion     Affects insulin delivery  A1cs inaccurate-for following 3mo     Pain Post op prn    Infection WBC 11.4    Other:   Kidney function  Liver function     eGFR >60      Blood glucose pattern    Significant diabetes-related events over the past 24-72 hours  Pre diabetes, A1C 5.8%  Insulin infusion per CTS protocol   Insulin requirement  2/7: 38 units  2/8 80.6 units       Assessment and Nursing Intervention   Nursing Diagnosis Risk for unstable blood glucose pattern   Nursing Intervention Domain 5250 Decision-making Support   Nursing Interventions Examined current inpatient diabetes/blood glucose control   Explored factors facilitating and impeding inpatient management  Explored corrective strategies with patient and responsible inpatient provider   Informed patient of rational for insulin strategy while hospitalized     Nursing Diagnosis 36045 Ineffective Health Management   Nursing Intervention Domain 5250 Decision-making Support   Nursing Interventions Identified diabetes self-management practices impeding diabetes control  Discussed diabetes survival skills related to  Healthy Plate eating plan; given handouts  Role of physical activity in improving insulin sensitivity and action  Procedure for blood glucose monitoring & options for low-cost products  Medications plan at discharge     Billing Code(s)   No charge    Before making these care recommendations, I personally reviewed the hospitalization record, including notes, laboratory & diagnostic data and current medications, and examined the patient at the bedside (circumstances permitting) before determining care.  More than fifty (50) percent of the time was spent in patient counseling and/or care coordination.   Total minutes: <15    MARY KAY Crane  Diabetes Clinical Nurse Specialist  [facility]  Access via [facility]olfo Byrne

## 2023-02-09 NOTE — PROGRESS NOTES
[facility] ICU Progress Note    Admit Date: 2023  POD:  1 Day Post-Op    23    Procedure:  Procedure(s):  RIGHT FEMORAL CUTDOWN; RE-DO STERNOTOMY;  AORTIC VALVE REPLACEMENT (AVR) WITH 21MM INSPIRIS BIOPROSTHETIC VALVE; ASCENDING AORTIC ANEURYSM REPAIR WITH 28MM HEMASHIELD TUBE GRAFT; ECC; SHIVANI & EPIAORTIC US BY DR. ERNANDEZ [facility]       SHIVANI 23 DURING SURGERY  Indications: assessment of ascending aorta and assessment of surgical repair  Modalities: 2D, CF, CWD, contrast, PWD  Probe Type: multiplane  Insertion: atraumatic  Patient Status: intubated    Complications: None  Comments: Pre SHIVANI  SHIVANI views are distorted as the hear is rotated. LV 35-40%, LVH , septal and inf walls down. RV normal.  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 Post SHIVANI  LV fn preserved, ef 50%. RV normal  Bioprosthetic AV in situ, no AI, NO PVL, asc aorta repaired by tube graft. Mean gradient 6 mm across the AV. MR 2+,   LV fn came down to pre op level of ef 35%, same wall motion problems as before. Subjective/24hr events:   Pt seen with Dr. Jackie Lewis, Extubated yesterday evening, concern for left upper extremity weakness, discussed with Dr. Man Galvan yesterday, no intervention at that time and re-evaluate once extubated. Patient with gross motor function of left arm, some delirium this am, oriented to self, place,  Epi off, dobutamine decreased to 3mcg/kg/min, some nausea last pm, medicated with zofran, PPM interogated yesterday, no issues, very weak, low urine output overnight     IV infusions:  Amiodarone 0.5 mg/min  Precedex off  Dobutamine 3 mcg/kg/min  Epinephrine off  Insulin 2.6 units/hr    ABG  pH 7.32  pCO2 52.9  pO2 95  HCO3 27.4  sO2  96.5     Objective:       Vitals:  Blood pressure (!) 125/46, pulse 67, temperature 99.4 °F (37.4 °C), resp. rate 15, height 5' (1.524 m), weight 188 lb 7.9 oz (85.5 kg), SpO2 94 %.   Temp (24hrs), Av.2 °F (37.3 °C), Min:98.1 °F (36.7 °C), Max:99.8 °F (37.7 °C)    Hemodynamics:   CO: CO (l/min): 4.9 l/min   CI: CI (l/min/m2): 2.8 l/min/m2   CVP: CVP (mmHg): 11 mmHg (02/09/23 0700)   SVR: SVR (dyne*sec)/cm5: 1080 (dyne*sec)/cm5 (02/09/23 8779)   PAP Systolic: PAP Systolic: 33 (88/54/88 2487)   PAP Diastolic: PAP Diastolic: 12 (23/13/73 8882)   PVR:     SV02: SVO2 (%): 68 % (02/09/23 0700)   SCV02:      EKG/Rhythm:  AV paced with PPM    Extubation Date / Time: extubated 2/8/23 1646    CT Output: 270/410, total 680, no air leak, on suction, serosanguinous drainage    Oxygen Therapy:  Oxygen Therapy  O2 Sat (%): 94 % (02/09/23 0700)  Pulse via Oximetry: 64 beats per minute (02/09/23 0356)  O2 Device: Nasal cannula;Humidifier (02/09/23 0400)  Skin Assessment: Clean, dry, & intact (02/08/23 0400)  Skin Protection for O2 Device: Yes (02/08/23 0400)  Orientation: Bilateral (02/08/23 0400)  Location: Cheek (02/08/23 0400)  Interventions: Hydrocolloid Dressing (02/08/23 0400)  O2 Flow Rate (L/min): 4 l/min (02/09/23 0400)  O2 Temperature:  (HME) (02/08/23 0335)  FIO2 (%): 40 % (02/08/23 1535)    CXR: right hemidiaphragm elevation on today's image    CXR Results  (Last 48 hours)                 02/09/23 0519  XR CHEST PORT Final result    Impression:  Small left pleural effusion/basilar atelectasis. Post extubation. Narrative:  INDICATION: post op heart       EXAMINATION:  AP CHEST, PORTABLE       COMPARISON: 2/8/2023       FINDINGS: Single AP portable view of the chest demonstrates interval removal of   endotracheal and nasogastric tubes. Hawkins-Lilian catheter and pleural/mediastinal   drains remain in place. The cardiomediastinal silhouette is unchanged. No   pneumothorax. Small left pleural effusion and basilar atelectasis, unchanged. No   pulmonary edema or new airspace disease. 02/08/23 0509  XR CHEST PORT Final result    Impression:  No significant change.        Narrative:  INDICATION: post op heart       EXAMINATION:  AP CHEST, PORTABLE       COMPARISON: 2/7/2023       FINDINGS: Single AP portable view of the chest demonstrates no change in   position of the lines and tubes. The cardiomediastinal silhouette is unchanged. Persistent left basilar atelectasis. No pulmonary edema or pneumothorax. 02/07/23 1636  XR CHEST PORT Final result    Impression:  Multiple tubes and lines in satisfactory position. No pneumothorax. Mild left   basilar atelectasis. Narrative:  INDICATION:    post op heart        EXAMINATION:  AP CHEST, PORTABLE       COMPARISON: January 31       FINDINGS: Single AP portable view of the chest at 1628 hours demonstrates   intubation and placement of an NG tube, both in satisfactory position. There is   interval median sternotomy. Left-sided pacer device is not significantly   changed. There is a right IJ approach [place]-Lilian catheter, tip in the main   pulmonary outflow tract. There is a right-sided chest tube. There is no evidence   of pneumothorax. There is mild left basilar atelectasis. The cardiomediastinal   silhouette is stable. There is no new airspace disease. Admission Weight: Last Weight   Weight: 170 lb 3.1 oz (77.2 kg) Weight: 188 lb 7.9 oz (85.5 kg)     Intake / Output / Drain:  Current Shift: No intake/output data recorded.   Last 24 hrs.:   Intake/Output Summary (Last 24 hours) at 2/9/2023 0727  Last data filed at 2/9/2023 0700  Gross per 24 hour   Intake 2164.96 ml   Output 2365 ml   Net -200.04 ml       EXAM:  General: extubated, lethargic, ill appearing  Lung: CTA apical, diminished bibasilar, poor effort,   Incision: dry and intact, no drainage, sternum stable, left groin incision no erythema, hematoma or drainage, ecchymosis present  Heart: s1s2, reg rate and rhy, paced with ppm  Abdomen: hypoactive, soft, non-tender, no bruits  Extremities: warm, well perfused, +2 DP bilaterally, toes with slight mottling, mottling worse on left than right, cool to palpation  Neurologic: lethargic, repetitively stating thank you. Follows simple commands, motor/sensory intact to right upper and bilateral lower extremities, able to move shoulder on left arm, lift off bed at times, minimal resistance to gravity    Labs:   Recent Labs     02/09/23  0717 02/09/23  0356 02/09/23  0354 02/07/23  1558 02/07/23  1553   WBC  --   --  16.2*   < > 16.4*   HGB  --   --  10.2*   < > 14.0   HCT  --   --  32.5*   < > 43.3   PLT  --   --  43*   < > 80*   NA  --   --  142   < > 145   K  --   --  3.9   < > 3.8   BUN  --   --  20   < > 10   CREA  --   --  0.66   < > 0.48*   GLU  --   --  116*   < > 130*   GLUCPOC 112   < >  --    < >  --    INR  --   --   --   --  1.4*    < > = values in this interval not displayed. Assessment:     Active Problems: Aortic stenosis, severe (2/7/2023)      S/P aortic valve repair (2/7/2023)      Overview: RIGHT FEMORAL CUTDOWN; RIGHT FEMORAL ARTERIAL CANNULATION,  RE-DO       STERNOTOMY;  HYPOTHERMIC ARREST, ANTEGRADE CEREBRAL PERFUSION VIA       INNOMINATE ARTERY, REDO AORTIC VALVE REPLACEMENT (AVR) WITH 21MM INSPIRIS       BIOPROSTHETIC VALVE; ASCENDING AORTIC ANEURYSM REPAIR WITH 28MM HEMASHIELD       TUBE GRAFT; REPAIR AORTIC TEAR, PULMONARY ARTERY REPAIR, RIGHT ATRIAL       REPAIR, ECC; SHIVANI & EPIAORTIC US BY DR. ERNANDEZ [facility]       Plan/Recommendations/Medical Decision Making:     Ascending aortic aneurysm, end of life bioprosthetic aortic valve s/p redo sternotomy, redo aortic valve replacement, repair ascending aortic aneurysm with tube graft, left femoral cutdown and arterial cannulation on 2/7/23.  Will stop amiodarone drip today, transition to po prophylaxis for atrial fibrillation once able to swallow  Cardiogenic shock s/p redo sternotomy, redo AVR currently on dobutamine 3 mcg/kg/min, EPI off, maintaining CI around 2.2, continue to wean dobutamine as tolerated, will increase rate of PPM to 80 bpm  Acute respiratory failure with hypoxemia s/p redo sternotomy, redo AVR, extubated yesterday at 12 pm, on nasal cannula, poor effort, decreased mentation, will get abg this am to r/o hypercapnia, pCO2 52, discussed in rounds, add bipap  Post operative delirium s/p hypothermic arrest, mentation more clear last pm at extubation, multifactorial secondary to pain medication, prolonged bypass run. Will rule out hypercapnia, no significant changes to lft's  Left arm weakness unknown etiology, able to grossly move shoulder and lift at elbow, no motor function at hand, unsure if motor deficit or secondary to delirium, appears to move all other extremities on command, discussed with rounding team, Concern for TIA prior to surgery: Patient seen in ED 1 week ago, all testing discussed. Per Neurology is safe to proceed with surgery, they favor it possibly being amaurosis fugax. Per Discharge Summary from hospitalist, consider ASA and plavix at discharge. Mixed cardiac and non-cardiac fluid overload secondary to hemodynamic instability and cardiogenic shock, nephrocheck 1.28, will repeat today to trend down, received 2 doses diuretic yesterday with increased output, then out put reduced again overnight, currently on lasix 40 mg BID  Post operative coagulopathy secondary to prolonged cardiopulmonary bypass, currently stable, received multiple blood products to stabilize. Resolved, hgb slight trend down from yesterday  Acute blood loss anemia, stable, monitor daily  Thrombocytopenia, plt 43,000,transfused this am, HIT panel sent, results pending, avoid heparin containing products  CHFrEF: EF 45% on 2/1/23, Non-preserved EF heart failure with documented EF 35-45% via SHIVANI in surgery, currently in cardiogenic shock, no BB, ACEI, ARB, Diuretic at this time, will re-evaluate once more hemodynamically stable.   Hx hypertension, PTA losartan 50mg daily - last dose 2/4  Hx hyperlipidemia, PTA atorvastatin 40mg daily  Hx complete heart block r/t post op AVR: [employer] pacemaker implanted in 2014 (with lead revision) patient states has 8 months of battery left on pacemaker. PPM interrogated and functioning properly, will increase rate today for improved cardiac output. Asthma: No inhalers noted, hx smoker. PFTs above, baseline ABG pending. GERD: PTA omeprazole 40mg daily. Currently on protonix 40 mg IV will transition to po once swallowing cleared. Post operative debility secondary to open heart surgery, will start therapy once more hemodynamically stable, PT/OT to assist with sitting at bedside, possible up to chair later today if remains hemodynamically stable. Disposition: continue ICU cares, discussed progress with daughter, will meet with  later today, bipap in place and tolerating, will use narcotics sparingly. If continues concern with mentation and left arm movement will obtain CT head.     Signed By: KASSIE Cortez

## 2023-02-09 NOTE — PROGRESS NOTES
Physical Therapy:  2/9/2023    Orders received and chart reviewed in preparation for PT evaluation. Spoke with NP who requests PT eval defer until this afternoon to allow patient more recovery time prior to initiation of mobility. Will hold PT evaluation until this afternoon for re-attempt as able.     Thank you,  Castillo Sadler, PT, DPT

## 2023-02-09 NOTE — PROGRESS NOTES
0900: Amiodarone gtt off, to BiPap, 40% and 10/5    1250: [employer] rep in to increase pacer rate to 80. Dobutamine to 2 mcg    1315: dobutamine off    1400: to 6 L NC    1445: insulin gtt off per Diabetic educator. .45 NS to 50 cc's per NP    2000: Bedside shift change report given to [address] [place] [address] (oncoming nurse) by Martín Wallace RN (offgoing nurse). Report included the following information SBAR, Kardex, Intake/Output, MAR, and Recent Results.

## 2023-02-09 NOTE — PROGRESS NOTES
2000: Bedside shift change report given to Yoana LEON RN/Sari HOUSE RN (oncoming nurse) by Kim Chopra RN (offgoing nurse). Report included the following information SBAR, Intake/Output, MAR, Recent Results, Cardiac Rhythm AV paced, and Alarm Parameters . 2032Silvester Band MD called to discuss plan of care and notify of PLT results-no orders for platelets at this time, recheck lab at 0400. Discussed need for arterial line placement to verify BG while on insulin gtt and frequent labs-plan to place    2100: pt more awake, slight movement in LUE noted with generalized weakness in all extremities    2225: arterial line placed, MAPs lower on art line than NIBP, plan to primarily use NIBP for BP management    0800: Bedside shift change report given to Janice Angela RN (oncoming nurse) by Kim Chopra RN/Sari HOUSE RN (offgoing nurse). Report included the following information SBAR, Intake/Output, MAR, Recent Results, Cardiac Rhythm AV paced, and Alarm Parameters . Shift summary: weaned dobuta to 3, tolerating well.  Continued echolalia throughout shift, intermittently able to move LUE    I have reviewed and agree with Cleveland Stover RN charting, assessment, and med admin

## 2023-02-09 NOTE — PROGRESS NOTES
Cardiac Surgery Coordinator- Placed update call to Mr Amaury Tuttle, reviewed plan of care and provided clinical update. He stated he will be in later today. Spoke to [employer], he will be in later this morning to increase her HR.

## 2023-02-09 NOTE — PROGRESS NOTES
Occupational Therapy:   2/9/2023    Orders received and chart reviewed in preparation for OT evaluation. Spoke with NP who requests OT eval defer until this afternoon to allow patient more recovery time prior to initiation of mobility. Will hold OT evaluation until this afternoon for re-attempt as able.       Thank you,   Luisito Mukherjee, OTD, OTR/L

## 2023-02-09 NOTE — PROGRESS NOTES
Problem: Self Care Deficits Care Plan (Adult)  Goal: *Acute Goals and Plan of Care (Insert Text)  Description: FUNCTIONAL STATUS PRIOR TO ADMISSION: Pt unable to provide PLOF at time of evaluation d/t impaired cognition. Per chart review pt is independent and active without use of DME.      HOME SUPPORT: The patient lived with her  Alfonso Rodriguez but did not require assist with ADL tasks. Per chart, pt is still working in administration. Occupational Therapy Goals  Initiated 2/9/2023  1. Patient will perform basic grooming routine in supported sitting with minimal assistance within 7 day(s). 2.  Patient will perform anterior neck to thigh bathing with moderate assistance within 7 day(s). 3.  Patient will perform upper body dressing with moderate assistance within 7 day(s). 4.  Patient will perform lateral rolling in prep for completion of toileting routine with moderate assistance within 7 day(s). 5.  Patient will participate in upper extremity therapeutic exercise/activities with supervision and visual/verbal cues for 5 minutes within 7 day(s). Outcome: Not Met   OCCUPATIONAL THERAPY EVALUATION  Patient: Ophelia Kraus (56 y.o. female)  Date: 2/9/2023  Primary Diagnosis: Aortic stenosis, severe [I35.0]  Procedure(s) (LRB):  RIGHT FEMORAL CUTDOWN; RE-DO STERNOTOMY;  AORTIC VALVE REPLACEMENT (AVR) WITH 21MM INSPIRIS BIOPROSTHETIC VALVE; ASCENDING AORTIC ANEURYSM REPAIR WITH 28MM HEMASHIELD TUBE GRAFT; ECC; SHIVANI & EPIAORTIC US BY DR. Shereen Sunshine (N/A) 2 Days Post-Op   Precautions: fall, move in the tube       ASSESSMENT  Based on the objective data described below, the patient presents with impaired cognition (orientation, attention, command following, processing speed), activity tolerance, generalized weakness (LUE with > impairment than R), and post-operative pain s/p admission for re-do sternotomy, AVR, AAA repair, and femoral cutdown POD2. Pt received semi-supine, on BiPAP and RN present.  Pt A&O to first name only and perseverative throughout all of session requiring frequent redirection. LUE shoulder and elbow ROM/strength generally decreased but functional, however hand/wrist ROM/strength grossly decreased. Lateral rolling completed with total A x2 and max multimodal cues. Presented pt with rote ADL task and able to complete with mod A, proximal stability, and multimodal cues for thoroughness. Repositioned pt for comfort and left in care of RN. Anticipate pt will require rehab at d/c as she is well below her functional baseline, however formal recommendation pending pt progress with acute OT. Current Level of Function Impacting Discharge (ADLs/self-care): mod to total A     Functional Outcome Measure: The patient scored 0/100 on the Barthel Index outcome measure which is indicative of total dependence in basic self care. Other factors to consider for discharge: PLOF, cognition     Patient will benefit from skilled therapy intervention to address the above noted impairments. PLAN :  Recommendations and Planned Interventions: self care training, functional mobility training, therapeutic exercise, balance training, therapeutic activities, endurance activities, patient education, and home safety training    Frequency/Duration: Patient will be followed by occupational therapy 5 times a week to address goals. Recommendation for discharge: (in order for the patient to meet his/her long term goals)  To be determined: pending progress with acute OT, anticipate she will require rehab    This discharge recommendation:  Has been made in collaboration with the attending provider and/or case management    IF patient discharges home will need the following DME: TBD       SUBJECTIVE:   Patient stated Corine Mandujano you. . . Thank you.     OBJECTIVE DATA SUMMARY:   HISTORY:   Past Medical History:   Diagnosis Date    Adverse effect of anesthesia     MORPHINE - DIFFICULTY WAKING    Aneurysm ([facility] [address] 02/2023 AORTIC ANEURYSM    Aortic stenosis 05/04/2010    Arthritis     Asthma 05/04/2010    Congestive heart failure ([facility] [address]     heart disease    Elevated cholesterol 05/04/2010    elevated particle number    Ex-smoker 05/04/2010    Family history of diabetes mellitus (DM) 05/04/2010    Family history of early CAD 05/04/2010    GERD (gastroesophageal reflux disease)     Heart murmur     Hypertension     Ill-defined condition     ALLERGIC TO MOLD AND MILDEW    Osteopenia 05/04/2010    Stroke ([facility] [address] 01/31/2023    TIA    Uterine fibroid 05/04/2010    Vitamin D deficiency 05/10/2010     Past Surgical History:   Procedure Laterality Date    ENDOSCOPY, COLON, DIAGNOSTIC  06/01/2004    due 6/14    HX AORTIC VALVE REPLACEMENT  2014    HX BREAST BIOPSY  06/01/2006    BENIGN    HX GI      COLONOSCOPY    HX GYN  10/01/2001    thermal ablation    HX PACEMAKER  07/18/2014    HX ROTATOR CUFF REPAIR Left 2021    AL UNLISTED PROCEDURE BREAST  01/01/1978    clogged milk duct    AL UNLISTED PROCEDURE CARDIAC SURGERY  01/01/2014    CARDIAC CATH       Expanded or extensive additional review of patient history:          Hand dominance: Right    EXAMINATION OF PERFORMANCE DEFICITS:  Cognitive/Behavioral Status:  Neurologic State: Drowsy  Orientation Level: Disoriented to time;Disoriented to situation;Disoriented to place;Oriented to person (oriented to first name only)  Cognition: Decreased command following;Decreased attention/concentration  Perception: Cues to attend left visual field;Cues to attend to left side of body  Perseveration: Perseverates during conversation  Safety/Judgement: Decreased awareness of environment;Decreased awareness of need for assistance;Decreased awareness of need for safety;Decreased insight into deficits    Skin: visually intact     Edema: global edema, LUE with increased edema     Hearing:   Auditory  Auditory Impairment: Hard of hearing, bilateral    Vision/Perceptual:    Tracking: Unable to test secondary due to decreased visual attention                                Range of Motion:    AROM: Generally decreased, functional (LUE with > impairment distally)                         Strength:    Strength: Generally decreased, functional (LUE with > impairment distally)                Coordination:  Coordination: Generally decreased, functional            Tone & Sensation:    Tone: Normal  Sensation: Impaired                      Balance:  Sitting: Impaired  Sitting - Static: Poor (constant support) (supported sitting, bed in chair position)    Functional Mobility and Transfers for ADLs:  Bed Mobility:  Rolling: Total assistance;Assist x2  Supine to Sit: Total assistance (bed mechanics used)  Sit to Supine: Total assistance (bed mechanics used)  Scooting: Total assistance;Assist x2 (to [facility])    Transfers:       ADL Assessment:  Feeding: Moderate assistance;Maximum assistance (inferred, pt NPO)    Oral Facial Hygiene/Grooming: Moderate assistance (in supported sitting)    Bathing: Total assistance    Type of Bath: Chlorhexidine (CHG)    Upper Body Dressing: Total assistance    Lower Body Dressing: Total assistance    Toileting: Total assistance                ADL Intervention and task modifications:       Grooming  Grooming Assistance: Moderate assistance (with JANIA CHAMBERLAIN assist to initiate task, multimodal cues for thoroughness)  Position Performed:  (bed in modified chair position)  Washing Face: Moderate assistance;Proximal stability (with RUE, [place] assist to initiate task, multimodal cues for thoroughness)  Cues: Verbal cues provided;Visual cues provided; Tactile cues provided;Physical assistance              Cognitive Retraining  Orientation Retraining: Awareness of environment;Person;Place; Reorienting;Situation;Time  Following Commands:  Follows one step commands/directions (intermittently)  Safety/Judgement: Decreased awareness of environment;Decreased awareness of need for assistance;Decreased awareness of need for safety;Decreased insight into deficits  Cues: Tactile cues provided;Verbal cues provided;Visual cues provided        Functional Measure:    Barthel Index:  Bathin  Bladder: 0  Bowels: 0  Groomin  Dressin  Feedin  Mobility: 0  Stairs: 0  Toilet Use: 0  Transfer (Bed to Chair and Back): 0  Total: 0/100      The Barthel ADL Index: Guidelines  1. The index should be used as a record of what a patient does, not as a record of what a patient could do. 2. The main aim is to establish degree of independence from any help, physical or verbal, however minor and for whatever reason. 3. The need for supervision renders the patient not independent. 4. A patient's performance should be established using the best available evidence. Asking the patient, friends/relatives and nurses are the usual sources, but direct observation and common sense are also important. However direct testing is not needed. 5. Usually the patient's performance over the preceding 24-48 hours is important, but occasionally longer periods will be relevant. 6. Middle categories imply that the patient supplies over 50 per cent of the effort. 7. Use of aids to be independent is allowed. Score Interpretation (from [address] [address])    Independent   60-79 Minimally independent   40-59 Partially dependent   20-39 Very dependent   <20 Totally dependent     -Chaz Diaz., Barthel, D.W. (1965). Functional evaluation: the Barthel Index. [facility] ([address] (1997). The Barthel activities of daily living index: self-reporting versus actual performance in the old (> or = 75 years). Journal of [address] [place] [address] 45(7), [facility], [facility], [facility] [place], Desean Arroyo. (). Measuring the change in disability after inpatient rehabilitation; comparison of the responsiveness of the Barthel Index and Functional [place] Measure.  Journal of Neurology, Neurosurgery, and Psychiatry, 66(8), 279-039. DAVID Stevens, CINDY Mead, & Bere Quigley M.A. (2004) Assessment of post-stroke quality of life in cost-effectiveness studies: The usefulness of the Barthel Index and the EuroQoL-5D. Quality of Life Research, 15, 568-72       Occupational Therapy Evaluation Charge Determination   History Examination Decision-Making   LOW Complexity : Brief history review  LOW Complexity : 1-3 performance deficits relating to physical, cognitive , or psychosocial skils that result in activity limitations and / or participation restrictions  MEDIUM Complexity : Patient may present with comorbidities that affect occupational performnce. Miniml to moderate modification of tasks or assistance (eg, physical or verbal ) with assesment(s) is necessary to enable patient to complete evaluation       Based on the above components, the patient evaluation is determined to be of the following complexity level: LOW   Pain Rating:  When directly asked/prompted pt stating \"yes\" that she has pain however unable to rate, RN aware     Activity Tolerance:   Poor    After treatment patient left in no apparent distress:    Call bell within reach and Side rails x 3, bed in modified chair position     COMMUNICATION/EDUCATION:   The patients plan of care was discussed with: Physical therapist, Registered nurse, and Physician. Patient/family have participated as able in goal setting and plan of care. and Patient/family agree to work toward stated goals and plan of care. This patients plan of care is appropriate for delegation to [facility].     Thank you for this referral.  PARIS Davis, OTR/L  Time Calculation: 21 mins

## 2023-02-09 NOTE — PROGRESS NOTES
CRITICAL CARE NOTE      Name: Marta Dockery   : 1950   MRN: [id number]   Date: 2023      Reason for ICU Admission: s/p redo AVR and aortic repair     ICU PROBLEM LIST   Severe AS s/p redo AVR and aortic repair   Acute hypoxemic, hypercapnic respiratory failure  Acute on chronic HFrEF  Post op CHB S/p PPM  COPD    HISTORY OF PRESENT ILLNESS:   Pt is a 72 y.o F w/ PMH as noted below who presents to CVICU post planned redo AVR and aortic repair. Per report procedure technically difficult with significant adhesion and friability of tissue with associated significant blood loss. Given DDAVP, Kcentra, protamine and blood product as noted below. Intra op SHIVANI w/ EF 35%. Arrived to CVICU sedated intubated, on precedex, amio and insulin. Paced rhythm from underlying PPM. Started on epi ggt due to decreased CI. Post op CXR w/ LLL atelectasis vs congestion, otherwise expected post op changes. 24 HOUR EVENTS:   Extubated, weaned off epi, remains on dobutamine at 3 this AM, plan to increase PPM rate to aid CI/CO. POCUS w/ LV EF 35-40%, RV dilation w/ TAPSE 13-15. CXR w/ poor effort, mild increased edema, mild hypercarpnia this AM w/ pt more somnolent. C/w diuresis and place on BiPAP. LUE weakness improving. CT w/ 720ml output past 24hrs.      NEUROLOGICAL:    Monitor mentation while on PPV  PRN pain regimen  Delirium precautions  PT/OT  Monitor for focal neuro changes    PULMONOLOGY:   Start on BiPAP, more somnolent this AM  Monitor gases  Diuresis to aid above  IS, PFV, OOBTC once extubated    CARDIOVASCULAR:   Dobutamine gtt, increase PPM rate  CI >2, MAP >65,   C/w Lasix   Pacer wire and chest tube management per CTS  Monitor drain output  Telemetry monitoring    GASTROINTESTINAL:   Pepcid  NPO   ADAT once more awake    RENAL/ELECTROLYTE/FLUIDS:   Strict I/Os  RFP  Replace lytes PRN    ENDOCRINE:   Insulin gtt per protocol  Glucose 120-180    HEMATOLOGY/ONCOLOGY:   Monitor coags, hgb goal >7  SCDs  Plt 43 this AM, HIT vs dilutional/consumptive as pt w/ near blood volume exchange 2/2 intraop bleeding  Send HIT workup    ID/MICRO:   Periop ancef    ICU DAILY CHECKLIST     Code Status:full  DVT Prophylaxis:SCDs  T/L/D: PIV, MAC/PAC, V wire, Tyler x4, sonia kaufman  SUP: pepcid  Diet: adat  Activity Level:ad gianni  ABCDEF Bundle/Checklist Completed:Yes  Disposition: Stay in ICU  Multidisciplinary Rounds Completed:  Yes  Patient/Family Updated: yes    [facility] [address]:   As above    Review of Systems:     Review of Systems   Unable to perform ROS: Mental status change      OBJECTIVE:     Labs and Data: Reviewed 02/09/23  Medications: Reviewed 02/09/23  Imaging: Reviewed 02/09/23    Physical Exam  Vitals and nursing note reviewed. Constitutional:       Appearance: Normal appearance. She is obese. Comments: Somnolent but rouses to voice   HENT:      Head: Normocephalic and atraumatic. Nose: Nose normal. No congestion. Mouth/Throat:      Mouth: Mucous membranes are moist.      Pharynx: Oropharynx is clear. No oropharyngeal exudate. Eyes:      General: No scleral icterus. Extraocular Movements: Extraocular movements intact. Conjunctiva/sclera: Conjunctivae normal.      Pupils: Pupils are equal, round, and reactive to light. Neck:      Comments: [facility] PAC  Cardiovascular:      Rate and Rhythm: Normal rate. Pulses: Normal pulses. Heart sounds: Normal heart sounds. No murmur heard. No gallop. Comments: PPM pocket benign, V wire, mediastinal drain x2, post sternotomy, site C/D/I  Pulmonary:      Effort: Pulmonary effort is normal. No respiratory distress. Breath sounds: Normal breath sounds. No wheezing or rales. Comments: bilateral pleural chest tubes  Abdominal:      Tenderness: There is no abdominal tenderness. There is no guarding. Comments: obese   Musculoskeletal:         General: Normal range of motion.       Cervical back: Normal range of motion. No rigidity. Right lower leg: No edema. Left lower leg: No edema. Skin:     General: Skin is warm and dry. Capillary Refill: Capillary refill takes less than 2 seconds. Coloration: Skin is not jaundiced. Neurological:      Mental Status: She is disoriented. Cranial Nerves: No cranial nerve deficit. Motor: Weakness present. Comments: LUE weakness, sensation and reflexes intact   Psychiatric:         Mood and Affect: Mood normal.        Visit Vitals  /64   Pulse 62   Temp 99.3 °F (37.4 °C)   Resp 14   Ht 5' (1.524 m)   Wt 85.5 kg (188 lb 7.9 oz)   SpO2 95%   BMI 36.81 kg/m²    O2 Flow Rate (L/min): 4 l/min O2 Device: Nasal cannula, Humidifier Temp (24hrs), Av.2 °F (37.3 °C), Min:98.1 °F (36.7 °C), Max:99.8 °F (37.7 °C)    CVP (mmHg): 22 mmHg (23 1000)      Intake/Output:     Intake/Output Summary (Last 24 hours) at 2023 1056  Last data filed at 2023 1000  Gross per 24 hour   Intake 1573.75 ml   Output 2394 ml   Net -820.25 ml         Imaging    23    OR ECHO SHIVANI INTRAOP  2023    Narrative  See Anesthesia Procedure note for procedure details. Pertinent imaging reviewed and interpreted as noted above      CRITICAL CARE DOCUMENTATION  I had a face to face encounter with the patient, reviewed and interpreted patient data including clinical events, labs, images, vital signs, I/O's, and examined patient. I have discussed the case and the plan and management of the patient's care with the consulting services, the bedside nurses and the respiratory therapist.      NOTE OF PERSONAL INVOLVEMENT IN CARE   This patient has a high probability of imminent, clinically significant deterioration, which requires the highest level of preparedness to intervene urgently.  I participated in the decision-making and personally managed or directed the management of the following life and organ supporting interventions that required my frequent assessment to treat or prevent imminent deterioration. I personally spent 35 minutes of critical care time. This is time spent at this critically ill patient's bedside actively involved in patient care as well as the coordination of care. This does not include any procedural time which has been billed separately. Nathan Sam MD  Staff [facility]

## 2023-02-09 NOTE — PROCEDURES
CRITICAL CARE PROCEDURE NOTE      Procedure Note - Arterial Access:   Performed by Juan Ruff NP  Diagnosis: AVR repair  Insertion Date: 02/08/23   Time: 10:25 PM   Obtained Consent? yes; informed   Procedure Location:  CVICU. Immediately prior to the procedure, the patient was reevaluated and found suitable for the planned procedure and any planned medications. Immediately prior to the procedure a time out was called to verify the correct patient, procedure, equipment, staff, and marking as appropriate. Collateral circulation confirmed with Maged Dobbins test.     Line Bundle:  Full sterile barrier precautions used. 5 mL 1% Lidocaine placed at insertion site. Method: Seldinger technique. Site Prep: ChloraPrep and Sterile draping. Procedure: Arterial Catheter Insertion in Left, Radial Artery   Catheter inserted into a new site. Number of Attempts: x2 Indication: Monitoring and Blood Drawing. There was bright red, pulsatile blood return. Femoral Site? no. If Yes, reason femoral site was chosen: n/a  Catheter secured. CHG occlusive dressing in place? yes. Complication None. The procedure was tolerated well. Procedure was directly assisted and supervised by Salvador Vogel in its entirety at bedside.      Vasquez Garcia ,CAITIE   [employer]

## 2023-02-09 NOTE — PROGRESS NOTES
Problem: Mobility Impaired (Adult and Pediatric)  Goal: *Acute Goals and Plan of Care (Insert Text)  Description: FUNCTIONAL STATUS PRIOR TO ADMISSION: Not able to obtain PLOF information, patient oriented x 1 and demonstrates perseveration during conversation. Will update as information becomes available. HOME SUPPORT PRIOR TO ADMISSION: Not able to obtain PLOF information, patient oriented x 1 and demonstrates perseveration during conversation. Will update as information becomes available. Physical Therapy Goals  Initiated 2/9/2023  1. Patient will move from supine to sit and sit to supine  in bed with maximal assistance within 5 days. 2.  Patient will perform sit to/from stand with maximal assistance within 5 days. 3.  Patient will ambulate 5 feet with least restrictive assistive device and maximal assistance within 5 days. 4.  Patient will ascend/descend 5 stairs with one handrail(s) with maximal assistance within 5 days. 5.  Patient will perform cardiac exercises per protocol with minimal assistance/contact guard assist within 5 days. 6.  Patient will verbally recall and functionally demonstrate mindful-based movements (\"move in the tube\") principles without cues within 5 days. Outcome: Progressing Towards Goal   PHYSICAL THERAPY EVALUATION  Patient: Desi Veliz (22 y.o. female)  Date: 2/9/2023  Primary Diagnosis: Aortic stenosis, severe [I35.0]  Procedure(s) (LRB):  RIGHT FEMORAL CUTDOWN; RE-DO STERNOTOMY;  AORTIC VALVE REPLACEMENT (AVR) WITH 21MM INSPIRIS BIOPROSTHETIC VALVE; ASCENDING AORTIC ANEURYSM REPAIR WITH 28MM HEMASHIELD TUBE GRAFT; ECC; SHIVANI & EPIAORTIC US BY DR. ERNANDEZ [facility] (N/A) 2 Days Post-Op   Precautions:   Fall, Sternal      ASSESSMENT  Based on the objective data described below, the patient presents with orientation x 1, perseveration during conversation ?  aphasia, need for incresaed physical assistance, impaired balance, L UE inattention, impaired strength L UE, impaired sensation L UE, and high risk for falls. Patient is POD # 2 s/p re-do sternotomy, re-do AVR, and ascending aortic aneurysm repair. Patient round supine in bed. Patient repeatedly states \"ow\" and \"thank you\", but is intermittently able to respond to simple questions appropriately with one word answers. Patient demonstrates overall decreased command following, especially with commands for L UE, but with max verbal and tactile cues is able to follow some commands. Patient required total A x 2 for rolling L/R, demonstrates improved ability to participate with roll to R. Patient's strength and sensation to pain appears intact bilateral LEs, and command following intact for bilateral LEs across all muscle groups and joints. Patient left with bed in chair position and all needs in reach. All VSS on NC at 6LPM throughout session. Patient is not verbalizing and is not demonstrating understanding of mindful-based movements (\"move in the tube\") principles of keeping UEs proximal to ribcage to prevent lateral pull on the sternum during load-bearing activities with visual, verbal, manual, and tactile cues required for compliance. Current Level of Function Impacting Discharge (mobility/balance): total A x 2 rolling L/R - does participate but due to impaired command following and weakness, not able to contribute >25% effort     Functional Outcome Measure: The patient scored 0/28 on the tinetti outcome measure which is indicative of high risk for falls. Other factors to consider for discharge: perseverative during conversation, impaired sensation and strength L UE, L UE inattention, impaired command processsing/ following      Patient will benefit from skilled therapy intervention to address the above noted impairments.        PLAN :  Recommendations and Planned Interventions: bed mobility training, transfer training, gait training, therapeutic exercises, neuromuscular re-education, patient and family training/education, and therapeutic activities      Frequency/Duration: Patient will be followed by physical therapy:  daily to address goals. Recommendation for discharge: (in order for the patient to meet his/her long term goals)  Therapy 3 hours per day 5-7 days per week    This discharge recommendation:  A follow-up discussion with the attending provider and/or case management is planned    IF patient discharges home will need the following DME: to be determined (TBD)         SUBJECTIVE:   Patient stated Ow.   \"Thank you. \"    OBJECTIVE DATA SUMMARY:   Patient mobilized on continuous portable monitor/telemetry.   HISTORY:    Past Medical History:   Diagnosis Date    Adverse effect of anesthesia     MORPHINE - DIFFICULTY WAKING    Aneurysm ([facility] [address] 02/2023    AORTIC ANEURYSM    Aortic stenosis 05/04/2010    Arthritis     Asthma 05/04/2010    Congestive heart failure ([facility] [address]     heart disease    Elevated cholesterol 05/04/2010    elevated particle number    Ex-smoker 05/04/2010    Family history of diabetes mellitus (DM) 05/04/2010    Family history of early CAD 05/04/2010    GERD (gastroesophageal reflux disease)     Heart murmur     Hypertension     Ill-defined condition     ALLERGIC TO MOLD AND MILDEW    Osteopenia 05/04/2010    Stroke ([facility] [address] 01/31/2023    TIA    Uterine fibroid 05/04/2010    Vitamin D deficiency 05/10/2010     Past Surgical History:   Procedure Laterality Date    ENDOSCOPY, COLON, DIAGNOSTIC  06/01/2004    due 6/14    HX AORTIC VALVE REPLACEMENT  2014    HX BREAST BIOPSY  06/01/2006    BENIGN    HX GI      COLONOSCOPY    HX GYN  10/01/2001    thermal ablation    HX PACEMAKER  07/18/2014    HX ROTATOR CUFF REPAIR Left 2021    FL UNLISTED PROCEDURE BREAST  01/01/1978    clogged milk duct    FL UNLISTED PROCEDURE CARDIAC SURGERY  01/01/2014    CARDIAC CATH       Personal factors and/or comorbidities impacting plan of care: hx AVR     Home Situation  Home Environment:  (patient not able to provide information at evaluation)    EXAMINATION/PRESENTATION/DECISION MAKING:     Critical Behavior:  Neurologic State: Alert, Confused  Orientation Level: Oriented to person, Disoriented to place, Disoriented to situation, Disoriented to time  Cognition: Impaired decision making, Follows commands, Decreased attention/concentration, Poor safety awareness  Safety/Judgement: Decreased awareness of environment, Decreased awareness of need for assistance, Decreased awareness of need for safety, Decreased insight into deficits  Hearing: Auditory  Auditory Impairment: Hard of hearing, bilateral  Skin:  intact  Edema: none noted  Range Of Motion:  AROM: Generally decreased, functional (LUE with > impairment distally)                       Strength:    Strength: Generally decreased, functional (LUE with > impairment distally)                    Tone & Sensation:   Tone: Normal              Sensation: Impaired               Coordination:  Coordination: Generally decreased, functional  Vision:   Tracking: Unable to test secondary due to decreased visual attention  Functional Mobility:  Bed Mobility:  Rolling: Total assistance;Assist x2  Supine to Sit: Total assistance (bed mechanics used)  Sit to Supine: Total assistance (bed mechanics used)  Scooting: Total assistance;Assist x2 (to [facility])  Transfers:                             Balance:   Sitting: Impaired  Sitting - Static: Poor (constant support) (supported sitting, bed in chair position)          Cardiac diagnosis intervention:  Patient instructed and educated on mindful movement principles based on Move in The Tube concept to include maintaining bilateral elbows close to rib cage when performing any load-bearing activity such as getting in/out of bed, pushing up from a chair, opening a door, or lifting a box. Patient was given a handout with diagrams of each correct/incorrect method of performing each of the above tasks.     Functional Measure:  Tinetti test:    Sitting Balance: 0  Arises: 0  Attempts to Rise: 0  Immediate Standing Balance: 0  Standing Balance: 0  Nudged: 0  Eyes Closed: 0  Turn 360 Degrees - Continuous/Discontinuous: 0  Turn 360 Degrees - Steady/Unsteady: 0  Sitting Down: 0  Balance Score: 0 Balance total score  Indication of Gait: 0  R Step Length/Height: 0  L Step Length/Height: 0  R Foot Clearance: 0  L Foot Clearance: 0  Step Symmetry: 0  Step Continuity: 0  Path: 0  Trunk: 0  Walking Time: 0  Gait Score: 0 Gait total score  Total Score: 0/28 Overall total score         Tinetti Tool Score Risk of Falls  <19 = High Fall Risk  19-24 = Moderate Fall Risk  25-28 = Low Fall Risk  Tinetti ME. Performance-Oriented Assessment of Mobility Problems in Elderly Patients. [facility] 66; [id number]. (Scoring Description: PT Bulletin Feb. 10, 1993)    Older adults: Applealma Downey et al, 2009; n = 1000 [facility] elderly evaluated with ABC, LESLI, ADL, and IADL)  · Mean LESLI score for males aged 69-68 years = 26.21(3.40)  · Mean LESLI score for females age 69-68 years = 25.16(4.30)  · Mean LESLI score for males over 80 years = 23.29(6.02)  · Mean LESLI score for females over 80 years = 17.20(8.32)            Physical Therapy Evaluation Charge Determination   History Examination Presentation Decision-Making   MEDIUM  Complexity : 1-2 comorbidities / personal factors will impact the outcome/ POC  MEDIUM Complexity : 3 Standardized tests and measures addressing body structure, function, activity limitation and / or participation in recreation  HIGH Complexity : Unstable and unpredictable characteristics  Other outcome measures tinetti  HIGH       Based on the above components, the patient evaluation is determined to be of the following complexity level: MEDIUM    Pain Rating:  Patient states \"ow\" but not able to detail where pain is located.      Activity Tolerance:   Good and desaturates with exertion and requires oxygen    After treatment patient left in no apparent distress:   Call bell within reach, Side rails x 3, and bed in chair position     COMMUNICATION/EDUCATION:   The patients plan of care was discussed with: Occupational therapist, Registered nurse, and Physician. Patient is unable to participate in goal setting and plan of care.     Thank you for this referral.  Orlinda Schirmer, PT   Time Calculation: 19 mins

## 2023-02-10 ENCOUNTER — APPOINTMENT (OUTPATIENT)
Dept: GENERAL RADIOLOGY | Age: 73
DRG: 219 | End: 2023-02-10
Attending: NURSE PRACTITIONER
Payer: COMMERCIAL

## 2023-02-10 ENCOUNTER — APPOINTMENT (OUTPATIENT)
Dept: CT IMAGING | Age: 73
DRG: 219 | End: 2023-02-10
Payer: COMMERCIAL

## 2023-02-10 ENCOUNTER — APPOINTMENT (OUTPATIENT)
Dept: GENERAL RADIOLOGY | Age: 73
DRG: 219 | End: 2023-02-10
Payer: COMMERCIAL

## 2023-02-10 LAB
ALBUMIN SERPL-MCNC: 3.1 G/DL (ref 3.5–5)
ALBUMIN/GLOB SERPL: 1.3 (ref 1.1–2.2)
ALP SERPL-CCNC: 41 U/L (ref 45–117)
ALT SERPL-CCNC: 16 U/L (ref 12–78)
ANION GAP SERPL CALC-SCNC: 4 MMOL/L (ref 5–15)
AST SERPL-CCNC: 26 U/L (ref 15–37)
BDY SITE: ABNORMAL
BILIRUB SERPL-MCNC: 0.6 MG/DL (ref 0.2–1)
BUN SERPL-MCNC: 24 MG/DL (ref 6–20)
BUN/CREAT SERPL: 62 (ref 12–20)
CALCIUM SERPL-MCNC: 8.4 MG/DL (ref 8.5–10.1)
CHLORIDE SERPL-SCNC: 108 MMOL/L (ref 97–108)
CO2 SERPL-SCNC: 27 MMOL/L (ref 21–32)
COHGB MFR BLD: 1 % (ref 1–2)
CREAT SERPL-MCNC: 0.39 MG/DL (ref 0.55–1.02)
ERYTHROCYTE [DISTWIDTH] IN BLOOD BY AUTOMATED COUNT: 14.9 % (ref 11.5–14.5)
GLOBULIN SER CALC-MCNC: 2.3 G/DL (ref 2–4)
GLUCOSE BLD STRIP.AUTO-MCNC: 120 MG/DL (ref 65–117)
GLUCOSE BLD STRIP.AUTO-MCNC: 128 MG/DL (ref 65–117)
GLUCOSE BLD STRIP.AUTO-MCNC: 136 MG/DL (ref 65–117)
GLUCOSE BLD STRIP.AUTO-MCNC: 140 MG/DL (ref 65–117)
GLUCOSE SERPL-MCNC: 151 MG/DL (ref 65–100)
HCT VFR BLD AUTO: 30.7 % (ref 35–47)
HGB BLD-MCNC: 9.9 G/DL (ref 11.5–16)
MAGNESIUM SERPL-MCNC: 2.3 MG/DL (ref 1.6–2.4)
MCH RBC QN AUTO: 29.3 PG (ref 26–34)
MCHC RBC AUTO-ENTMCNC: 32.2 G/DL (ref 30–36.5)
MCV RBC AUTO: 90.8 FL (ref 80–99)
METHGB MFR BLD: 0.3 % (ref 0–1.4)
NRBC # BLD: 0 K/UL (ref 0–0.01)
NRBC BLD-RTO: 0 PER 100 WBC
OXYHGB MFR BLD: 59.9 % (ref 94–97)
PLATELET # BLD AUTO: 49 K/UL (ref 150–400)
POTASSIUM SERPL-SCNC: 4.5 MMOL/L (ref 3.5–5.1)
PROT SERPL-MCNC: 5.4 G/DL (ref 6.4–8.2)
RBC # BLD AUTO: 3.38 M/UL (ref 3.8–5.2)
SAO2 % BLD: 61 % (ref 95–99)
SERVICE CMNT-IMP: ABNORMAL
SODIUM SERPL-SCNC: 139 MMOL/L (ref 136–145)
SPECIMEN SITE: ABNORMAL
SRA .2 IU/ML UFH SER-ACNC: 3 % (ref 0–20)
SRA 100IU/ML UFH SER-ACNC: <1 % (ref 0–20)
SRA UFH SER-IMP: NORMAL
WBC # BLD AUTO: 15.7 K/UL (ref 3.6–11)

## 2023-02-10 PROCEDURE — 71045 X-RAY EXAM CHEST 1 VIEW: CPT

## 2023-02-10 PROCEDURE — 74011636637 HC RX REV CODE- 636/637: Performed by: NURSE PRACTITIONER

## 2023-02-10 PROCEDURE — 94660 CPAP INITIATION&MGMT: CPT

## 2023-02-10 PROCEDURE — C9113 INJ PANTOPRAZOLE SODIUM, VIA: HCPCS | Performed by: NURSE PRACTITIONER

## 2023-02-10 PROCEDURE — 65660000001 HC RM ICU INTERMED STEPDOWN

## 2023-02-10 PROCEDURE — 70450 CT HEAD/BRAIN W/O DYE: CPT

## 2023-02-10 PROCEDURE — 80053 COMPREHEN METABOLIC PANEL: CPT

## 2023-02-10 PROCEDURE — 77030038269 HC DRN EXT URIN PURWCK BARD -A

## 2023-02-10 PROCEDURE — 97530 THERAPEUTIC ACTIVITIES: CPT

## 2023-02-10 PROCEDURE — 74011000250 HC RX REV CODE- 250

## 2023-02-10 PROCEDURE — 36415 COLL VENOUS BLD VENIPUNCTURE: CPT

## 2023-02-10 PROCEDURE — 85027 COMPLETE CBC AUTOMATED: CPT

## 2023-02-10 PROCEDURE — 82375 ASSAY CARBOXYHB QUANT: CPT

## 2023-02-10 PROCEDURE — 74011250636 HC RX REV CODE- 250/636: Performed by: NURSE PRACTITIONER

## 2023-02-10 PROCEDURE — 74011250637 HC RX REV CODE- 250/637: Performed by: NURSE PRACTITIONER

## 2023-02-10 PROCEDURE — 74011250637 HC RX REV CODE- 250/637

## 2023-02-10 PROCEDURE — P9047 ALBUMIN (HUMAN), 25%, 50ML: HCPCS | Performed by: NURSE PRACTITIONER

## 2023-02-10 PROCEDURE — 97535 SELF CARE MNGMENT TRAINING: CPT

## 2023-02-10 PROCEDURE — 74011000250 HC RX REV CODE- 250: Performed by: NURSE PRACTITIONER

## 2023-02-10 PROCEDURE — 83735 ASSAY OF MAGNESIUM: CPT

## 2023-02-10 PROCEDURE — 82962 GLUCOSE BLOOD TEST: CPT

## 2023-02-10 PROCEDURE — 92526 ORAL FUNCTION THERAPY: CPT

## 2023-02-10 RX ORDER — FACIAL-BODY WIPES
10 EACH TOPICAL DAILY
Status: DISCONTINUED | OUTPATIENT
Start: 2023-02-11 | End: 2023-02-13

## 2023-02-10 RX ORDER — AMLODIPINE BESYLATE 5 MG/1
5 TABLET ORAL DAILY
Status: DISCONTINUED | OUTPATIENT
Start: 2023-02-10 | End: 2023-02-10

## 2023-02-10 RX ORDER — PANTOPRAZOLE SODIUM 40 MG/1
40 TABLET, DELAYED RELEASE ORAL
Status: DISCONTINUED | OUTPATIENT
Start: 2023-02-11 | End: 2023-02-15 | Stop reason: HOSPADM

## 2023-02-10 RX ORDER — SODIUM CHLORIDE 0.9 % (FLUSH) 0.9 %
5-40 SYRINGE (ML) INJECTION AS NEEDED
Status: DISCONTINUED | OUTPATIENT
Start: 2023-02-10 | End: 2023-02-15 | Stop reason: HOSPADM

## 2023-02-10 RX ORDER — CARVEDILOL 6.25 MG/1
6.25 TABLET ORAL 2 TIMES DAILY WITH MEALS
Status: DISCONTINUED | OUTPATIENT
Start: 2023-02-11 | End: 2023-02-15 | Stop reason: HOSPADM

## 2023-02-10 RX ORDER — SODIUM CHLORIDE 0.9 % (FLUSH) 0.9 %
5-40 SYRINGE (ML) INJECTION EVERY 8 HOURS
Status: DISCONTINUED | OUTPATIENT
Start: 2023-02-10 | End: 2023-02-15 | Stop reason: HOSPADM

## 2023-02-10 RX ORDER — SODIUM CHLORIDE 450 MG/100ML
10 INJECTION, SOLUTION INTRAVENOUS CONTINUOUS
Status: DISCONTINUED | OUTPATIENT
Start: 2023-02-10 | End: 2023-02-11

## 2023-02-10 RX ADMIN — ALBUMIN (HUMAN) 12.5 G: 0.25 INJECTION, SOLUTION INTRAVENOUS at 12:01

## 2023-02-10 RX ADMIN — MAGNESIUM OXIDE 400 MG (241.3 MG MAGNESIUM) TABLET 400 MG: TABLET at 09:03

## 2023-02-10 RX ADMIN — FUROSEMIDE 40 MG: 10 INJECTION, SOLUTION INTRAMUSCULAR; INTRAVENOUS at 17:42

## 2023-02-10 RX ADMIN — Medication 2 UNITS: at 12:01

## 2023-02-10 RX ADMIN — TRAMADOL HYDROCHLORIDE 50 MG: 50 TABLET, COATED ORAL at 12:03

## 2023-02-10 RX ADMIN — SODIUM CHLORIDE 9 ML/HR: 9 INJECTION, SOLUTION INTRAVENOUS at 08:03

## 2023-02-10 RX ADMIN — MUPIROCIN: 20 OINTMENT TOPICAL at 17:43

## 2023-02-10 RX ADMIN — POLYETHYLENE GLYCOL 3350 17 G: 17 POWDER, FOR SOLUTION ORAL at 09:03

## 2023-02-10 RX ADMIN — SENNOSIDES AND DOCUSATE SODIUM 1 TABLET: 50; 8.6 TABLET ORAL at 09:03

## 2023-02-10 RX ADMIN — SODIUM CHLORIDE, PRESERVATIVE FREE 10 ML: 5 INJECTION INTRAVENOUS at 14:00

## 2023-02-10 RX ADMIN — SODIUM CHLORIDE, PRESERVATIVE FREE 10 ML: 5 INJECTION INTRAVENOUS at 07:08

## 2023-02-10 RX ADMIN — AMIODARONE HYDROCHLORIDE 400 MG: 200 TABLET ORAL at 17:42

## 2023-02-10 RX ADMIN — MUPIROCIN: 20 OINTMENT TOPICAL at 09:05

## 2023-02-10 RX ADMIN — ATORVASTATIN CALCIUM 40 MG: 40 TABLET, FILM COATED ORAL at 21:01

## 2023-02-10 RX ADMIN — CHLORHEXIDINE GLUCONATE 10 ML: 1.2 RINSE ORAL at 17:43

## 2023-02-10 RX ADMIN — SODIUM CHLORIDE 40 MG: 9 INJECTION, SOLUTION INTRAMUSCULAR; INTRAVENOUS; SUBCUTANEOUS at 09:02

## 2023-02-10 RX ADMIN — SODIUM CHLORIDE, PRESERVATIVE FREE 10 ML: 5 INJECTION INTRAVENOUS at 21:01

## 2023-02-10 RX ADMIN — MAGNESIUM OXIDE 400 MG (241.3 MG MAGNESIUM) TABLET 400 MG: TABLET at 17:42

## 2023-02-10 RX ADMIN — ALBUMIN (HUMAN) 12.5 G: 0.25 INJECTION, SOLUTION INTRAVENOUS at 07:08

## 2023-02-10 RX ADMIN — MONTELUKAST 10 MG: 10 TABLET, FILM COATED ORAL at 21:01

## 2023-02-10 RX ADMIN — FUROSEMIDE 40 MG: 10 INJECTION, SOLUTION INTRAMUSCULAR; INTRAVENOUS at 09:03

## 2023-02-10 RX ADMIN — AMLODIPINE BESYLATE 5 MG: 5 TABLET ORAL at 09:03

## 2023-02-10 RX ADMIN — OXYBUTYNIN CHLORIDE 10 MG: 5 TABLET, EXTENDED RELEASE ORAL at 21:01

## 2023-02-10 RX ADMIN — CHLORHEXIDINE GLUCONATE 10 ML: 1.2 RINSE ORAL at 09:05

## 2023-02-10 RX ADMIN — SODIUM CHLORIDE, PRESERVATIVE FREE 10 ML: 5 INJECTION INTRAVENOUS at 17:43

## 2023-02-10 RX ADMIN — ALBUMIN (HUMAN) 12.5 G: 0.25 INJECTION, SOLUTION INTRAVENOUS at 00:15

## 2023-02-10 RX ADMIN — ACETAMINOPHEN 1000 MG: 500 TABLET, FILM COATED ORAL at 21:01

## 2023-02-10 NOTE — PROGRESS NOTES
Spiritual Care Assessment/Progress Note  [facility]      NAME: Eddie Altman      MRN: [id number]  AGE: 72 y.o.  SEX: female  Samaritan Affiliation: Cheondoism   Language: English     2/10/2023     Total Time (in minutes): 8     Spiritual Assessment begun in [facility] 4 CV INTNS[facility] CARE through conversation with:         [x]Patient        [] Family    [] Friend(s)        Reason for Consult: Initial/Spiritual assessment, critical care     Spiritual beliefs: (Please include comment if needed)     [x] Identifies with a mar tradition:         [] Supported by a mar community:            [] Claims no spiritual orientation:           [] Seeking spiritual identity:                [] Adheres to an individual form of spirituality:           [] Not able to assess:                           Identified resources for coping:      [x] Prayer                               [] Music                  [] Guided Imagery     [] Family/friends                 [] Pet visits     [] Devotional reading                         [] Unknown     [] Other:                                               Interventions offered during this visit: (See comments for more details)    Patient Interventions: Affirmation of emotions/emotional suffering, Catharsis/review of pertinent events in supportive environment, Initial/Spiritual assessment, Critical care, Prayer (actual), Prayer (assurance of)           Plan of Care:     [x] Support spiritual and/or cultural needs    [] Support AMD and/or advance care planning process      [] Support grieving process   [] Coordinate Rites and/or Rituals    [] Coordination with community clergy   [] No spiritual needs identified at this time   [] Detailed Plan of Care below (See Comments)  [] Make referral to Music Therapy  [] Make referral to Pet Therapy     [] Make referral to Addiction services  [] Make referral to [facility]  [] Make referral to Spiritual Care Partner  [] No future visits requested [x] Contact Spiritual Care for further referrals     Comments: Visited Ms Efe Camp in [address] [facility] for initial spiritual assessment; reviewed patient's chart prior to visit. Ms Efe Camp was lying quietly in bed; she appeared drowsy but in pretty good spirits. Provided spiritual presence and active listening as patient shared that her nurse had told her that she was doing okay, so she guess she was. She stated that her primary concern was wanting to quickly recover so she could go home and play with her seven grandchildren. Acknowledged her feelings and offered words of support. Patient requested that  have prayer for healing on her behalf; had prayer as requested. Assured her of ongoing  availability for support. : Rev. Antoni Vega.  Roni Vera; [facility], to contact [phone] call: [phone]

## 2023-02-10 NOTE — PROGRESS NOTES
Attempted Endurance extended dwell IV insertion x 2 but was unsuccessful. Other vascular access nurse, Mike Jones RN, to attempt.   YESENIA HusseinN, RN, [facility]-BC   Vascular Access Team

## 2023-02-10 NOTE — PROGRESS NOTES
SPEECH LANGUAGE PATHOLOGY DYSPHAGIA TREATMENT  Patient: Juliette Hannon (24 y.o. female)  Date: 2/10/2023  Diagnosis: Aortic stenosis, severe [I35.0] <principal problem not specified>  Procedure(s) (LRB):  RIGHT FEMORAL CUTDOWN; RE-DO STERNOTOMY;  AORTIC VALVE REPLACEMENT (AVR) WITH 21MM INSPIRIS BIOPROSTHETIC VALVE; ASCENDING AORTIC ANEURYSM REPAIR WITH 28MM HEMASHIELD TUBE GRAFT; ECC; SHIVANI & EPIAORTIC US BY DR. ERNANDEZ [facility] (N/A) 3 Days Post-Op  Precautions: Aspiration, Fall, Sternal    ASSESSMENT:  Patient seen for dysphagia follow-up session. Swallow assessed with thin liquids and solids administered by SLP + whole medications with liquids administered by RN. Adequate bolus acceptance and containment observed. Mildly slowed mastication noted. Patient with intermittent strong cough prior to PO trials. Appreciated occasional strong cough following PO trials of all consistencies; however, this finding was not consistent and unclear if truly associated with a pharyngeal swallow impairment as it occurred prior to swallowing. Vocal quality remained clear to phonation post-swallow and patient denied sensation of altered swallowing. Patient will benefit from close monitoring of PO diet tolerance based on this clinical presentation. RN reports team would like to advance PO diet to promote nutrition. Recommend PO diet advancement to soft/bite-sized with thin liquids in conjunction with strict aspiration precautions and close monitoring of tolerance. SLP will follow closely. PLAN:  Recommendations and Planned Interventions:  Advance PO diet to soft/bite-sized with thin liquids  Oral medications as tolerated  Oral care via standard toothbrush 2-3x/daily  Aspiration precautions: Upright positioning, slow rate, small bites/sips, monitor tolerance  SLP will follow for dysphagia management    Patient continues to benefit from skilled intervention to address the above impairments.   Continue treatment per established plan of care.  Discharge Recommendations: To Be Determined     SUBJECTIVE:   Patient stated I remember you; you're bubbly.     OBJECTIVE:   Cognitive and Communication Status:  Neurologic State: Alert, Eyes open to voice  Orientation Level: Oriented X4  Cognition: Decreased attention/concentration, Follows commands    Dysphagia Treatment:  Oral Assessment:  P.O. Trials:  Patient Position: Upright in bed  Vocal quality prior to P.O.: Mildly strained, unchanged from baseline per patient report  Consistency Presented: Thin liquid; Solid;Puree;Pill/Tablet  How Presented: SLP-fed/presented;Spoon;Straw;Successive swallows  Bolus Acceptance: No impairment  Bolus Formation/Control: Impaired  Type of Impairment: Delayed;Mastication  Propulsion: No impairment  Oral Residue: None  Aspiration Signs/Symptoms: Strong cough (with and without PO trials)    After treatment:   Patient left in no apparent distress in bed, Call bell within reach, and Nursing notified    COMMUNICATION/EDUCATION:   The patient's plan of care including recommendations, planned interventions, and recommended diet changes were discussed with: Registered nurse. DAYAN Macias  Time Calculation: 22 mins             Problem: Dysphagia (Adult)  Goal: *Acute Goals and Plan of Care (Insert Text)  Description: Initiated 2/9/2023  1. Patient will tolerate clears free of sequelae of aspiration within 7 days. Goal met 2/10/23. 2. Patient will participate in further assessment of swallow function within 7 days. Goal met 2/10/23. 3. Patient will tolerate soft/bite-sized diet with thin liquids without overt s/s of aspiration or respiratory decline within 7 days. Goal initiated 2/10/23.    Outcome: Progressing Towards Goal

## 2023-02-10 NOTE — PROGRESS NOTES
Transitions of Care Plan  RUR: 15% - moderate  Clinical Update: s/p AVR; redo sternotomy  Consults: Therapy; SLP; DTC  Baseline: independent without DME; resides w   Barriers to Discharge: medical  Disposition: anticipate acute rehab   Estimated Discharge Date: 2+ days    Reason for Admission:   Cardiac Surgery - AVR; redo sternotomy                  RUR Score: 15% - moderate                 PCP: First and Last name:   Ana Powell MD     Name of Practice:    Are you a current patient: Yes/No:    Approximate date of last visit:    Can you participate in a virtual visit if needed:     Do you (patient/family) have any concerns for transition/discharge? Patient concerned about her deconditioned and weakened state post operatively - discussed w therapy              Plan for utilizing home health: At this time patient is being recommended for acute rehab    Current Advanced Directive/Advance Care Plan:  Full Code      Healthcare Decision Maker:   Click here to complete [id number] including selection of the Healthcare Decision Maker Relationship (ie \"Primary\")             - Garrick Hamman - p: [phone]    Transition of Care Plan:          Chart review assessment as patient was asleep this afternoon during attempt. Patient independent at baseline and resides w her  at address on file. Supported by children. Patient will likely need acute rehab for discharge - discussed with CT surgery NP who is agreeable. CM will continue to follow. Ryan Hdez, MPH  Care Manager [facility]  Available via [employer]  [email or web address]    Care Management Interventions  PCP Verified by CM: Yes  Palliative Care Criteria Met (RRAT>21 & CHF Dx)?: No  Mode of Transport at Discharge:  Other (see comment)  Transition of Care Consult (CM Consult): Discharge Planning, Acute Rehab  MyChart Signup: Yes  Discharge Durable Medical Equipment: No  Health Maintenance Reviewed: Yes  Physical Therapy Consult: Yes  Occupational Therapy Consult: Yes  Speech Therapy Consult: Yes  Support Systems: Spouse/Significant Other, Child(jocelin)  Confirm Follow Up Transport: Family  Discharge Location  Patient Expects to be Discharged to[de-identified] Rehab hospital/unit acute

## 2023-02-10 NOTE — PROGRESS NOTES
Problem: Mobility Impaired (Adult and Pediatric)  Goal: *Acute Goals and Plan of Care (Insert Text)  Description: FUNCTIONAL STATUS PRIOR TO ADMISSION: Not able to obtain PLOF information, patient oriented x 1 and demonstrates perseveration during conversation. Will update as information becomes available. HOME SUPPORT PRIOR TO ADMISSION: Not able to obtain PLOF information, patient oriented x 1 and demonstrates perseveration during conversation. Will update as information becomes available. Physical Therapy Goals  Initiated 2/9/2023  1. Patient will move from supine to sit and sit to supine  in bed with maximal assistance within 5 days. 2.  Patient will perform sit to/from stand with maximal assistance within 5 days. 3.  Patient will ambulate 5 feet with least restrictive assistive device and maximal assistance within 5 days. 4.  Patient will ascend/descend 5 stairs with one handrail(s) with maximal assistance within 5 days. 5.  Patient will perform cardiac exercises per protocol with minimal assistance/contact guard assist within 5 days. 6.  Patient will verbally recall and functionally demonstrate mindful-based movements (\"move in the tube\") principles without cues within 5 days. Outcome: Progressing Towards Goal   PHYSICAL THERAPY TREATMENT  Patient: Ata Cooper (37 y.o. female)  Date: 2/10/2023  Diagnosis: Aortic stenosis, severe [I35.0] <principal problem not specified>  Procedure(s) (LRB):  RIGHT FEMORAL CUTDOWN; RE-DO STERNOTOMY;  AORTIC VALVE REPLACEMENT (AVR) WITH 21MM INSPIRIS BIOPROSTHETIC VALVE; ASCENDING AORTIC ANEURYSM REPAIR WITH 28MM HEMASHIELD TUBE GRAFT; ECC; SHIVANI & EPIAORTIC US BY DR. ERNANDEZ [facility] (N/A) 3 Days Post-Op  Precautions: Fall, Sternal  Chart, physical therapy assessment, plan of care and goals were reviewed. ASSESSMENT  Patient continues with skilled PT services and is progressing towards goals. Patient found supine in bed and agreeable to PT.  Spo2 stabe on 2LPM at rest and with activity. Patient continues to demonstrate apparent L inattention, impaired coordination with L UE, and impaired strength L UE. Mild L LE strength deficit though functional (R knee extension 5/5, L knee extension 4/5). Patient oriented x 4 today and able to provide home set up/PLOF information. Patient was independent without AD at baseline. Patient required max A x 1-2 for rolling and supine > sit. Patient demonstrated poor progressing to fair sitting balance at EOB, demonstrating R lateral lean initially, later demonstrating L posterior lean requiring intermittent support to prevent LOB. Patient completed sit <> stand with mod A x 2, and took a few steps bed > chair with mod-max A x 2, requiring increased assistance on L side due to L sided weakness/  L lateral lean. Patient left seated in chair with all needs in reach at end of session. Patient is well below independent and active PLOF and will require IPR at discharge. Patient is not verbalizing and is not demonstrating understanding of mindful-based movements (\"move in the tube\") principles of keeping UEs proximal to ribcage to prevent lateral pull on the sternum during load-bearing activities with visual, verbal, and tactile cues required for compliance. Current Level of Function Impacting Discharge (mobility/balance): bed > chair with mod-max A x 2     Other factors to consider for discharge: continued L inattention, L sided weakness UE>LE, impaired coordination L UE, assist x 2, orientation x 4 today         PLAN :  Patient continues to benefit from skilled intervention to address the above impairments. Continue treatment per established plan of care. to address goals.     Recommendation for discharge: (in order for the patient to meet his/her long term goals)  Therapy 3 hours per day 5-7 days per week    This discharge recommendation:  A follow-up discussion with the attending provider and/or case management is planned    IF patient discharges home will need the following DME: to be determined (TBD)       SUBJECTIVE:   Patient stated This is going to be a problem.  referring to poor L UE coordination and L UE weakness    OBJECTIVE DATA SUMMARY:   Patient mobilized on continuous portable monitor/telemetry. Critical Behavior:  Neurologic State: Alert  Orientation Level: Oriented X4  Cognition: Follows commands  Safety/Judgement: Fall prevention, Home safety    Functional Mobility Training:  Bed Mobility:  Rolling: Maximum assistance;Assist x1  Supine to Sit: Maximum assistance;Assist x2     Scooting: Maximum assistance;Assist x2          Transfers:  Sit to Stand: Moderate assistance;Assist x2  Stand to Sit: Moderate assistance;Assist x2        Bed to Chair: Moderate assistance;Maximum assistance;Assist x2 (mod on R, max on L due to L lateral lean)                      Balance:  Sitting: Impaired; With support  Sitting - Static: Fair (occasional)  Sitting - Dynamic: Poor (constant support)  Standing: Impaired; With support  Standing - Static: Fair;Constant support  Standing - Dynamic : Fair;Constant support         Cardiac diagnosis intervention:  Patient instructed and educated on mindful movement principles based on Move in The Tube concept to include maintaining bilateral elbows close to rib cage when performing any load-bearing activity such as getting in/out of bed, pushing up from a chair, opening a door, or lifting a box. Patient was given a handout with diagrams of each correct/incorrect method of performing each of the above tasks. Pain Ratin/10    Activity Tolerance:   Good and requires rest breaks    After treatment patient left in no apparent distress:   Sitting in chair, Call bell within reach, and Bed / chair alarm activated    COMMUNICATION/COLLABORATION:   The patients plan of care was discussed with: Occupational therapist and Registered nurse.      Aileen Lyn, PT   Time Calculation: 32 mins

## 2023-02-10 NOTE — PROGRESS NOTES
A 20g 6cm extended dwell (Endurance) PIV catheter placed in right forearm. Endurance catheter may be used for blood draws--flush, waste 3ml, and flush with 20ml NS post blood draw. Tourniquet may be used to help with blood draws--place at least 6cm above insertion site.     Tricia Bahena, RN  Vascular Access Team

## 2023-02-10 NOTE — PROGRESS NOTES
Critical Care Medicine Plan of Care    Pt with continued clinical improvement, now off vasopressors/inotropes after PPM rate adjustment. Pending CT head for further evaluation of deficits post surgery. Critical Care will follow peripherally at this time while they remain in CVICU. Please do not hesitate to contact Intensivist service if there is a clinical question, or if pt with acute change in clinical status. Nathan Sorto MD  Staff [address] [facility]

## 2023-02-10 NOTE — PROGRESS NOTES
Problem: Self Care Deficits Care Plan (Adult)  Goal: *Acute Goals and Plan of Care (Insert Text)  Description: FUNCTIONAL STATUS PRIOR TO ADMISSION: Pt unable to provide PLOF at time of evaluation d/t impaired cognition. Per chart review pt is independent and active without use of DME.      HOME SUPPORT: The patient lived with her  Enriqueta Fairbanks) but did not require assist with ADL tasks. Per chart, pt is still working in administration. Occupational Therapy Goals  Initiated 2/9/2023  1. Patient will perform basic grooming routine in supported sitting with minimal assistance within 7 day(s). 2.  Patient will perform anterior neck to thigh bathing with moderate assistance within 7 day(s). 3.  Patient will perform upper body dressing with moderate assistance within 7 day(s). 4.  Patient will perform lateral rolling in prep for completion of toileting routine with moderate assistance within 7 day(s). 5.  Patient will participate in upper extremity therapeutic exercise/activities with supervision and visual/verbal cues for 5 minutes within 7 day(s). Outcome: Progressing Towards Goal   OCCUPATIONAL THERAPY TREATMENT  Patient: Reggie Mueller (49 y.o. female)  Date: 2/10/2023  Diagnosis: Aortic stenosis, severe [I35.0] <principal problem not specified>  Procedure(s) (LRB):  RIGHT FEMORAL CUTDOWN; RE-DO STERNOTOMY;  AORTIC VALVE REPLACEMENT (AVR) WITH 21MM INSPIRIS BIOPROSTHETIC VALVE; ASCENDING AORTIC ANEURYSM REPAIR WITH 28MM HEMASHIELD TUBE GRAFT; ECC; SHIVANI & EPIAORTIC US BY DR. ERNANDEZ [facility] (N/A) 3 Days Post-Op  Precautions: Fall, Sternal  Chart, occupational therapy assessment, plan of care, and goals were reviewed. ASSESSMENT  Patient continues with skilled OT services and is progressing towards goals.   Pt's performance of ADL/IADL tasks continues to be limited at this time by impaired sitting/standing balance (R lateral and posterior lean), LUE deficits (strength, ROM, coordination), L sided inattention, decreased activity tolerance, and post-operative pain. Pt received semi-supine, A&Ox4, and agreeable to participation in therapy session. Pt required max A x2 to complete bed mobility and constant min A/manual cues to facilitate maintenance of midline while seated EOB. Multimodal cues required for use of LUE throughout functional transfers and ADL tasks. Once seated in bedside chair pt completed simple grooming task with mod A, proximal support, and use of LUE as gross motor assist/stabilizer. Pt left seated with all needs met. RN notified of session. Patient is not verbalizing and is not demonstrating understanding of mindful-based movements (\"move in the tube\") principles of keeping UEs proximal to ribcage to prevent lateral pull on the sternum during load-bearing activities with visual and verbal cues required for compliance. Current Level of Function Impacting Discharge (ADLs): max A x2 bed mobility, mod A x2 sit<>stand and transfer to bedside chair in prep for ADLs, max A seated LB dressing, mod A simple grooming in supported sitting     Other factors to consider for discharge: PLOF, new L sided weakness/impaired coordination/inattention         PLAN :  Patient continues to benefit from skilled intervention to address the above impairments. Continue treatment per established plan of care to address goals. Recommendation for discharge: (in order for the patient to meet his/her long term goals)  Therapy 3 hours per day 5-7 days per week    This discharge recommendation:  Has been made in collaboration with the attending provider and/or case management    IF patient discharges home will need the following DME: TBD       SUBJECTIVE:   Patient stated My son has a mullet and it's perfect on him!     OBJECTIVE DATA SUMMARY:   Cognitive/Behavioral Status:  Neurologic State: Alert  Orientation Level: Oriented X4  Cognition: Follows commands  Perception: Appears intact  Perseveration: No perseveration noted  Safety/Judgement: Fall prevention;Home safety    Functional Mobility and Transfers for ADLs:  Bed Mobility:  Rolling: Maximum assistance;Assist x1  Supine to Sit: Maximum assistance;Assist x2  Scooting: Maximum assistance;Assist x2    Transfers:  Sit to Stand: Moderate assistance;Assist x2     Bed to Chair: Moderate assistance;Maximum assistance;Assist x2 (mod on R, max on L due to L lateral lean)    Balance:  Sitting: Impaired; With support  Sitting - Static: Fair (occasional)  Sitting - Dynamic: Poor (constant support)  Standing: Impaired; With support  Standing - Static: Fair;Constant support  Standing - Dynamic : Fair;Constant support    ADL Intervention:       Grooming  Grooming Assistance: Moderate assistance  Position Performed: Seated in chair  Brushing Teeth: Moderate assistance;Proximal stability;Training to use affected extremity as a gross motor assistance;Training to use affected extremity as a gross stabilizer  Cues: Verbal cues provided;Visual cues provided;Physical assistance; Tactile cues provided            Lower Body Dressing Assistance  Socks: Maximum assistance  Leg Crossed Method Used: Yes  Position Performed: Long sitting on bed  Cues: Verbal cues provided;Visual cues provided; Tactile cues provided;Physical assistance         Cognitive Retraining  Safety/Judgement: Fall prevention;Home safety    Patient instructed and educated on mindful movement principles based on Move in The Tube concept to include maintaining bilateral elbows close to rib cage when performing any load-bearing activity such as getting in/out of bed, pushing up from a chair, opening a door, or lifting a box. Patient was given a handout with diagrams of each correct/incorrect method of performing each of the above tasks.    Patient instructed on the ability to utilize upper extremities outside the tube when doing any non-load bearing activity such as washing hair/body, brushing teeth, retrieving clothing items, or scratching your back. Patient encouraged to also perform upper extremity exercises \"outside of the tube\" to prevent scar tissue formation around sternal incision site. Pain:  Mild chest pain reported near incision     Activity Tolerance:   Good and Fair    After treatment patient left in no apparent distress:   Sitting in chair and Call bell within reach    COMMUNICATION/COLLABORATION:   The patients plan of care was discussed with: Physical therapist and Registered nurse.      PARIS Echeverria, OTR/L  Time Calculation: 42 mins

## 2023-02-10 NOTE — PROGRESS NOTES
0800 Bedside and Verbal shift change report given to Becka Parra, [address] [facility] (oncoming nurse) by Emelie Duane, SHASHANK (offgoing nurse). Report included the following information SBAR, Kardex, OR Summary, Procedure Summary, Intake/Output, MAR, Recent Results, and Cardiac Rhythm AV paced . Mayuri Malone NP at the bedside. Plan; remove SG, MAC, Art line    0820 SG was removed. 0830 Dr Kady Garcia and Dr Wali Sorto at the bedside. Plan: CT of head, possible to remove chest tubes, kaufman, add PO B/P meds; re-call ST for further evaluation and possible advancements of diet. 0920 ST at the bedside. 0930 Art line was removed without complications. 1040 CT of head w/o contrast.     1150 Family at the bedside. 1345 PT/OT at the bedside. 1400 Pt is up in the chair. 1630 Pt is back in bed. 1730 Kaufman was removed. [address] was placed. 1750 IV access team at the bedside. 2000 Bedside and Verbal shift change report given to Silvana Ferris RN (oncoming nurse) by Becka Parra RN (offgoing nurse). Report included the following information SBAR, Kardex, OR Summary, Procedure Summary, Intake/Output, MAR, Recent Results, and Cardiac Rhythm AV paced .

## 2023-02-10 NOTE — PROGRESS NOTES
Cardiac Surgery Care Coordinator- Met with Eddie Altman and her family,  reviewed plan of care and encouraged them to verbalize. Reinforced sternal precautions and encouraged continued use of the incentive spirometer. Will continue to follow for educational and emotional needs.  Douglas Braswell RN

## 2023-02-10 NOTE — PROGRESS NOTES
[facility] ICU Progress Note    Admit Date: 2023  POD:  3 Day Post-Op    02/10/23    Procedure:  Procedure(s):  RIGHT FEMORAL CUTDOWN; RE-DO STERNOTOMY;  AORTIC VALVE REPLACEMENT (AVR) WITH 21MM INSPIRIS BIOPROSTHETIC VALVE; ASCENDING AORTIC ANEURYSM REPAIR WITH 28MM HEMASHIELD TUBE GRAFT; ECC; SHIVANI & EPIAORTIC US BY DR. ERNANDEZ [facility]       SHIVANI 23 DURING SURGERY  Indications: assessment of ascending aorta and assessment of surgical repair  Modalities: 2D, CF, CWD, contrast, PWD  Probe Type: multiplane  Insertion: atraumatic  Patient Status: intubated    Complications: None  Comments: Pre SHIVANI  SHIVANI views are distorted as the hear is rotated. LV 35-40%, LVH , septal and inf walls down. RV normal.  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 Post SHIVANI  LV fn preserved, ef 50%. RV normal  Bioprosthetic AV in situ, no AI, NO PVL, asc aorta repaired by tube graft. Mean gradient 6 mm across the AV. MR 2+,   LV fn came down to pre op level of ef 35%, same wall motion problems as before. Subjective/24hr events:   Pt seen with Dr. Richardson Caraballo. Pt continues with L arm weakness - Head CT ordered this am. Oriented and following commands. Tmax 99.8, AV-paced @ 80, 98% on 6L NC. Objective:   Pt synopsis:   POD1: Low CI. Increased dobutamine. Weaned off epi later in the day.  POD2: 1 unit platelets. Speech eval. PM increased 80, dobutamine weaned off.  2/10 POD3: Deline. Head CT. Cont to work with speech. Vitals:  Blood pressure (!) 144/69, pulse 80, temperature 99.5 °F (37.5 °C), resp. rate 17, height 5' (1.524 m), weight 181 lb 14.1 oz (82.5 kg), SpO2 98 %.   Temp (24hrs), Av.4 °F (37.4 °C), Min:99 °F (37.2 °C), Max:99.8 °F (37.7 °C)    Hemodynamics:   CO: CO (l/min): 4.6 l/min   CI: CI (l/min/m2): 25 l/min/m2   CVP: CVP (mmHg): 15 mmHg (02/10/23 0800)   SVR: SVR (dyne*sec)/cm5: 1255 (dyne*sec)/cm5 (02/10/23 3415)   PAP Systolic: PAP Systolic: 31 (02/10/23 8977)   PAP Diastolic: PAP Diastolic: 15 (36/91/45 1728)   PVR:     SV02: SVO2 (%): 79 % (02/10/23 0800)   SCV02:      EKG/Rhythm:  AV paced with PPM @ 80    Extubation Date / Time: extubated 2/8/23 1646    CT Output: 230 + 210 = 440 no air leak, on suction, serosanguinous drainage    Oxygen Therapy: 6L NC    CXR: 2/10 on L, 2/9 on R    CXR Results  (Last 48 hours)                 02/10/23 0459  XR CHEST PORT Final result    Impression:  No significant change. Narrative:  INDICATION: post op heart       EXAMINATION:  AP CHEST, PORTABLE       COMPARISON: 2/9/2023       FINDINGS: Single AP portable view of the chest demonstrates no change in   position of the lines and tubes. The cardiomediastinal silhouette is unchanged. Small left pleural effusion and bibasilar atelectasis similar to prior study. No   pneumothorax. 02/09/23 0519  XR CHEST PORT Final result    Impression:  Small left pleural effusion/basilar atelectasis. Post extubation. Narrative:  INDICATION: post op heart       EXAMINATION:  AP CHEST, PORTABLE       COMPARISON: 2/8/2023       FINDINGS: Single AP portable view of the chest demonstrates interval removal of   endotracheal and nasogastric tubes. [place] catheter and pleural/mediastinal   drains remain in place. The cardiomediastinal silhouette is unchanged. No   pneumothorax. Small left pleural effusion and basilar atelectasis, unchanged. No   pulmonary edema or new airspace disease. Admission Weight: Last Weight   Weight: 170 lb 3.1 oz (77.2 kg) Weight: 181 lb 14.1 oz (82.5 kg)     Intake / Output / Drain:  Current Shift: 02/10 0701 - 02/10 1900  In: 346 [P.O.:150;  I.V.:196]  Out: 30 [Urine:20; Drains:10]  Last 24 hrs.:   Intake/Output Summary (Last 24 hours) at 2/10/2023 0947  Last data filed at 2/10/2023 0800  Gross per 24 hour   Intake 996.31 ml   Output 1644 ml   Net -647.69 ml       EXAM:  General: oriented and following commands, lying in bed in distress this am  Lung: CTA apical, diminished bibasilar, poor effort  Incision: dry and intact, no drainage, sternum stable, left groin incision no erythema, hematoma or drainage, ecchymosis present  Heart: s1s2, reg rate and rhy, paced with ppm  Abdomen: hypoactive, soft, non-tender, no bruits. No BM yet. Extremities: warm, well perfused, +2 DP bilaterally, toes with slight mottling, mottling worse on left than right  Neurologic: Follows simple commands, motor/sensory intact to right upper and bilateral lower extremities, decreased strength on L continues but improved from 2/9, minimal resistance to gravity    Labs:   Recent Labs     02/10/23  0709 02/10/23  0509 02/07/23  1558 02/07/23  1553   WBC  --  15.7*   < > 16.4*   HGB  --  9.9*   < > 14.0   HCT  --  30.7*   < > 43.3   PLT  --  49*   < > 80*   NA  --  139   < > 145   K  --  4.5   < > 3.8   BUN  --  24*   < > 10   CREA  --  0.39*   < > 0.48*   GLU  --  151*   < > 130*   GLUCPOC 136*  --    < >  --    INR  --   --   --  1.4*    < > = values in this interval not displayed. Assessment:     Active Problems: Aortic stenosis, severe (2/7/2023)      S/P aortic valve repair (2/7/2023)      Overview: RIGHT FEMORAL CUTDOWN; RIGHT FEMORAL ARTERIAL CANNULATION,  RE-DO       STERNOTOMY;  HYPOTHERMIC ARREST, ANTEGRADE CEREBRAL PERFUSION VIA       INNOMINATE ARTERY, REDO AORTIC VALVE REPLACEMENT (AVR) WITH 21MM INSPIRIS       BIOPROSTHETIC VALVE; ASCENDING AORTIC ANEURYSM REPAIR WITH 28MM HEMASHIELD       TUBE GRAFT; REPAIR AORTIC TEAR, PULMONARY ARTERY REPAIR, RIGHT ATRIAL       REPAIR, ECC; SHIVANI & EPIAORTIC US BY DR. ERNANDEZ [facility]       Plan/Recommendations/Medical Decision Making:     Ascending aortic aneurysm, end of life bioprosthetic aortic valve s/p redo sternotomy, redo aortic valve replacement, repair ascending aortic aneurysm with tube graft, left femoral cutdown and arterial cannulation on 2/7/23. ASA on hold d/t thrombocytopenia.  Cont chest tubes today. SBP goal < 140, had one dose norvasc today but discontinued b/c would rather get pt on coreg which will start 2/11. Cardiogenic shock s/p redo sternotomy, redo AVR. PPM increased to rate of 80. Now off drips. Resolved. Acute respiratory failure with hypoxemia s/p redo sternotomy, redo AVR, on 6L NC, poor effort. Cont to diurese today. - Pulm toilet: acapella, IS, cough, deep breathe, ambulate   Post operative delirium s/p hypothermic arrest, mentation more clear last pm at extubation, multifactorial secondary to pain medication, prolonged bypass run.    - strict day/night schedule  - avoid benzos and narcs  Left arm weakness unknown etiology, improved from yesterday, unsure if motor deficit or secondary to delirium, appears to move all other extremities on command, discussed with rounding team, Concern for TIA prior to surgery: Patient seen in ED 1 week ago, all testing discussed. Per Neurology is safe to proceed with surgery, they favor it possibly being amaurosis fugax. Per Discharge Summary from hospitalist, consider ASA and plavix at discharge. Will repeat Head CT wo contrast today. Mixed cardiac and non-cardiac fluid overload secondary to hemodynamic instability and cardiogenic shock, nephrocheck 1.28, will repeat today to trend down, received 2 doses diuretic yesterday with increased output, then out put reduced again overnight, currently on lasix 40 mg BID. Post operative coagulopathy secondary to prolonged cardiopulmonary bypass, currently stable, received multiple blood products to stabilize. Resolved. Acute blood loss anemia, stable, monitor daily. Above transfusion threshold. Thrombocytopenia, HIT panel sent, results pending, avoid heparin containing products. Monitor CT output. CHFrEF: EF 45% on 2/1/23, Non-preserved EF heart failure with documented EF 35-45% via SHIVANI in surgery.  GDMT as follows:  - Coreg to begin 2/11 am  - Losartan PTA, no ready for this in periop period  - spironolactone outpatient  - may add jardiance closer to discharge  - Lasix 40 mg IV bid  Hx hypertension, PTA losartan 50mg daily - last dose 2/4  Hx hyperlipidemia, PTA atorvastatin 40mg daily  Hx complete heart block r/t post op AVR: [employer] pacemaker implanted in 2014 (with lead revision) patient states has 8 months of battery left on pacemaker. PPM interrogated and functioning properly, rate increased to 80. Asthma: No inhalers noted, hx smoker. PFTs above. GERD: PTA omeprazole 40mg daily. Currently on protonix 40 mg IV will transition to po once swallowing cleared. Post operative debility secondary to open heart surgery: PT/OT, OOB to chair today, may need to use lift. Disposition: Once O2 can be weaned from 6L NC, she can leave the ICU. Deline completely today, PT/OT. Head CT.     Signed By: Miguelina Nunes NP

## 2023-02-10 NOTE — DIABETES MGMT
[address] [facility]  DIABETES MANAGEMENT CONSULT    Consulted by Provider for advanced nursing evaluation and care for inpatient blood glucose management. Evaluation and Action Plan   Ata Cooper is a 71 yo female s/p aortic repair and redo AVR repair for severe AS. Pre -diabetes per A1C 5.9%. Since patient is pre-diabetic anticipate patient will not require additional insulin other than correctional after insulin infusion discontinued. Transitioned off insulin infusion 2/9/23- Fasting BG and subsequent BG in target without evidence of hypo or hyperglycemia. SLP assessed PO intake and noted diet advanced per their recs. PO intake <25% documented. Management Rationale Action Plan     1. Blood glucose monitoring ACHS once off insulin infusion. If glucose within goal, ok to trend on am labs and d/c correctional insulin. Diabetes Management Team to sign off at this point as patient's blood glucose remains stable. Please re-consult us if patient needs change. Thank you for including us in their care. Diabetes Discharge Plan   Medication     Referral  []        Outpatient diabetes education   Additional orders            Initial Presentation   Ata Cooper is a 72 y.o. female admitted 2/7/23 s/p AVR (redo) with aortic repair for severe AS. Per report procedure technically difficult with significant adhesion and friability of tissue with associated significant blood loss. Intra op SHIVANI w/ EF 35%.       HX:   Past Medical History:   Diagnosis Date    Adverse effect of anesthesia     MORPHINE - DIFFICULTY WAKING    Aneurysm ([address] 02/2023    AORTIC ANEURYSM    Aortic stenosis 05/04/2010    Arthritis     Asthma 05/04/2010    Congestive heart failure ([address]     heart disease    Elevated cholesterol 05/04/2010    elevated particle number    Ex-smoker 05/04/2010    Family history of diabetes mellitus (DM) 05/04/2010    Family history of early CAD 05/04/2010    GERD (gastroesophageal reflux disease)     Heart murmur     Hypertension     Ill-defined condition     ALLERGIC TO MOLD AND MILDEW    Osteopenia 05/04/2010    Stroke ([facility] [address] 01/31/2023    TIA    Uterine fibroid 05/04/2010    Vitamin D deficiency 05/10/2010        INITIAL DX:   Aortic stenosis, severe [I35.0]     Current Treatment     TX: post op care/ vasopressor support/ insulin infusion    Hospital Course   Clinical progress has been complicated by post op Acute hypoxemic, hypercapnic respiratory failure/ Acute on chronic HFrEF. Also had intraoperative significant blood loss-4500cc. 2/7/23: admitted/ s./p redo AVR repair with arrest in OR  2/8-2/9: extubated 2/8- LUE weakness noted/ delirium. metabolic encephalopathy/ SLP to assess swallow before diet advanced. Diabetes History   Pre-diabetes, per A1C 5.8%. Diabetes Medication History  Key Antihyperglycemic Medications       Patient is on no antihyperglycemic meds. Reducing risks  [] Influenza: There is no immunization history for the selected administration types on file for this patient. [] Pneumonia:   Immunization History   Administered Date(s) Administered    Pneumococcal Polysaccharide (PPSV-23) 07/20/2014     [] Hepatitis: There is no immunization history for the selected administration types on file for this patient. Subjective   Patient noted to be resting in bed at time of visit. Objective   Physical exam  General Obese female-ill appearing   Neuro  Some delirium  Vital Signs Visit Vitals  /70 (BP Patient Position: Sitting)   Pulse 82   Temp 99.1 °F (37.3 °C)   Resp 21   Ht 5' (1.524 m)   Wt 82.5 kg (181 lb 14.1 oz)   SpO2 93%   BMI 35.52 kg/m²     Skin  Warm and dry. Heart   Regular rate and rhythm.  No murmurs, rubs or gallops  Lungs  Clear to auscultation without rales or rhonchi  Extremities No foot wounds     Laboratory  Recent Labs     02/10/23  0509 02/09/23  0354 02/08/23  1930   * 116* 140*   AGAP 4* 7 6 WBC 15.7* 16.2* 12.5*   CREA 0.39* 0.66 0.60   AST 26 40*  38* 43*   ALT 16 16  15 14         Factors impacting BG management  Factor Dose Comments   Nutrition:  Standard meals-     Carb consistent    Drugs:  Vasopressor cta  Blood transfusion(s)   On/off  Intra operative PRBC transfusion     Affects insulin delivery  A1cs inaccurate-for following 3mo     Pain Post op prn    Infection WBC 11.4    Other:   Kidney function  Liver function     eGFR >60      Blood glucose pattern    Significant diabetes-related events over the past 24-72 hours  Pre diabetes, A1C 5.8%  Insulin infusion per CTS protocol   Insulin requirement  2/7: 38 units  2/8 80.6 units   2/9/23: 36.9 units-transitioned off insulin infusion  Minimal correctional insulin required      Assessment and Nursing Intervention   Nursing Diagnosis Risk for unstable blood glucose pattern   Nursing Intervention Domain 5250 Decision-making Support   Nursing Interventions Examined current inpatient diabetes/blood glucose control   Explored factors facilitating and impeding inpatient management  Explored corrective strategies with patient and responsible inpatient provider   Informed patient of rational for insulin strategy while hospitalized     Nursing Diagnosis 39018 Ineffective Health Management   Nursing Intervention Domain 5250 Decision-making Support   Nursing Interventions Identified diabetes self-management practices impeding diabetes control  Discussed diabetes survival skills related to  Healthy Plate eating plan; given handouts  Role of physical activity in improving insulin sensitivity and action  Procedure for blood glucose monitoring & options for low-cost products  Medications plan at discharge     Billing Code(s)   No charge    Before making these care recommendations, I personally reviewed the hospitalization record, including notes, laboratory & diagnostic data and current medications, and examined the patient at the bedside (circumstances permitting) before determining care. More than fifty (50) percent of the time was spent in patient counseling and/or care coordination.   Total minutes: <15    MARY KAY Bolaños  Diabetes Clinical Nurse Specialist  [facility]  Access via [address] [place] [address]

## 2023-02-11 ENCOUNTER — APPOINTMENT (OUTPATIENT)
Dept: GENERAL RADIOLOGY | Age: 73
DRG: 219 | End: 2023-02-11
Payer: COMMERCIAL

## 2023-02-11 LAB
ALBUMIN SERPL-MCNC: 3.1 G/DL (ref 3.5–5)
ALBUMIN/GLOB SERPL: 1.3 (ref 1.1–2.2)
ALP SERPL-CCNC: 44 U/L (ref 45–117)
ALT SERPL-CCNC: 14 U/L (ref 12–78)
ANION GAP SERPL CALC-SCNC: 2 MMOL/L (ref 5–15)
AST SERPL-CCNC: 13 U/L (ref 15–37)
BILIRUB SERPL-MCNC: 0.9 MG/DL (ref 0.2–1)
BUN SERPL-MCNC: 24 MG/DL (ref 6–20)
BUN/CREAT SERPL: 60 (ref 12–20)
CALCIUM SERPL-MCNC: 8.3 MG/DL (ref 8.5–10.1)
CHLORIDE SERPL-SCNC: 102 MMOL/L (ref 97–108)
CO2 SERPL-SCNC: 33 MMOL/L (ref 21–32)
CREAT SERPL-MCNC: 0.4 MG/DL (ref 0.55–1.02)
ERYTHROCYTE [DISTWIDTH] IN BLOOD BY AUTOMATED COUNT: 14.6 % (ref 11.5–14.5)
GLOBULIN SER CALC-MCNC: 2.3 G/DL (ref 2–4)
GLUCOSE BLD STRIP.AUTO-MCNC: 118 MG/DL (ref 65–117)
GLUCOSE BLD STRIP.AUTO-MCNC: 119 MG/DL (ref 65–117)
GLUCOSE BLD STRIP.AUTO-MCNC: 163 MG/DL (ref 65–117)
GLUCOSE BLD STRIP.AUTO-MCNC: 168 MG/DL (ref 65–117)
GLUCOSE SERPL-MCNC: 119 MG/DL (ref 65–100)
HCT VFR BLD AUTO: 29.9 % (ref 35–47)
HGB BLD-MCNC: 9.2 G/DL (ref 11.5–16)
MAGNESIUM SERPL-MCNC: 2.2 MG/DL (ref 1.6–2.4)
MCH RBC QN AUTO: 28 PG (ref 26–34)
MCHC RBC AUTO-ENTMCNC: 30.8 G/DL (ref 30–36.5)
MCV RBC AUTO: 91.2 FL (ref 80–99)
NRBC # BLD: 0 K/UL (ref 0–0.01)
NRBC BLD-RTO: 0 PER 100 WBC
PLATELET # BLD AUTO: 74 K/UL (ref 150–400)
PMV BLD AUTO: 12.5 FL (ref 8.9–12.9)
POTASSIUM SERPL-SCNC: 3.7 MMOL/L (ref 3.5–5.1)
PROT SERPL-MCNC: 5.4 G/DL (ref 6.4–8.2)
RBC # BLD AUTO: 3.28 M/UL (ref 3.8–5.2)
SERVICE CMNT-IMP: ABNORMAL
SODIUM SERPL-SCNC: 137 MMOL/L (ref 136–145)
WBC # BLD AUTO: 10.5 K/UL (ref 3.6–11)

## 2023-02-11 PROCEDURE — 65660000001 HC RM ICU INTERMED STEPDOWN

## 2023-02-11 PROCEDURE — 97530 THERAPEUTIC ACTIVITIES: CPT

## 2023-02-11 PROCEDURE — 82962 GLUCOSE BLOOD TEST: CPT

## 2023-02-11 PROCEDURE — 86900 BLOOD TYPING SEROLOGIC ABO: CPT

## 2023-02-11 PROCEDURE — 74011636637 HC RX REV CODE- 636/637: Performed by: NURSE PRACTITIONER

## 2023-02-11 PROCEDURE — 74011250637 HC RX REV CODE- 250/637: Performed by: NURSE PRACTITIONER

## 2023-02-11 PROCEDURE — 83735 ASSAY OF MAGNESIUM: CPT

## 2023-02-11 PROCEDURE — 80053 COMPREHEN METABOLIC PANEL: CPT

## 2023-02-11 PROCEDURE — 71045 X-RAY EXAM CHEST 1 VIEW: CPT

## 2023-02-11 PROCEDURE — 74011250637 HC RX REV CODE- 250/637

## 2023-02-11 PROCEDURE — 74011250636 HC RX REV CODE- 250/636: Performed by: NURSE PRACTITIONER

## 2023-02-11 PROCEDURE — 85027 COMPLETE CBC AUTOMATED: CPT

## 2023-02-11 PROCEDURE — 74011000250 HC RX REV CODE- 250: Performed by: NURSE PRACTITIONER

## 2023-02-11 PROCEDURE — 74011000250 HC RX REV CODE- 250

## 2023-02-11 PROCEDURE — 36415 COLL VENOUS BLD VENIPUNCTURE: CPT

## 2023-02-11 RX ORDER — BUMETANIDE 0.25 MG/ML
1 INJECTION INTRAMUSCULAR; INTRAVENOUS ONCE
Status: COMPLETED | OUTPATIENT
Start: 2023-02-11 | End: 2023-02-11

## 2023-02-11 RX ADMIN — FUROSEMIDE 40 MG: 10 INJECTION, SOLUTION INTRAMUSCULAR; INTRAVENOUS at 08:47

## 2023-02-11 RX ADMIN — MAGNESIUM OXIDE 400 MG (241.3 MG MAGNESIUM) TABLET 400 MG: TABLET at 17:32

## 2023-02-11 RX ADMIN — MONTELUKAST 10 MG: 10 TABLET, FILM COATED ORAL at 21:00

## 2023-02-11 RX ADMIN — BUMETANIDE 1 MG: 0.25 INJECTION, SOLUTION INTRAMUSCULAR; INTRAVENOUS at 17:32

## 2023-02-11 RX ADMIN — CARVEDILOL 6.25 MG: 6.25 TABLET, FILM COATED ORAL at 17:32

## 2023-02-11 RX ADMIN — ATORVASTATIN CALCIUM 40 MG: 40 TABLET, FILM COATED ORAL at 21:00

## 2023-02-11 RX ADMIN — MUPIROCIN: 20 OINTMENT TOPICAL at 17:32

## 2023-02-11 RX ADMIN — Medication 2 UNITS: at 12:32

## 2023-02-11 RX ADMIN — CHLORHEXIDINE GLUCONATE 10 ML: 1.2 RINSE ORAL at 08:48

## 2023-02-11 RX ADMIN — POLYETHYLENE GLYCOL 3350 17 G: 17 POWDER, FOR SOLUTION ORAL at 08:47

## 2023-02-11 RX ADMIN — SODIUM CHLORIDE, PRESERVATIVE FREE 10 ML: 5 INJECTION INTRAVENOUS at 21:08

## 2023-02-11 RX ADMIN — AMIODARONE HYDROCHLORIDE 400 MG: 200 TABLET ORAL at 17:32

## 2023-02-11 RX ADMIN — MUPIROCIN: 20 OINTMENT TOPICAL at 08:48

## 2023-02-11 RX ADMIN — PANTOPRAZOLE SODIUM 40 MG: 40 TABLET, DELAYED RELEASE ORAL at 08:07

## 2023-02-11 RX ADMIN — BISACODYL 10 MG: 10 SUPPOSITORY RECTAL at 08:52

## 2023-02-11 RX ADMIN — OXYCODONE HYDROCHLORIDE 5 MG: 5 TABLET ORAL at 21:00

## 2023-02-11 RX ADMIN — SODIUM CHLORIDE, PRESERVATIVE FREE 10 ML: 5 INJECTION INTRAVENOUS at 08:08

## 2023-02-11 RX ADMIN — SODIUM CHLORIDE, PRESERVATIVE FREE 10 ML: 5 INJECTION INTRAVENOUS at 13:35

## 2023-02-11 RX ADMIN — AMIODARONE HYDROCHLORIDE 400 MG: 200 TABLET ORAL at 08:47

## 2023-02-11 RX ADMIN — SENNOSIDES AND DOCUSATE SODIUM 1 TABLET: 50; 8.6 TABLET ORAL at 08:47

## 2023-02-11 RX ADMIN — CHLORHEXIDINE GLUCONATE 10 ML: 1.2 RINSE ORAL at 17:32

## 2023-02-11 RX ADMIN — CARVEDILOL 6.25 MG: 6.25 TABLET, FILM COATED ORAL at 08:47

## 2023-02-11 RX ADMIN — OXYBUTYNIN CHLORIDE 10 MG: 5 TABLET, EXTENDED RELEASE ORAL at 21:00

## 2023-02-11 RX ADMIN — ACETAMINOPHEN 1000 MG: 500 TABLET, FILM COATED ORAL at 08:07

## 2023-02-11 RX ADMIN — MAGNESIUM OXIDE 400 MG (241.3 MG MAGNESIUM) TABLET 400 MG: TABLET at 08:47

## 2023-02-11 NOTE — PROGRESS NOTES
0800 Bedside and Verbal shift change report given to Jaja Landaverde, [address] (oncoming nurse) by Cris Recinos, SHASHANK (offgoing nurse). Report included the following information SBAR, Kardex, OR Summary, Procedure Summary, Intake/Output, MAR, Recent Results, and Cardiac Rhythm AV paced . 1230 PT at the bedside. 1300 Family at the bedside. Uneventful shift. 2000 Bedside and Verbal shift change report given to Cris Recinos RN (oncoming nurse) by Jaja Landaverde RN (offgoing nurse). Report included the following information SBAR, Kardex, OR Summary, Procedure Summary, Intake/Output, MAR, Recent Results, and Cardiac Rhythm AV paced. Elli Hemphill

## 2023-02-11 NOTE — PROGRESS NOTES
[facility] ICU Progress Note    Admit Date: 2023  POD:  4 Day Post-Op    23    Procedure:  Procedure(s):  RIGHT FEMORAL CUTDOWN; RE-DO STERNOTOMY;  AORTIC VALVE REPLACEMENT (AVR) WITH 21MM INSPIRIS BIOPROSTHETIC VALVE; ASCENDING AORTIC ANEURYSM REPAIR WITH 28MM HEMASHIELD TUBE GRAFT; ECC; SHIVANI & EPIAORTIC US BY DR. ERNANDEZ [facility]       SHIVANI 23 DURING SURGERY  Indications: assessment of ascending aorta and assessment of surgical repair  Modalities: 2D, CF, CWD, contrast, PWD  Probe Type: multiplane  Insertion: atraumatic  Patient Status: intubated    Complications: None  Comments: Pre SHIVANI  SHIVANI views are distorted as the hear is rotated. LV 35-40%, LVH , septal and inf walls down. RV normal.  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 Post SHIVANI  LV fn preserved, ef 50%. RV normal  Bioprosthetic AV in situ, no AI, NO PVL, asc aorta repaired by tube graft. Mean gradient 6 mm across the AV. MR 2+,   LV fn came down to pre op level of ef 35%, same wall motion problems as before. Subjective/24hr events:   Pt seen with Dr. Mattie Harper. Patient left arm weakness improved, CT head shows no acute processes, sitting up in chair, conversational, appropriate    HIT negative, platelets to 36,172    Paced at 80 via PPM   Objective:   Pt synopsis:   POD1: Low CI. Increased dobutamine. Weaned off epi later in the day.  POD2: 1 unit platelets. Speech eval. PM increased 80, dobutamine weaned off.  2/10 POD3: Deline. Head CT. Cont to work with speech.  POD4: CT drainage continues high, continues net fluid positive, change diuresis to bumex    Vitals:  Blood pressure 128/65, pulse 80, temperature 98.4 °F (36.9 °C), resp. rate 15, height 5' (1.524 m), weight 184 lb 8.4 oz (83.7 kg), SpO2 96 %.   Temp (24hrs), Av.8 °F (37.1 °C), Min:98.4 °F (36.9 °C), Max:99.5 °F (37.5 °C)      EKG/Rhythm:  AV paced with PPM @ 80    Extubation Date / Time: extubated 23 1646    CT Output: 180/60, no air leak, on suction, serous drainage, then 230 this morning     Oxygen Therapy: 1-2L NC    CXR: 2/11 on L, 2/10 on R    CXR Results  (Last 48 hours)                 02/10/23 1147  XR CHEST PORT Final result    Impression:  Removal of [place] catheter, otherwise unchanged. Narrative:  INDICATION: post heart, fluid overload       EXAMINATION:  AP CHEST, PORTABLE       COMPARISON: Earlier today       FINDINGS: Single AP portable view of the chest demonstrates removal of [place]   catheter. Right IJ sheath and pleural/mediastinal drains remain in place. The   cardiomediastinal silhouette is unchanged. Left-sided atelectasis and probable   pleural effusion, unchanged. No pneumothorax. 02/10/23 0459  XR CHEST PORT Final result    Impression:  No significant change. Narrative:  INDICATION: post op heart       EXAMINATION:  AP CHEST, PORTABLE       COMPARISON: 2/9/2023       FINDINGS: Single AP portable view of the chest demonstrates no change in   position of the lines and tubes. The cardiomediastinal silhouette is unchanged. Small left pleural effusion and bibasilar atelectasis similar to prior study. No   pneumothorax.                    Admission Weight: Last Weight   Weight: 170 lb 3.1 oz (77.2 kg) Weight: 184 lb 8.4 oz (83.7 kg)     Intake / Output / Drain:  Current Shift: 02/11 0701 - 02/11 1900  In: -   Out: 60 [Drains:60]  Last 24 hrs.:   Intake/Output Summary (Last 24 hours) at 2/11/2023 0756  Last data filed at 2/11/2023 0746  Gross per 24 hour   Intake 1647.5 ml   Output 2345 ml   Net -697.5 ml       EXAM:  General: oriented and following commands, up in chair, smiling, appropriate  Lung: CTA apical, diminished bibasilar,fair effort  Incision: dry and intact, no drainage, sternum stable, left groin incision no erythema, hematoma or drainage, ecchymosis present  Heart: s1s2, reg rate and rhy, paced with ppm  Abdomen: BS present, soft, non-tender, no bruits. No BM yet. Extremities: warm, well perfused, +2 DP bilaterally  Neurologic: Follows simple commands, motor/sensory intact to right upper and bilateral lower extremities, decreased strength on L continues but improved from 2/9, minimal resistance to gravity    Labs:   Recent Labs     02/11/23  0737 02/11/23  0514   WBC  --  10.5   HGB  --  9.2*   HCT  --  29.9*   PLT  --  74*   NA  --  137   K  --  3.7   BUN  --  24*   CREA  --  0.40*   GLU  --  119*   GLUCPOC 118*  --         Assessment:     Active Problems: Aortic stenosis, severe (2/7/2023)      S/P aortic valve repair (2/7/2023)      Overview: RIGHT FEMORAL CUTDOWN; RIGHT FEMORAL ARTERIAL CANNULATION,  RE-DO       STERNOTOMY;  HYPOTHERMIC ARREST, ANTEGRADE CEREBRAL PERFUSION VIA       INNOMINATE ARTERY, REDO AORTIC VALVE REPLACEMENT (AVR) WITH 21MM INSPIRIS       BIOPROSTHETIC VALVE; ASCENDING AORTIC ANEURYSM REPAIR WITH 28MM HEMASHIELD       TUBE GRAFT; REPAIR AORTIC TEAR, PULMONARY ARTERY REPAIR, RIGHT ATRIAL       REPAIR, ECC; SHIVANI & EPIAORTIC US BY DR. ERNANDEZ [facility]       Plan/Recommendations/Medical Decision Making:     Ascending aortic aneurysm, end of life bioprosthetic aortic valve s/p redo sternotomy, redo aortic valve replacement, repair ascending aortic aneurysm with tube graft, left femoral cutdown and arterial cannulation on 2/7/23. ASA on hold d/t thrombocytopenia. Cont chest tubes today. SBP goal < 140, coreg starting this am  Cardiogenic shock s/p redo sternotomy, redo AVR. PPM increased to rate of 80. Now off drips. Resolved. Acute respiratory failure with hypoxemia s/p redo sternotomy, redo AVR, on 1-2L NC, fair effort. Cont to diurese today. - Pulm toilet: acapella, IS, cough, deep breathe, ambulate   Post operative delirium s/p hypothermic arrest, mentation much more appropriate, worse in evenings  - strict day/night schedule  - avoid benzos and narcs  Left arm weakness unknown etiology, continues to improved.  Concern for TIA prior to surgery: Patient seen in ED 1 week ago, all testing discussed. Per Neurology is safe to proceed with surgery, they favor it possibly being amaurosis fugax. Per Discharge Summary from hospitalist, consider ASA and plavix at discharge. Head CT 2/10, no acute processes  Mixed cardiac and non-cardiac fluid overload moderate response to lasix at 40mg BID, will give one dose bumex and hold lasix, monitor response  Post operative coagulopathy secondary to prolonged cardiopulmonary bypass, currently stable, received multiple blood products to stabilize. Resolved. Acute blood loss anemia, stable, monitor daily. Above transfusion threshold. Thrombocytopenia, HIT panel negative, platelets recovering  CHFrEF: EF 45% on 2/1/23, Non-preserved EF heart failure with documented EF 35-45% via SHIVANI in surgery. GDMT as follows:  - Coreg to begin 2/11 am  - Losartan PTA, no ready for this in periop period  - spironolactone outpatient  - may add jardiance closer to discharge  - transition diuretic to bumex and evaluate response  Hx hypertension, PTA losartan 50mg daily - last dose 2/4  Hx hyperlipidemia, PTA atorvastatin 40mg daily, resumed  Hx complete heart block r/t post op AVR: [employer] pacemaker implanted in 2014 (with lead revision) patient states has 8 months of battery left on pacemaker. PPM interrogated and functioning properly, rate increased to 80. Asthma: No inhalers noted, hx smoker. GERD: PTA omeprazole 40mg daily. Currently on protonix 40 mg po  Post operative debility secondary to open heart surgery: PT/OT/cardiac rehab, may need placement at discharge, anticipate d/c next week.     Disposition: intermediate status, no bed yet, oxygening weaning, needs aggressive therapy, tolerating BB    Signed By: KASSIE Cornejo

## 2023-02-11 NOTE — PROGRESS NOTES
Problem: Mobility Impaired (Adult and Pediatric)  Goal: *Acute Goals and Plan of Care (Insert Text)  Description: FUNCTIONAL STATUS PRIOR TO ADMISSION: Not able to obtain PLOF information, patient oriented x 1 and demonstrates perseveration during conversation. Will update as information becomes available. HOME SUPPORT PRIOR TO ADMISSION: Not able to obtain PLOF information, patient oriented x 1 and demonstrates perseveration during conversation. Will update as information becomes available. Physical Therapy Goals  Initiated 2/9/2023  1. Patient will move from supine to sit and sit to supine  in bed with maximal assistance within 5 days. 2.  Patient will perform sit to/from stand with maximal assistance within 5 days. 3.  Patient will ambulate 5 feet with least restrictive assistive device and maximal assistance within 5 days. 4.  Patient will ascend/descend 5 stairs with one handrail(s) with maximal assistance within 5 days. 5.  Patient will perform cardiac exercises per protocol with minimal assistance/contact guard assist within 5 days. 6.  Patient will verbally recall and functionally demonstrate mindful-based movements (\"move in the tube\") principles without cues within 5 days. Outcome: Progressing Towards Goal   PHYSICAL THERAPY TREATMENT  Patient: Maritza Wooten (16 y.o. female)  Date: 2/11/2023  Diagnosis: Aortic stenosis, severe [I35.0] <principal problem not specified>  Procedure(s) (LRB):  RIGHT FEMORAL CUTDOWN; RE-DO STERNOTOMY;  AORTIC VALVE REPLACEMENT (AVR) WITH 21MM INSPIRIS BIOPROSTHETIC VALVE; ASCENDING AORTIC ANEURYSM REPAIR WITH 28MM HEMASHIELD TUBE GRAFT; ECC; SHIVANI & EPIAORTIC US BY DR. ERNANDEZ [facility] (N/A) 4 Days Post-Op  Precautions: Fall, Sternal  Chart, physical therapy assessment, plan of care and goals were reviewed. ASSESSMENT  Patient continues with skilled PT services and is progressing towards goals.  Noted less LLE weakness and improved overall balance compared to last session. She does still have LUE weakness compared to R. She was on room air on arrival, but SpO2 88% at rest.  Even with PLB and incentive spirometer, she remained 89-90% at rest, so replaced 1L O2 nasal cannula. SpO2 remained low to mid 90s on the 1L. Worked on sit to stand transitions, standing balance and mini-squats, noting mild L lean and quick fatigue after about 30 seconds of standing. In sitting, she performed beginning cardiac post op upper body exercises, noting limitation of those that require more LUE strength to balance the movement, such as forward flexion of the arms and horiz abduction. Continued education about value of mobility and importance of using the LUE as much as possible, even though it is not her dominant hand. Continue to recommend rehab once medically ready, as she remains well below her baseline level of function and would not be safe at home with family at this level. Patient is demonstrating understanding of mindful-based movements (\"move in the tube\") principles of keeping UEs proximal to ribcage to prevent lateral pull on the sternum during load-bearing activities with verbal cues required for compliance. Current Level of Function Impacting Discharge (mobility/balance): mod assist for standing and transfers    Other factors to consider for discharge: recommend rehab         PLAN :  Patient continues to benefit from skilled intervention to address the above impairments. Continue treatment per established plan of care. to address goals.     Recommendation for discharge: (in order for the patient to meet his/her long term goals)  Therapy 3 hours per day 5-7 days per week    This discharge recommendation:  Has not yet been discussed the attending provider and/or case management    IF patient discharges home will need the following DME: to be determined (TBD)       SUBJECTIVE:   Patient stated Negro Bautista got a little dizzy when I first got up, but I feel OK now, just weak and tired.     OBJECTIVE DATA SUMMARY:   Patient mobilized on continuous portable monitor/telemetry. Critical Behavior:  Neurologic State: Alert  Orientation Level: Oriented X4  Cognition: Follows commands  Safety/Judgement: Fall prevention, Home safety    Functional Mobility Training:  Bed Mobility:                      Transfers:  Sit to Stand: Moderate assistance; Additional time  Stand to Sit: Minimum assistance; Additional time                               Balance:  Sitting: Intact; Without support (while in the chair, but sitting up away from back of chair)  Standing: Impaired; With support  Standing - Static: Constant support; Fair (mild L lean)  Standing - Dynamic : Constant support; Fair (mild left lean)    Ambulation/Gait Training:                                                       Stairs:             Cardiac diagnosis intervention:  Patient instructed and educated on mindful movement principles based on Move in The Tube concept to include maintaining bilateral elbows close to rib cage when performing any load-bearing activity such as getting in/out of bed, pushing up from a chair, opening a door, or lifting a box. Patient was given a handout with diagrams of each correct/incorrect method of performing each of the above tasks. Therapeutic Exercises:   Patient instructed on the benefits and demonstrated cardiac exercises while sitting with Supervision. Instructed and indicated understanding on how to progress reps, sets against gravity, pacing through progressive muscle strengthening standing based on surgeon clearance for more weight in prep for functional activity. Instruction on the use of household items in place of weights as needed.      CARDIAC  EXERCISE   Sets   Reps   Active Active Assist   Passive Self ROM   Comments   Shoulder flexion 1 5 [x]                                            []                                            []                                            [] Scapular retraction 1 5 [x]                                            []                                            []                                            []                                               Trunk rotation 1 5 [x]                                            []                                            []                                            [x]                                               Trunk sidebending 1 5 [x]                                            []                                            []                                            []                                                  []                                            []                                            []                                            []                                                   Pain Rating:  Chest pain with coughing    Activity Tolerance:   Fair, desaturates with exertion and requires oxygen, requires frequent rest breaks, and observed SOB with activity    After treatment patient left in no apparent distress:   Sitting in chair and Call bell within reach    COMMUNICATION/COLLABORATION:   The patients plan of care was discussed with: Registered nurse.      Yamileth Fan, PT   Time Calculation: 19 mins

## 2023-02-12 ENCOUNTER — APPOINTMENT (OUTPATIENT)
Dept: GENERAL RADIOLOGY | Age: 73
DRG: 219 | End: 2023-02-12
Payer: COMMERCIAL

## 2023-02-12 LAB
ALBUMIN SERPL-MCNC: 3 G/DL (ref 3.5–5)
ALBUMIN/GLOB SERPL: 1 (ref 1.1–2.2)
ALP SERPL-CCNC: 49 U/L (ref 45–117)
ALT SERPL-CCNC: 14 U/L (ref 12–78)
ANION GAP SERPL CALC-SCNC: 7 MMOL/L (ref 5–15)
AST SERPL-CCNC: 13 U/L (ref 15–37)
BILIRUB SERPL-MCNC: 1 MG/DL (ref 0.2–1)
BUN SERPL-MCNC: 19 MG/DL (ref 6–20)
BUN/CREAT SERPL: 43 (ref 12–20)
CALCIUM SERPL-MCNC: 8.7 MG/DL (ref 8.5–10.1)
CHLORIDE SERPL-SCNC: 98 MMOL/L (ref 97–108)
CO2 SERPL-SCNC: 33 MMOL/L (ref 21–32)
CREAT SERPL-MCNC: 0.44 MG/DL (ref 0.55–1.02)
ERYTHROCYTE [DISTWIDTH] IN BLOOD BY AUTOMATED COUNT: 14.3 % (ref 11.5–14.5)
GLOBULIN SER CALC-MCNC: 3 G/DL (ref 2–4)
GLUCOSE BLD STRIP.AUTO-MCNC: 102 MG/DL (ref 65–117)
GLUCOSE BLD STRIP.AUTO-MCNC: 105 MG/DL (ref 65–117)
GLUCOSE BLD STRIP.AUTO-MCNC: 109 MG/DL (ref 65–117)
GLUCOSE BLD STRIP.AUTO-MCNC: 170 MG/DL (ref 65–117)
GLUCOSE SERPL-MCNC: 114 MG/DL (ref 65–100)
HCT VFR BLD AUTO: 33.7 % (ref 35–47)
HGB BLD-MCNC: 10.2 G/DL (ref 11.5–16)
MAGNESIUM SERPL-MCNC: 2.1 MG/DL (ref 1.6–2.4)
MCH RBC QN AUTO: 27.6 PG (ref 26–34)
MCHC RBC AUTO-ENTMCNC: 30.3 G/DL (ref 30–36.5)
MCV RBC AUTO: 91.1 FL (ref 80–99)
NRBC # BLD: 0 K/UL (ref 0–0.01)
NRBC BLD-RTO: 0 PER 100 WBC
PLATELET # BLD AUTO: 113 K/UL (ref 150–400)
PMV BLD AUTO: 12 FL (ref 8.9–12.9)
POTASSIUM SERPL-SCNC: 3.3 MMOL/L (ref 3.5–5.1)
PROT SERPL-MCNC: 6 G/DL (ref 6.4–8.2)
RBC # BLD AUTO: 3.7 M/UL (ref 3.8–5.2)
SERVICE CMNT-IMP: ABNORMAL
SERVICE CMNT-IMP: NORMAL
SODIUM SERPL-SCNC: 138 MMOL/L (ref 136–145)
WBC # BLD AUTO: 9.4 K/UL (ref 3.6–11)

## 2023-02-12 PROCEDURE — 83735 ASSAY OF MAGNESIUM: CPT

## 2023-02-12 PROCEDURE — 74011000250 HC RX REV CODE- 250

## 2023-02-12 PROCEDURE — 71045 X-RAY EXAM CHEST 1 VIEW: CPT

## 2023-02-12 PROCEDURE — 36415 COLL VENOUS BLD VENIPUNCTURE: CPT

## 2023-02-12 PROCEDURE — 80053 COMPREHEN METABOLIC PANEL: CPT

## 2023-02-12 PROCEDURE — 74011250637 HC RX REV CODE- 250/637

## 2023-02-12 PROCEDURE — 97530 THERAPEUTIC ACTIVITIES: CPT

## 2023-02-12 PROCEDURE — 85027 COMPLETE CBC AUTOMATED: CPT

## 2023-02-12 PROCEDURE — 65660000001 HC RM ICU INTERMED STEPDOWN

## 2023-02-12 PROCEDURE — 74011250636 HC RX REV CODE- 250/636: Performed by: NURSE PRACTITIONER

## 2023-02-12 PROCEDURE — 74011250637 HC RX REV CODE- 250/637: Performed by: NURSE PRACTITIONER

## 2023-02-12 PROCEDURE — 74011000250 HC RX REV CODE- 250: Performed by: NURSE PRACTITIONER

## 2023-02-12 PROCEDURE — 97116 GAIT TRAINING THERAPY: CPT

## 2023-02-12 PROCEDURE — 82962 GLUCOSE BLOOD TEST: CPT

## 2023-02-12 RX ADMIN — MUPIROCIN: 20 OINTMENT TOPICAL at 08:36

## 2023-02-12 RX ADMIN — ACETAZOLAMIDE SODIUM 250 MG: 500 INJECTION, POWDER, LYOPHILIZED, FOR SOLUTION INTRAVENOUS at 12:59

## 2023-02-12 RX ADMIN — AMIODARONE HYDROCHLORIDE 400 MG: 200 TABLET ORAL at 08:35

## 2023-02-12 RX ADMIN — OXYBUTYNIN CHLORIDE 10 MG: 5 TABLET, EXTENDED RELEASE ORAL at 21:13

## 2023-02-12 RX ADMIN — SODIUM CHLORIDE, PRESERVATIVE FREE 10 ML: 5 INJECTION INTRAVENOUS at 21:13

## 2023-02-12 RX ADMIN — MAGNESIUM OXIDE 400 MG (241.3 MG MAGNESIUM) TABLET 400 MG: TABLET at 08:35

## 2023-02-12 RX ADMIN — SODIUM CHLORIDE, PRESERVATIVE FREE 10 ML: 5 INJECTION INTRAVENOUS at 13:01

## 2023-02-12 RX ADMIN — CARVEDILOL 6.25 MG: 6.25 TABLET, FILM COATED ORAL at 18:06

## 2023-02-12 RX ADMIN — MONTELUKAST 10 MG: 10 TABLET, FILM COATED ORAL at 21:13

## 2023-02-12 RX ADMIN — ACETAMINOPHEN 1000 MG: 500 TABLET, FILM COATED ORAL at 06:43

## 2023-02-12 RX ADMIN — CHLORHEXIDINE GLUCONATE 10 ML: 1.2 RINSE ORAL at 08:35

## 2023-02-12 RX ADMIN — CHLORHEXIDINE GLUCONATE 10 ML: 1.2 RINSE ORAL at 18:06

## 2023-02-12 RX ADMIN — ATORVASTATIN CALCIUM 40 MG: 40 TABLET, FILM COATED ORAL at 21:13

## 2023-02-12 RX ADMIN — ASPIRIN 81 MG: 81 TABLET, CHEWABLE ORAL at 08:35

## 2023-02-12 RX ADMIN — ACETAZOLAMIDE SODIUM 250 MG: 500 INJECTION, POWDER, LYOPHILIZED, FOR SOLUTION INTRAVENOUS at 18:06

## 2023-02-12 RX ADMIN — MAGNESIUM OXIDE 400 MG (241.3 MG MAGNESIUM) TABLET 400 MG: TABLET at 18:06

## 2023-02-12 RX ADMIN — SODIUM CHLORIDE, PRESERVATIVE FREE 10 ML: 5 INJECTION INTRAVENOUS at 06:54

## 2023-02-12 RX ADMIN — CARVEDILOL 6.25 MG: 6.25 TABLET, FILM COATED ORAL at 08:35

## 2023-02-12 RX ADMIN — PANTOPRAZOLE SODIUM 40 MG: 40 TABLET, DELAYED RELEASE ORAL at 06:44

## 2023-02-12 RX ADMIN — SENNOSIDES AND DOCUSATE SODIUM 1 TABLET: 50; 8.6 TABLET ORAL at 08:35

## 2023-02-12 RX ADMIN — Medication 6 MG: at 21:13

## 2023-02-12 RX ADMIN — AMIODARONE HYDROCHLORIDE 400 MG: 200 TABLET ORAL at 18:06

## 2023-02-12 NOTE — PROGRESS NOTES
Problem: Mobility Impaired (Adult and Pediatric)  Goal: *Acute Goals and Plan of Care (Insert Text)  Description: FUNCTIONAL STATUS PRIOR TO ADMISSION: Not able to obtain PLOF information, patient oriented x 1 and demonstrates perseveration during conversation. Will update as information becomes available. HOME SUPPORT PRIOR TO ADMISSION: Not able to obtain PLOF information, patient oriented x 1 and demonstrates perseveration during conversation. Will update as information becomes available. Physical Therapy Goals  Initiated 2/9/2023  1. Patient will move from supine to sit and sit to supine  in bed with maximal assistance within 5 days. 2.  Patient will perform sit to/from stand with maximal assistance within 5 days. 3.  Patient will ambulate 5 feet with least restrictive assistive device and maximal assistance within 5 days. 4.  Patient will ascend/descend 5 stairs with one handrail(s) with maximal assistance within 5 days. 5.  Patient will perform cardiac exercises per protocol with minimal assistance/contact guard assist within 5 days. 6.  Patient will verbally recall and functionally demonstrate mindful-based movements (\"move in the tube\") principles without cues within 5 days. Outcome: Progressing Towards Goal    PHYSICAL THERAPY TREATMENT  Patient: Desi Veliz (21 y.o. female)  Date: 2/12/2023  Diagnosis: Aortic stenosis, severe [I35.0] <principal problem not specified>  Procedure(s) (LRB):  RIGHT FEMORAL CUTDOWN; RE-DO STERNOTOMY;  AORTIC VALVE REPLACEMENT (AVR) WITH 21MM INSPIRIS BIOPROSTHETIC VALVE; ASCENDING AORTIC ANEURYSM REPAIR WITH 28MM HEMASHIELD TUBE GRAFT; ECC; SHIVANI & EPIAORTIC US BY DR. ERNANDEZ [facility] (N/A) 5 Days Post-Op  Precautions: Fall, Sternal  Chart, physical therapy assessment, plan of care and goals were reviewed. ASSESSMENT  Patient continues with skilled PT services and is progressing towards goals.  Pt received supine in bed with spouse in room, willing to work with therapy. Pt seen today following having CT removed and cleared by RN for activity. Pt showed improvement with session with improvement with assistance with all mobility decreased, continued with gait training with RW with reported fatigue. Pt limited by impaired balance, strength and endurance, reports that she completed 2 sets of the UE ex prior to session reps of 5. Pt able to demonstrate good move in tube precautions, able to tolerate bed mobility to EOB with 2L 02, continued with SPT with HHA with seated rest break and no reports of SOB or pain. Pt continued with RA seated with VSS, continued with gait training with VC for balance only with move in tube in mind with amb, min A for RW with turning needed. Pt able to return to chair with VSS on RA, completed session seated with LE support with stool, all needs met at the time and spouse in room. Rn notified of session. Patient is not verbalizing and is demonstrating understanding of mindful-based movements (\"move in the tube\") principles of keeping UEs proximal to ribcage to prevent lateral pull on the sternum during load-bearing activities with verbal cues required for compliance. Current Level of Function Impacting Discharge (mobility/balance): min/CGA for all mobility, amb up to 12' with RW    Other factors to consider for discharge: move in tube. PLAN :  Patient continues to benefit from skilled intervention to address the above impairments. Continue treatment per established plan of care. to address goals.     Recommendation for discharge: (in order for the patient to meet his/her long term goals)  Therapy 3 hours per day 5-7 days per week VS HHPT pending progress    This discharge recommendation:  Has not yet been discussed the attending provider and/or case management    IF patient discharges home will need the following DME: to be determined (TBD)       SUBJECTIVE:   Patient stated I am doing better today, and breathing is much easier now.     OBJECTIVE DATA SUMMARY:   Patient mobilized on continuous portable monitor/telemetry. Critical Behavior:  Neurologic State: Alert  Orientation Level: Oriented X4  Cognition: Follows commands  Safety/Judgement: Fall prevention, Home safety    Functional Mobility Training:  Bed Mobility:  Rolling: Minimum assistance; Moderate assistance; Additional time  Scooting: Minimum assistance; Additional time    Transfers:  Sit to Stand: Contact guard assistance; Additional time  Stand to Sit: Contact guard assistance; Additional time  Bed to Chair: Contact guard assistance; Additional time    Balance:  Sitting: Intact; With support  Standing: Impaired; Without support  Standing - Static: Constant support;Good  Standing - Dynamic : Constant support;Good    Ambulation/Gait Training:  Distance (ft): 16 Feet (ft)  Assistive Device: Gait belt;Walker, rolling  Ambulation - Level of Assistance: Contact guard assistance; Additional time  Gait Abnormalities: Decreased step clearance  Base of Support: Narrowed  Speed/Lilo: Pace decreased (<100 feet/min); Shuffled  Step Length: Left shortened;Right shortened    Cardiac diagnosis intervention:  Patient instructed and educated on mindful movement principles based on Move in The Tube concept to include maintaining bilateral elbows close to rib cage when performing any load-bearing activity such as getting in/out of bed, pushing up from a chair, opening a door, or lifting a box. Patient was given a handout with diagrams of each correct/incorrect method of performing each of the above tasks. Pain Rating:  No pain reported    Activity Tolerance:   Fair and requires rest breaks    After treatment patient left in no apparent distress:   Sitting in chair, Call bell within reach, and Caregiver / family present    COMMUNICATION/COLLABORATION:   The patients plan of care was discussed with: Registered nurse.      Yousuf Waite PTA   Time Calculation: 34 mins

## 2023-02-12 NOTE — ROUTINE PROCESS
0800 Bedside and Verbal shift change report given to Chuck Helton [place] (oncoming nurse) by Jade Loera RN (offgoing nurse). Report included the following information SBAR, Kardex, OR Summary, Procedure Summary, Intake/Output, MAR, Recent Results, and Cardiac Rhythm AV paced . 1230 Epicardial wire was removed by Diane Fernandez NP.     1310 CT were removed by Diane Fernandez NP    1330 PT at the bedside. 2000 Bedside and Verbal shift change report given to Jade Loera RN (oncoming nurse) by Chuck Helton RN (offgoing nurse). Report included the following information SBAR, Kardex, OR Summary, Procedure Summary, Intake/Output, MAR, and Recent Results AV paced.

## 2023-02-12 NOTE — PROGRESS NOTES
Bedside and Verbal shift change report given to [facility] (oncoming nurse) by Johnnie Stevens (offgoing nurse). Report included the following information SBAR and Recent Results. Uneventful shift. Pt placed on 1 L nasal cannula while sleeping to keep SpO2 >90%. Bedside and Verbal shift change report given to [facility] (oncoming nurse) by Johnnie Stevens (offgoing nurse). Report included the following information SBAR and Recent Results.

## 2023-02-12 NOTE — PROGRESS NOTES
[facility] ICU Progress Note    Admit Date: 2023  POD:  5 Day Post-Op    23    Procedure:  Procedure(s):  RIGHT FEMORAL CUTDOWN; RE-DO STERNOTOMY;  AORTIC VALVE REPLACEMENT (AVR) WITH 21MM INSPIRIS BIOPROSTHETIC VALVE; ASCENDING AORTIC ANEURYSM REPAIR WITH 28MM HEMASHIELD TUBE GRAFT; ECC; SHIVANI & EPIAORTIC US BY DR. ERNANDEZ [facility]       SHIVANI 23 DURING SURGERY  Indications: assessment of ascending aorta and assessment of surgical repair  Modalities: 2D, CF, CWD, contrast, PWD  Probe Type: multiplane  Insertion: atraumatic  Patient Status: intubated    Complications: None  Comments: Pre SHIVANI  SHIVANI views are distorted as the hear is rotated. LV 35-40%, LVH , septal and inf walls down. RV normal.  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 Post SHIVANI  LV fn preserved, ef 50%. RV normal  Bioprosthetic AV in situ, no AI, NO PVL, asc aorta repaired by tube graft. Mean gradient 6 mm across the AV. MR 2+,   LV fn came down to pre op level of ef 35%, same wall motion problems as before. Subjective/24hr events:   Pt seen with Dr. Sunita Santana. Arm strength almost equal bilaterally, having bowel movements,tolerating diet, on room air, CT drainage still high    HIT negative, platelets to 386,173    Paced at 80 via PPM   Objective:   Pt synopsis:   POD1: Low CI. Increased dobutamine. Weaned off epi later in the day.  POD2: 1 unit platelets. Speech eval. PM increased 80, dobutamine weaned off.  2/10 POD3: Deline. Head CT. Cont to work with speech.  POD4: CT drainage continues high, continues net fluid positive, change diuresis to bumex   POD5: contraction alkalosis, change diuretic to diamox for 3 doses    Vitals:  Blood pressure 126/67, pulse 80, temperature 98.3 °F (36.8 °C), resp. rate 15, height 5' (1.524 m), weight 178 lb 6.4 oz (80.9 kg), SpO2 95 %.   Temp (24hrs), Av.5 °F (36.9 °C), Min:98.2 °F (36.8 °C), Max:99 °F (37.2 °C)      EKG/Rhythm: AV paced with PPM @ 80    Extubation Date / Time: extubated 2/8/23 1646    CT Output: 330/210/540 last 24 hours, up in chair and 80 serous for last 4 hours. Oxygen Therapy: room air while in chair. 2l NC while sleeping    CXR: 2/12 on L, 2/11 on R    CXR Results  (Last 48 hours)                 02/11/23 0522  XR CHEST PORT Final result    Impression:      No change on limited portable chest view. Recommend PA and lateral chest views   when the patient can better tolerate. Narrative:  EXAM: XR CHEST PORT       INDICATION: Postoperative heart with fluid overload. COMPARISON: Portable chest on 2/10/2023 and 2/8/2023. TECHNIQUE: Semiupright portable chest AP view       FINDINGS: Right jugular vascular sheath is unchanged. Sternotomy wires and   aortic valve prosthesis are unchanged. Chest tubes are unchanged. Pacemaker   battery pack and leads are unchanged. Cardiomegaly is unchanged. Aortic arch is   unchanged. The pulmonary vasculature is within normal limits. Subtle pulmonary airspace opacities are unchanged and greatest in the left lung   base. No pneumothorax. Low lung volumes. Large body habitus and osteopenia.           02/10/23 1147  XR CHEST PORT Final result    Impression:  Removal of Lenox-Lilian catheter, otherwise unchanged. Narrative:  INDICATION: post heart, fluid overload       EXAMINATION:  AP CHEST, PORTABLE       COMPARISON: Earlier today       FINDINGS: Single AP portable view of the chest demonstrates removal of Lenox-Lilian   catheter. Right IJ sheath and pleural/mediastinal drains remain in place. The   cardiomediastinal silhouette is unchanged. Left-sided atelectasis and probable   pleural effusion, unchanged. No pneumothorax. Admission Weight: Last Weight   Weight: 170 lb 3.1 oz (77.2 kg) Weight: 178 lb 6.4 oz (80.9 kg)     Intake / Output / Drain:  Current Shift: No intake/output data recorded.   Last 24 hrs.:   Intake/Output Summary (Last 24 hours) at 2/12/2023 0713  Last data filed at 2/12/2023 0630  Gross per 24 hour   Intake 720 ml   Output 2690 ml   Net -1970 ml       EXAM:  General: oriented and following commands, up in chair, smiling, appropriate  Lung: CTA apical, diminished bibasilar,fair effort  Incision: dry and intact, no drainage, sternum stable, left groin incision no erythema, hematoma or drainage, ecchymosis present  Heart: s1s2, reg rate and rhy, paced with ppm  Abdomen: BS present, soft, non-tender, no bruits. + BM  Extremities: warm, well perfused, +2 DP bilaterally  Neurologic: alert and oriented to person, place, time and situation, normal thought processes, motor/sensory intact to right upper and bilateral lower extremities, only slight strength change on L continues but improved    Labs:   Recent Labs     02/12/23  0651 02/12/23  0450   WBC  --  9.4   HGB  --  10.2*   HCT  --  33.7*   PLT  --  113*   NA  --  138   K  --  3.3*   BUN  --  19   CREA  --  0.44*   GLU  --  114*   GLUCPOC 105  --         Assessment:     Active Problems: Aortic stenosis, severe (2/7/2023)      S/P aortic valve repair (2/7/2023)      Overview: RIGHT FEMORAL CUTDOWN; RIGHT FEMORAL ARTERIAL CANNULATION,  RE-DO       STERNOTOMY;  HYPOTHERMIC ARREST, ANTEGRADE CEREBRAL PERFUSION VIA       INNOMINATE ARTERY, REDO AORTIC VALVE REPLACEMENT (AVR) WITH 21MM INSPIRIS       BIOPROSTHETIC VALVE; ASCENDING AORTIC ANEURYSM REPAIR WITH 28MM HEMASHIELD       TUBE GRAFT; REPAIR AORTIC TEAR, PULMONARY ARTERY REPAIR, RIGHT ATRIAL       REPAIR, ECC; SHIVANI & EPIAORTIC US BY DR. ERNANDEZ [facility]       Plan/Recommendations/Medical Decision Making:     Ascending aortic aneurysm, end of life bioprosthetic aortic valve s/p redo sternotomy, redo aortic valve replacement, repair ascending aortic aneurysm with tube graft, left femoral cutdown and arterial cannulation on 2/7/23. ASA on hold d/t thrombocytopenia.  SBP goal < 140, coreg starting this am   -pull CT   -pull pacing wires  Cardiogenic shock s/p redo sternotomy, redo AVR. PPM increased to rate of 80. Now off drips. Resolved. Acute respiratory failure with hypoxemia s/p redo sternotomy, redo AVR, room air while awake, 1-2L NC when sleeping, fair effort. Contraction alkalosis, change to diamox for next 24 hours  - Pulm toilet: [place]ella, IS, cough, deep breathe, ambulate   Post operative delirium s/p hypothermic arrest - Resolved mentation at baseline  - strict day/night schedule  - avoid benzos and narcs  Left arm weakness unknown etiology, continues to improved. Concern for TIA prior to surgery: Patient seen in ED 1 week ago, all testing discussed. Per Neurology is safe to proceed with surgery, they favor it possibly being amaurosis fugax. Per Discharge Summary from hospitalist, start ASA and add plavix at discharge. Head CT 2/10, no acute processes  Mixed cardiac and non-cardiac fluid overload with contraction alkalosis and serum CO2 33, will utilize dimox today for diuresis  Post operative coagulopathy secondary to prolonged cardiopulmonary bypass, currently stable, received multiple blood products to stabilize. Resolved. Acute blood loss anemia, stable, monitor daily. Above transfusion threshold. Thrombocytopenia, resolving, HIT panel negative, platelets recovering, add asa today  CHFrEF: EF 45% on 2/1/23, Non-preserved EF heart failure with documented EF 35-45% via SHIVANI in surgery. GDMT as follows:  - Coreg to begin 2/11 am  - Losartan PTA, no ready for this in periop period  - spironolactone outpatient  - may add jardiance closer to discharge  - transition diuretic to bumex and evaluate response  Hx hypertension, PTA losartan 50mg daily - last dose 2/4  Hx hyperlipidemia, PTA atorvastatin 40mg daily, resumed  Hx complete heart block r/t post op AVR: [employer] pacemaker implanted in 2014 (with lead revision) patient states has 8 months of battery left on pacemaker. PPM interrogated and functioning properly, rate increased to 80.   Asthma: No inhalers noted, hx smoker. GERD: PTA omeprazole 40mg daily. Currently on protonix 40 mg po  Post operative debility secondary to open heart surgery: PT/OT/cardiac rehab, may need placement at discharge, anticipate d/c next week.     Disposition: intermediate status, no bed yet, oxygening weaning, needs aggressive therapy, tolerating BB, diuresis, add asa today    Signed By: KASSIE Cosme

## 2023-02-12 NOTE — PROGRESS NOTES
REHANA- Pending referral to [facility]    CM spoke with pt's  about pt going to inpt rehab. CM offered him freedom of choice and he chose [facility]. ESTELITA sent a referral to [facility] via [place].  Sully Ness

## 2023-02-13 ENCOUNTER — APPOINTMENT (OUTPATIENT)
Dept: GENERAL RADIOLOGY | Age: 73
DRG: 219 | End: 2023-02-13
Payer: COMMERCIAL

## 2023-02-13 ENCOUNTER — APPOINTMENT (OUTPATIENT)
Dept: GENERAL RADIOLOGY | Age: 73
DRG: 219 | End: 2023-02-13
Attending: NURSE PRACTITIONER
Payer: COMMERCIAL

## 2023-02-13 ENCOUNTER — APPOINTMENT (OUTPATIENT)
Dept: NON INVASIVE DIAGNOSTICS | Age: 73
DRG: 219 | End: 2023-02-13
Payer: COMMERCIAL

## 2023-02-13 LAB
ALBUMIN SERPL-MCNC: 3 G/DL (ref 3.5–5)
ALBUMIN/GLOB SERPL: 1 (ref 1.1–2.2)
ALP SERPL-CCNC: 52 U/L (ref 45–117)
ALT SERPL-CCNC: 16 U/L (ref 12–78)
ANION GAP SERPL CALC-SCNC: 6 MMOL/L (ref 5–15)
AST SERPL-CCNC: 14 U/L (ref 15–37)
BILIRUB SERPL-MCNC: 0.8 MG/DL (ref 0.2–1)
BUN SERPL-MCNC: 14 MG/DL (ref 6–20)
BUN/CREAT SERPL: 31 (ref 12–20)
CALCIUM SERPL-MCNC: 8.8 MG/DL (ref 8.5–10.1)
CHLORIDE SERPL-SCNC: 105 MMOL/L (ref 97–108)
CO2 SERPL-SCNC: 25 MMOL/L (ref 21–32)
CREAT SERPL-MCNC: 0.45 MG/DL (ref 0.55–1.02)
ECHO AO ROOT DIAM: 2.8 CM
ECHO AO ROOT INDEX: 1.58 CM/M2
ECHO AV AREA PEAK VELOCITY: 1.4 CM2
ECHO AV AREA VTI: 1.3 CM2
ECHO AV AREA/BSA PEAK VELOCITY: 0.8 CM2/M2
ECHO AV AREA/BSA VTI: 0.7 CM2/M2
ECHO AV MEAN GRADIENT: 20 MMHG
ECHO AV MEAN VELOCITY: 2.1 M/S
ECHO AV PEAK GRADIENT: 34 MMHG
ECHO AV PEAK VELOCITY: 2.9 M/S
ECHO AV VELOCITY RATIO: 0.55
ECHO AV VTI: 57.9 CM
ECHO LV FRACTIONAL SHORTENING: 21 % (ref 28–44)
ECHO LV INTERNAL DIMENSION DIASTOLE INDEX: 2.66 CM/M2
ECHO LV INTERNAL DIMENSION DIASTOLIC: 4.7 CM (ref 3.9–5.3)
ECHO LV INTERNAL DIMENSION SYSTOLIC INDEX: 2.09 CM/M2
ECHO LV INTERNAL DIMENSION SYSTOLIC: 3.7 CM
ECHO LV IVSD: 1.4 CM (ref 0.6–0.9)
ECHO LV MASS 2D: 224.8 G (ref 67–162)
ECHO LV MASS INDEX 2D: 127 G/M2 (ref 43–95)
ECHO LV POSTERIOR WALL DIASTOLIC: 1.1 CM (ref 0.6–0.9)
ECHO LV RELATIVE WALL THICKNESS RATIO: 0.47
ECHO LVOT AREA: 2.5 CM2
ECHO LVOT AV VTI INDEX: 0.53
ECHO LVOT DIAM: 1.8 CM
ECHO LVOT MEAN GRADIENT: 7 MMHG
ECHO LVOT PEAK GRADIENT: 10 MMHG
ECHO LVOT PEAK VELOCITY: 1.6 M/S
ECHO LVOT STROKE VOLUME INDEX: 43.8 ML/M2
ECHO LVOT SV: 77.6 ML
ECHO LVOT VTI: 30.5 CM
ECHO RV INTERNAL DIMENSION: 2.9 CM
ERYTHROCYTE [DISTWIDTH] IN BLOOD BY AUTOMATED COUNT: 14.1 % (ref 11.5–14.5)
GLOBULIN SER CALC-MCNC: 3.1 G/DL (ref 2–4)
GLUCOSE BLD STRIP.AUTO-MCNC: 111 MG/DL (ref 65–117)
GLUCOSE SERPL-MCNC: 119 MG/DL (ref 65–100)
HCT VFR BLD AUTO: 33.2 % (ref 35–47)
HGB BLD-MCNC: 10.1 G/DL (ref 11.5–16)
MAGNESIUM SERPL-MCNC: 2 MG/DL (ref 1.6–2.4)
MAGNESIUM SERPL-MCNC: 2.3 MG/DL (ref 1.6–2.4)
MCH RBC QN AUTO: 27.8 PG (ref 26–34)
MCHC RBC AUTO-ENTMCNC: 30.4 G/DL (ref 30–36.5)
MCV RBC AUTO: 91.5 FL (ref 80–99)
NRBC # BLD: 0 K/UL (ref 0–0.01)
NRBC BLD-RTO: 0 PER 100 WBC
PLATELET # BLD AUTO: 154 K/UL (ref 150–400)
PMV BLD AUTO: 11.3 FL (ref 8.9–12.9)
POTASSIUM SERPL-SCNC: 3 MMOL/L (ref 3.5–5.1)
POTASSIUM SERPL-SCNC: 3.1 MMOL/L (ref 3.5–5.1)
PROT SERPL-MCNC: 6.1 G/DL (ref 6.4–8.2)
RBC # BLD AUTO: 3.63 M/UL (ref 3.8–5.2)
SERVICE CMNT-IMP: NORMAL
SODIUM SERPL-SCNC: 136 MMOL/L (ref 136–145)
WBC # BLD AUTO: 9 K/UL (ref 3.6–11)

## 2023-02-13 PROCEDURE — 74011000250 HC RX REV CODE- 250: Performed by: THORACIC SURGERY (CARDIOTHORACIC VASCULAR SURGERY)

## 2023-02-13 PROCEDURE — 71046 X-RAY EXAM CHEST 2 VIEWS: CPT

## 2023-02-13 PROCEDURE — 74011250637 HC RX REV CODE- 250/637

## 2023-02-13 PROCEDURE — 97535 SELF CARE MNGMENT TRAINING: CPT

## 2023-02-13 PROCEDURE — 94760 N-INVAS EAR/PLS OXIMETRY 1: CPT

## 2023-02-13 PROCEDURE — 74011250637 HC RX REV CODE- 250/637: Performed by: PHYSICIAN ASSISTANT

## 2023-02-13 PROCEDURE — 84132 ASSAY OF SERUM POTASSIUM: CPT

## 2023-02-13 PROCEDURE — 65660000001 HC RM ICU INTERMED STEPDOWN

## 2023-02-13 PROCEDURE — 74011250636 HC RX REV CODE- 250/636: Performed by: THORACIC SURGERY (CARDIOTHORACIC VASCULAR SURGERY)

## 2023-02-13 PROCEDURE — 97116 GAIT TRAINING THERAPY: CPT

## 2023-02-13 PROCEDURE — 85027 COMPLETE CBC AUTOMATED: CPT

## 2023-02-13 PROCEDURE — C8923 2D TTE W OR W/O FOL W/CON,CO: HCPCS

## 2023-02-13 PROCEDURE — 36415 COLL VENOUS BLD VENIPUNCTURE: CPT

## 2023-02-13 PROCEDURE — 80053 COMPREHEN METABOLIC PANEL: CPT

## 2023-02-13 PROCEDURE — 83735 ASSAY OF MAGNESIUM: CPT

## 2023-02-13 PROCEDURE — 74011250636 HC RX REV CODE- 250/636

## 2023-02-13 PROCEDURE — 74011250636 HC RX REV CODE- 250/636: Performed by: NURSE PRACTITIONER

## 2023-02-13 PROCEDURE — 74011000250 HC RX REV CODE- 250: Performed by: NURSE PRACTITIONER

## 2023-02-13 PROCEDURE — 74011000250 HC RX REV CODE- 250

## 2023-02-13 PROCEDURE — 97110 THERAPEUTIC EXERCISES: CPT

## 2023-02-13 PROCEDURE — 74011250637 HC RX REV CODE- 250/637: Performed by: NURSE PRACTITIONER

## 2023-02-13 PROCEDURE — 82962 GLUCOSE BLOOD TEST: CPT

## 2023-02-13 RX ORDER — POTASSIUM CHLORIDE 750 MG/1
40 TABLET, FILM COATED, EXTENDED RELEASE ORAL
Status: COMPLETED | OUTPATIENT
Start: 2023-02-13 | End: 2023-02-13

## 2023-02-13 RX ORDER — BUMETANIDE 1 MG/1
2 TABLET ORAL ONCE
Status: COMPLETED | OUTPATIENT
Start: 2023-02-13 | End: 2023-02-13

## 2023-02-13 RX ORDER — MAGNESIUM SULFATE HEPTAHYDRATE 40 MG/ML
2 INJECTION, SOLUTION INTRAVENOUS ONCE
Status: COMPLETED | OUTPATIENT
Start: 2023-02-13 | End: 2023-02-13

## 2023-02-13 RX ORDER — POTASSIUM CHLORIDE 750 MG/1
40 TABLET, FILM COATED, EXTENDED RELEASE ORAL
Status: ACTIVE | OUTPATIENT
Start: 2023-02-13 | End: 2023-02-14

## 2023-02-13 RX ADMIN — SODIUM CHLORIDE, PRESERVATIVE FREE 10 ML: 5 INJECTION INTRAVENOUS at 15:04

## 2023-02-13 RX ADMIN — MONTELUKAST 10 MG: 10 TABLET, FILM COATED ORAL at 21:12

## 2023-02-13 RX ADMIN — MAGNESIUM OXIDE 400 MG (241.3 MG MAGNESIUM) TABLET 400 MG: TABLET at 08:40

## 2023-02-13 RX ADMIN — CARVEDILOL 6.25 MG: 6.25 TABLET, FILM COATED ORAL at 08:40

## 2023-02-13 RX ADMIN — CHLORHEXIDINE GLUCONATE 10 ML: 1.2 RINSE ORAL at 08:40

## 2023-02-13 RX ADMIN — CARVEDILOL 6.25 MG: 6.25 TABLET, FILM COATED ORAL at 17:34

## 2023-02-13 RX ADMIN — PERFLUTREN 1.5 ML: 6.52 INJECTION, SUSPENSION INTRAVENOUS at 16:00

## 2023-02-13 RX ADMIN — SODIUM CHLORIDE, PRESERVATIVE FREE 10 ML: 5 INJECTION INTRAVENOUS at 07:12

## 2023-02-13 RX ADMIN — POTASSIUM CHLORIDE 40 MEQ: 750 TABLET, FILM COATED, EXTENDED RELEASE ORAL at 08:39

## 2023-02-13 RX ADMIN — AMIODARONE HYDROCHLORIDE 400 MG: 200 TABLET ORAL at 17:34

## 2023-02-13 RX ADMIN — ACETAZOLAMIDE SODIUM 250 MG: 500 INJECTION, POWDER, LYOPHILIZED, FOR SOLUTION INTRAVENOUS at 00:38

## 2023-02-13 RX ADMIN — PANTOPRAZOLE SODIUM 40 MG: 40 TABLET, DELAYED RELEASE ORAL at 07:09

## 2023-02-13 RX ADMIN — OXYBUTYNIN CHLORIDE 10 MG: 5 TABLET, EXTENDED RELEASE ORAL at 21:46

## 2023-02-13 RX ADMIN — AMIODARONE HYDROCHLORIDE 400 MG: 200 TABLET ORAL at 08:39

## 2023-02-13 RX ADMIN — Medication 6 MG: at 21:46

## 2023-02-13 RX ADMIN — BUMETANIDE 2 MG: 1 TABLET ORAL at 11:29

## 2023-02-13 RX ADMIN — ASPIRIN 81 MG: 81 TABLET, CHEWABLE ORAL at 08:40

## 2023-02-13 RX ADMIN — ATORVASTATIN CALCIUM 40 MG: 40 TABLET, FILM COATED ORAL at 21:12

## 2023-02-13 RX ADMIN — POTASSIUM CHLORIDE 40 MEQ: 750 TABLET, FILM COATED, EXTENDED RELEASE ORAL at 21:11

## 2023-02-13 RX ADMIN — CHLORHEXIDINE GLUCONATE 10 ML: 1.2 RINSE ORAL at 17:37

## 2023-02-13 RX ADMIN — SODIUM CHLORIDE, PRESERVATIVE FREE 10 ML: 5 INJECTION INTRAVENOUS at 21:48

## 2023-02-13 RX ADMIN — MAGNESIUM SULFATE HEPTAHYDRATE 2 G: 40 INJECTION, SOLUTION INTRAVENOUS at 08:40

## 2023-02-13 RX ADMIN — ACETAMINOPHEN 1000 MG: 500 TABLET, FILM COATED ORAL at 07:09

## 2023-02-13 RX ADMIN — MAGNESIUM OXIDE 400 MG (241.3 MG MAGNESIUM) TABLET 400 MG: TABLET at 17:34

## 2023-02-13 NOTE — PROGRESS NOTES
Transitions of Care Plan  RUR: 11% - low  Clinical Update: s/p CABG  Consults: Therapy  Baseline: independent without DME; resides w   Barriers to Discharge: medical  Disposition:  Acute Rehab: [facility] - under review  Insurance Auth: BCBS - will start if accepted by [facility]  Estimated Discharge Date:  2+ days    CM reviewed chart. Patient's  provided choice for IPR to [facility]. Referral remains under review this afternoon. CM spoke with patient this afternoon and reviewed process for acute rehab including medical acceptance and insurance process. Patient not aware her spouse had chosen [facility] for acute rehab. Patient hopeful that she will progress towards home health but acknowledges that she may need acute rehab. Patient requests to speak with her family tonight about the situation and update CM. Updated CT Surgery NP of same.     Judit Jaquez, MPH  Care Manager [facility]  Available via [address] [place] [address] or

## 2023-02-13 NOTE — PROGRESS NOTES
0800: Report received from [facility]. Patient care assumed. 1530: ECHO at bedside. Pt. OOB x3    2000: Bedside and Verbal shift change report given to Argentina Ferrera RN (oncoming nurse) by Melissa Ren RN (offgoing nurse). Report included the following information SBAR, Kardex, OR Summary, Intake/Output, MAR, Recent Results, Med Rec Status, Cardiac Rhythm AV paced, and Alarm Parameters .

## 2023-02-13 NOTE — CARDIO/PULMONARY
Cardiac Rehab: Valve surgery education folder at the bedside. Met with Demetrice Johnson  and a family member to begin cardiac surgery post discharge instructions and to discuss participation in Cardiac Rehab Program.    Educated using teach back method. Reviewed use of bear for sternal support, daily weights and temperatures, , and use of incentive spirometer. Demetrice Johnson was able to demonstrate proper use of incentive spirometer, achieving 1000 ml. Discussed Cardiac Rehab Program benefits, format, and encouraged participation.  A referral for the OP CR program at [facility] was placed on the day of surgery and contact information is on her AVS. Demetrice Johnson verbalized understanding.     Benitez Dill RN

## 2023-02-13 NOTE — PROGRESS NOTES
Problem: Mobility Impaired (Adult and Pediatric)  Goal: *Acute Goals and Plan of Care (Insert Text)  Description: FUNCTIONAL STATUS PRIOR TO ADMISSION: Not able to obtain PLOF information, patient oriented x 1 and demonstrates perseveration during conversation. Will update as information becomes available. HOME SUPPORT PRIOR TO ADMISSION: Not able to obtain PLOF information, patient oriented x 1 and demonstrates perseveration during conversation. Will update as information becomes available. Physical Therapy Goals  Initiated 2/9/2023  1. Patient will move from supine to sit and sit to supine  in bed with maximal assistance within 5 days. 2.  Patient will perform sit to/from stand with maximal assistance within 5 days. 3.  Patient will ambulate 5 feet with least restrictive assistive device and maximal assistance within 5 days. 4.  Patient will ascend/descend 5 stairs with one handrail(s) with maximal assistance within 5 days. 5.  Patient will perform cardiac exercises per protocol with minimal assistance/contact guard assist within 5 days. 6.  Patient will verbally recall and functionally demonstrate mindful-based movements (\"move in the tube\") principles without cues within 5 days. Outcome: Progressing Towards Goal   PHYSICAL THERAPY TREATMENT  Patient: Candida Lan (80 y.o. female)  Date: 2/13/2023  Diagnosis: Aortic stenosis, severe [I35.0] <principal problem not specified>  Procedure(s) (LRB):  RIGHT FEMORAL CUTDOWN; RE-DO STERNOTOMY;  AORTIC VALVE REPLACEMENT (AVR) WITH 21MM INSPIRIS BIOPROSTHETIC VALVE; ASCENDING AORTIC ANEURYSM REPAIR WITH 28MM HEMASHIELD TUBE GRAFT; ECC; SHIVANI & EPIAORTIC US BY DR. ERNANDEZ [facility] (N/A) 6 Days Post-Op  Precautions: Fall, Sternal  Chart, physical therapy assessment, plan of care and goals were reviewed. ASSESSMENT  Patient continues with skilled PT services and is progressing towards goals. Patient found seated in chair and agreeable to PT.  Spo2 stable on RA at rest and stayed >97% throughout activity. Patient requires cues for adherence to move in the tube throughout session today. Patient demonstrating improved strength and attention to L side with transfers and exercises. Patient ambulated 30 feet x 2, with cues for standing rest and for PLB every 15 feet due to high RR up to 44; with PLB, patient recovers quickly to low 20s for RR. Patient required seated rest between bouts due to LE fatigue. Patient required up to min A x 1 for ambulating due to trunk sway, but demonstrated overall good ability to self correct small LOB. Patient stood and completed 6/6 cardiac UE exercises with CGA. Patient left seated in chair with all needs in reach. Patient is verbalizing and is not demonstrating understanding of mindful-based movements (\"move in the tube\") principles of keeping UEs proximal to ribcage to prevent lateral pull on the sternum during load-bearing activities with visual, verbal, and tactile cues required for compliance. Current Level of Function Impacting Discharge (mobility/balance): ambulates 30 feet x 2 with CGA-Min A x 1 on RA     Other factors to consider for discharge: 3 DONOVAN and not able to participate in stair training yet, cues for adherence to move in the tube, impaired balance, impaired activity tolerance         PLAN :  Patient continues to benefit from skilled intervention to address the above impairments. Continue treatment per established plan of care. to address goals. Recommendation for discharge: (in order for the patient to meet his/her long term goals)  Therapy 3 hours per day 5-7 days per week vs  PT pending continued progress towards goals    This discharge recommendation:  A follow-up discussion with the attending provider and/or case management is planned    IF patient discharges home will need the following DME: none       SUBJECTIVE:   Patient stated I feel better.     OBJECTIVE DATA SUMMARY:   Patient mobilized on continuous portable monitor/telemetry. Critical Behavior:  Neurologic State: Alert  Orientation Level: Oriented X4  Cognition: Follows commands  Safety/Judgement: Fall prevention, Home safety    Functional Mobility Training:  Bed Mobility:                      Transfers:  Sit to Stand: Contact guard assistance  Stand to Sit: Contact guard assistance                               Balance:  Sitting: Intact; Without support  Sitting - Static: Good (unsupported)  Sitting - Dynamic: Good (unsupported)  Standing: Impaired; Without support  Standing - Static: Good  Standing - Dynamic : Fair    Ambulation/Gait Training:  Distance (ft): 30 Feet (ft) (x2)  Assistive Device: Walker, rolling;Gait belt  Ambulation - Level of Assistance: Minimal assistance;Contact guard assistance;Assist x1        Gait Abnormalities: Decreased step clearance; Altered arm swing;Trunk sway increased        Base of Support: Narrowed     Speed/Lilo: Pace decreased (<100 feet/min)  Step Length: Right shortened;Left shortened                   Cardiac diagnosis intervention:  Patient instructed and educated on mindful movement principles based on Move in The Tube concept to include maintaining bilateral elbows close to rib cage when performing any load-bearing activity such as getting in/out of bed, pushing up from a chair, opening a door, or lifting a box. Patient was given a handout with diagrams of each correct/incorrect method of performing each of the above tasks. Therapeutic Exercises:   Patient instructed on the benefits and demonstrated cardiac exercises while standing with Contact guard assistance. Instructed and indicated understanding on how to progress reps, sets against gravity, pacing through progressive muscle strengthening standing based on surgeon clearance for more weight in prep for functional activity. Instruction on the use of household items in place of weights as needed.      CARDIAC  EXERCISE   Sets   Reps   Active Active Assist   Passive Self ROM   Comments   Shoulder flexion 1 5 [x]                                            []                                            []                                            []                                               Shoulder abduction 1      5 [x]                                            []                                            []                                            []                                               Scapular elevation 1 5 [x]                                            []                                            []                                            []                                               Scapular retraction 1 5 [x]                                            []                                            []                                            []                                               Trunk rotation 1 5 [x]                                            []                                            []                                            []                                               Trunk sidebending 1 5 [x]                                            []                                            []                                            []                                                   Pain Ratin/10    Activity Tolerance:   Good, SpO2 stable on RA, requires frequent rest breaks, and observed SOB with activity    After treatment patient left in no apparent distress:   Sitting in chair and Call bell within reach    COMMUNICATION/COLLABORATION:   The patients plan of care was discussed with: Occupational therapist and Registered nurse.      Cam Mendenhall, PT   Time Calculation: 32 mins

## 2023-02-13 NOTE — PROGRESS NOTES
SLP Contact Note    Noted pt advanced to regular diet/thin liquids over the weekend by nursing. Discussed with RN Gavi Styles who states pt tolerating without issue. Therefore, recommend continuing diet. Follow up for cognition will be appropriate at next level of care.       Thank you,  ROMELIA MonrealEd, [id number] [facility]  Speech-Language Pathologist

## 2023-02-13 NOTE — PROGRESS NOTES
[facility] ICU Progress Note    Admit Date: 2023  POD:  6 Day Post-Op    23    Procedure:  Procedure(s):  RIGHT FEMORAL CUTDOWN; RE-DO STERNOTOMY;  AORTIC VALVE REPLACEMENT (AVR) WITH 21MM INSPIRIS BIOPROSTHETIC VALVE; ASCENDING AORTIC ANEURYSM REPAIR WITH 28MM HEMASHIELD TUBE GRAFT; ECC; SHIVANI & EPIAORTIC US BY DR. ERNANDEZ [facility]       SHIVANI 23 DURING SURGERY  Indications: assessment of ascending aorta and assessment of surgical repair  Modalities: 2D, CF, CWD, contrast, PWD  Probe Type: multiplane  Insertion: atraumatic  Patient Status: intubated    Complications: None  Comments: Pre SHIVANI  SHIVANI views are distorted as the hear is rotated. LV 35-40%, LVH , septal and inf walls down. RV normal.  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 Post SHIVANI  LV fn preserved, ef 50%. RV normal  Bioprosthetic AV in situ, no AI, NO PVL, asc aorta repaired by tube graft. Mean gradient 6 mm across the AV. MR 2+,   LV fn came down to pre op level of ef 35%, same wall motion problems as before. Subjective/24hr events:   Pt seen with Dr. Veena Robert. On RA, afebrile, without complaints. Sitting up in her chair eating breakfast. Awaiting echo. HIT negative    Paced at 80 via PPM   Objective:   Pt synopsis:   POD1: Low CI. Increased dobutamine. Weaned off epi later in the day.  POD2: 1 unit platelets. Speech eval. PM increased 80, dobutamine weaned off.  2/10 POD3: Deline. Head CT. Cont to work with speech.  POD4: CT drainage continues high, continues net fluid positive, change diuresis to bumex   POD5: contraction alkalosis, change diuretic to diamox for 3 doses   POD6: PT/OT. Cont diuresis    Vitals:  Blood pressure 129/71, pulse 80, temperature 98.4 °F (36.9 °C), resp. rate 19, height 5' (1.524 m), weight 176 lb 4.8 oz (80 kg), SpO2 94 %.   Temp (24hrs), Av.5 °F (36.9 °C), Min:98.3 °F (36.8 °C), Max:98.8 °F (37.1 °C)      EKG/Rhythm:  AV paced with PPM @ 80    Extubation Date / Time: extubated 2/8/23 1646    CT Output: CT out 2/12    Oxygen Therapy: RA    CXR: awaiting pa/lat    CXR Results  (Last 48 hours)                 02/12/23 0436  XR CHEST PORT Final result    Impression:  No pneumothorax following removal of right IJ sheath. Left basilar atelectasis. Narrative:  INDICATION: post heart, fluid overload       EXAMINATION:  AP CHEST, PORTABLE       COMPARISON: 2/11/2023       FINDINGS: Single AP portable view of the chest demonstrates removal of right IJ   sheath. Mediastinal and pleural drains are unchanged. Left subclavian pacemaker   is unchanged. The cardiomediastinal silhouette is unchanged. Left basilar   atelectasis. No pulmonary edema or pneumothorax. Admission Weight: Last Weight   Weight: 170 lb 3.1 oz (77.2 kg) Weight: 176 lb 4.8 oz (80 kg)     Intake / Output / Drain:  Current Shift: No intake/output data recorded.   Last 24 hrs.:   Intake/Output Summary (Last 24 hours) at 2/13/2023 0913  Last data filed at 2/13/2023 0700  Gross per 24 hour   Intake 680 ml   Output 2680 ml   Net -2000 ml       EXAM:  General: oriented and following commands, up in chair, smiling, appropriate  Lung: CTA apical, diminished bibasilar,fair effort  Incision: dry and intact, no drainage, sternum stable, left groin incision no erythema, hematoma or drainage, ecchymosis present  Heart: s1s2, reg rate and rhy, paced with ppm  Abdomen: BS present, soft, non-tender, no bruits. + BM  Extremities: warm, well perfused, +2 DP bilaterally  Neurologic: alert and oriented to person, place, time and situation, normal thought processes, motor/sensory intact to right upper and bilateral lower extremities, strength in upper extremities equal    Labs:   Recent Labs     02/13/23  0716 02/13/23 0433   WBC  --  9.0   HGB  --  10.1*   HCT  --  33.2*   PLT  --  154   NA  --  136   K  --  3.0*   BUN  --  14   CREA  --  0.45*   GLU  --  119*   GLUCPOC 111 --         Assessment:     Active Problems: Aortic stenosis, severe (2/7/2023)      S/P aortic valve repair (2/7/2023)      Overview: RIGHT FEMORAL CUTDOWN; RIGHT FEMORAL ARTERIAL CANNULATION,  RE-DO       STERNOTOMY;  HYPOTHERMIC ARREST, ANTEGRADE CEREBRAL PERFUSION VIA       INNOMINATE ARTERY, REDO AORTIC VALVE REPLACEMENT (AVR) WITH 21MM INSPIRIS       BIOPROSTHETIC VALVE; ASCENDING AORTIC ANEURYSM REPAIR WITH 28MM HEMASHIELD       TUBE GRAFT; REPAIR AORTIC TEAR, PULMONARY ARTERY REPAIR, RIGHT ATRIAL       REPAIR, ECC; SHIVANI & EPIAORTIC US BY DR. ERNANDEZ [facility]       Plan/Recommendations/Medical Decision Making:     Ascending aortic aneurysm, end of life bioprosthetic aortic valve s/p redo sternotomy, redo aortic valve replacement, repair ascending aortic aneurysm with tube graft, left femoral cutdown and arterial cannulation on 2/7/23.   - Cont ASA   - SBP goal < 140  - cont coreg     Cardiogenic shock s/p redo sternotomy, redo AVR. PPM increased to rate of 80. Now off drips. Resolved. Acute respiratory failure with hypoxemia s/p redo sternotomy, redo AVR, room air while awake, 1-2L NC when sleeping, fair effort. Contraction alkalosis on 2/12, received 3 doses diamox.  - Pulm toilet: acapella, IS, cough, deep breathe, ambulate   - bumex 2 mg PO once today    Post operative delirium s/p hypothermic arrest - Resolved mentation at baseline  - strict day/night schedule  - avoid benzos and narcs    Left arm weakness unknown etiology, continues to improved. Now equal. Concern for TIA prior to surgery: Patient seen in ED 1 week ago, all testing discussed. Per Neurology is safe to proceed with surgery, they favor it possibly being amaurosis fugax. Head CT 2/10, no acute processes. Post operative coagulopathy secondary to prolonged cardiopulmonary bypass, currently stable, received multiple blood products to stabilize. Resolved. Acute blood loss anemia, stable, monitor daily.  Above transfusion threshold. Thrombocytopenia: resolved, HIT panel negative    CHFrEF: EF 45% on 2/1/23, Non-preserved EF heart failure with documented EF 35-45% via SHIVANI in surgery. GDMT as follows:  - Cont Coreg  - Losartan PTA, no ready for this in periop period  - spironolactone outpatient  - may add jardiance closer to discharge  - transition to PO bumex 2 mg today    Hx hypertension, PTA losartan 50mg daily - last dose 2/4    Hx hyperlipidemia  - cont PTA atorvastatin 40mg daily    Hx complete heart block r/t post op AVR: [employer] pacemaker implanted in 2014 (with lead revision) patient states has 8 months of battery left on pacemaker. PPM interrogated and functioning properly, rate increased to 80. Defer to cardiology for settings change. Asthma: No inhalers noted, hx smoker. GERD:   - cont PTA omeprazole 40mg daily    Post operative debility secondary to open heart surgery: PT/OT/cardiac rehab, may need placement at discharge, anticipate d/c tomorrow. Disposition: intermediate status, no bed yet. Needs echo and pa/lat prior to discharge. Likely ready for discharge tomorrow.     Signed By: Enzo Smith NP

## 2023-02-13 NOTE — PROGRESS NOTES
Problem: Self Care Deficits Care Plan (Adult)  Goal: *Acute Goals and Plan of Care (Insert Text)  Description: FUNCTIONAL STATUS PRIOR TO ADMISSION: Pt unable to provide PLOF at time of evaluation d/t impaired cognition. Per chart review pt is independent and active without use of DME.      HOME SUPPORT: The patient lived with her  Juana Abbasi) but did not require assist with ADL tasks. Per chart, pt is still working in administration. Occupational Therapy Goals  Initiated 2/9/2023  1. Patient will perform basic grooming routine in supported sitting with minimal assistance within 7 day(s). 2.  Patient will perform anterior neck to thigh bathing with moderate assistance within 7 day(s). 3.  Patient will perform upper body dressing with moderate assistance within 7 day(s). 4.  Patient will perform lateral rolling in prep for completion of toileting routine with moderate assistance within 7 day(s). 5.  Patient will participate in upper extremity therapeutic exercise/activities with supervision and visual/verbal cues for 5 minutes within 7 day(s). Outcome: Progressing Towards Goal   OCCUPATIONAL THERAPY TREATMENT  Patient: Niels Rodrigues (17 y.o. female)  Date: 2/13/2023  Diagnosis: Aortic stenosis, severe [I35.0] <principal problem not specified>  Procedure(s) (LRB):  RIGHT FEMORAL CUTDOWN; RE-DO STERNOTOMY;  AORTIC VALVE REPLACEMENT (AVR) WITH 21MM INSPIRIS BIOPROSTHETIC VALVE; ASCENDING AORTIC ANEURYSM REPAIR WITH 28MM HEMASHIELD TUBE GRAFT; ECC; SHIVANI & EPIAORTIC US BY DR. ERNANDEZ [facility] (N/A) 6 Days Post-Op  Precautions: Fall, Sternal  Chart, occupational therapy assessment, plan of care, and goals were reviewed. ASSESSMENT  Patient continues with skilled OT services and is progressing towards goals. Pt's performance of ADL/IADL tasks continues to be limited at this time by impaired balance, activity tolerance, generalized weakness, and expected post-operative pain.  Pt received seated on BSC and requesting assistance with hygiene. Ultimately pt required max A for completion of bowel hygiene d/t impaired LB access/reach. After functional mobility and multiple seated rest breaks, pt completed 6/6 standing cardiac exercises with CGA to maintain balance. Pt repositioned for comfort and left with all needs met. Continue to recommend d/c to rehab for ongoing therapy efforts. Patient is not verbalizing and is not demonstrating understanding of mindful-based movements (\"move in the tube\") principles of keeping UEs proximal to ribcage to prevent lateral pull on the sternum during load-bearing activities with visual and verbal cues required for compliance. Current Level of Function Impacting Discharge (ADLs): CGA functional transfers in prep for ADLs, max A toileting routine/hygiene     Other factors to consider for discharge: PLOF         PLAN :  Patient continues to benefit from skilled intervention to address the above impairments. Continue treatment per established plan of care to address goals. Recommendation for discharge: (in order for the patient to meet his/her long term goals)  Therapy 3 hours per day 5-7 days per week    This discharge recommendation:  Has been made in collaboration with the attending provider and/or case management    IF patient discharges home will need the following DME: TBD       SUBJECTIVE:   Patient stated Are you going to teach my  how to walk with me?    OBJECTIVE DATA SUMMARY:   Cognitive/Behavioral Status:  Neurologic State: Alert  Orientation Level: Oriented X4  Cognition: Follows commands  Perception: Appears intact  Perseveration: No perseveration noted  Safety/Judgement: Awareness of environment    Functional Mobility and Transfers for ADLs:  Bed Mobility:       Transfers:  Sit to Stand: Contact guard assistance  Functional Transfers  Toilet Transfer : Contact guard assistance (to [facility])       Balance:  Sitting: Intact; Without support  Sitting - Static: Good (unsupported)  Sitting - Dynamic: Good (unsupported)  Standing: Impaired; Without support  Standing - Static: Good  Standing - Dynamic : Fair    ADL Intervention:              Toileting  Toileting Assistance: Maximum assistance (on [facility])  Bladder Hygiene: Contact guard assistance  Bowel Hygiene: Maximum assistance  Clothing Management: Maximum assistance  Cues: Verbal cues provided    Cognitive Retraining  Safety/Judgement: Awareness of environment    Patient instructed and educated on mindful movement principles based on Move in The Tube concept to include maintaining bilateral elbows close to rib cage when performing any load-bearing activity such as getting in/out of bed, pushing up from a chair, opening a door, or lifting a box. Patient was given a handout with diagrams of each correct/incorrect method of performing each of the above tasks. Patient instructed on the ability to utilize upper extremities outside the tube when doing any non-load bearing activity such as washing hair/body, brushing teeth, retrieving clothing items, or scratching your back. Patient encouraged to also perform upper extremity exercises \"outside of the tube\" to prevent scar tissue formation around sternal incision site. Patient instructed in detail about activities to heed with caution, allowing pain to be the guide. These activities include but are not limited to: mowing the lawn, riding a bike, walking a dog, lifting a child, workshop hobbies, golfing, sexual activity, vacuuming, fishing, scrubbing the floors, and moving furniture. Patient was given the [address] in the [place] handout to describe each of these activities in detail. Patient instructed no asymmetrical reaching over head to ensure B UEs when shoulders >90* i.e. reaching in cabinets and dressing. Instruction on upper body dressing techniques of over head, then arms through to decrease pain and unilateral shoulder flexion >90*.  Instruction on the benefits of utilizing B UEs during functional tasks i.e. opening the fridge, stepping into the tub. Instruction if continued pain at home with shoulder IR for BM hygiene can use wet wipes and toilet tongs PRN. Avoid valsalva maneuvers. Increase activity tolerance for home, work, and sexual intercourse by pacing self with increasing the arm exercises, sitting duration, frequency OOB, walking, standing, and ADLs. Instructed and indicated understanding of s/s of too much activity, how to respond to s/s safely. Therapeutic Exercises:   Patient instructed on the benefits and demonstrated cardiac exercises while standing with Contact guard assistance. Instructed and indicated understanding on how to progress reps, sets against gravity, pacing through progressive muscle strengthening standing based on surgeon clearance for more weight in prep for basic and instrumental ADLs. Instruction on the use of household items in place of weights as needed.     CARDIAC   EXERCISE    Sets    Reps    Active  Active Assist    Passive  Self ROM    Comments    Shoulder flexion  1  5   [x]                            []                             []                             []                                Shoulder abduction  1  5  [x]                             []                             []                             []                                Scapular elevation  1  5  [x]                             []                              []                             []                                Scapular retraction  1  5  [x]                             []                             []                             []                                Trunk rotation  1  5  [x]                             []                             []                             []                                Trunk sidebending  1  5  [x]                             []                              []                             [] Pain:  None reported     Activity Tolerance:   Good/fair, requires seated rest breaks     After treatment patient left in no apparent distress:   Sitting in chair and Call bell within reach    COMMUNICATION/COLLABORATION:   The patients plan of care was discussed with: Physical therapist and Registered nurse.      PARIS Pozo, OTR/L  Time Calculation: 33 mins

## 2023-02-14 LAB
ABO + RH BLD: NORMAL
ABO + RH BLD: NORMAL
ALBUMIN SERPL-MCNC: 2.8 G/DL (ref 3.5–5)
ALBUMIN/GLOB SERPL: 0.8 (ref 1.1–2.2)
ALP SERPL-CCNC: 56 U/L (ref 45–117)
ALT SERPL-CCNC: 16 U/L (ref 12–78)
ANION GAP SERPL CALC-SCNC: 5 MMOL/L (ref 5–15)
AST SERPL-CCNC: 11 U/L (ref 15–37)
BILIRUB SERPL-MCNC: 0.8 MG/DL (ref 0.2–1)
BLOOD GROUP ANTIBODIES SERPL: NORMAL
BLOOD GROUP ANTIBODIES SERPL: NORMAL
BUN SERPL-MCNC: 13 MG/DL (ref 6–20)
BUN/CREAT SERPL: 33 (ref 12–20)
CALCIUM SERPL-MCNC: 8.6 MG/DL (ref 8.5–10.1)
CHLORIDE SERPL-SCNC: 107 MMOL/L (ref 97–108)
CO2 SERPL-SCNC: 24 MMOL/L (ref 21–32)
CREAT SERPL-MCNC: 0.4 MG/DL (ref 0.55–1.02)
ERYTHROCYTE [DISTWIDTH] IN BLOOD BY AUTOMATED COUNT: 14.5 % (ref 11.5–14.5)
GLOBULIN SER CALC-MCNC: 3.4 G/DL (ref 2–4)
GLUCOSE SERPL-MCNC: 112 MG/DL (ref 65–100)
HCT VFR BLD AUTO: 32.5 % (ref 35–47)
HGB BLD-MCNC: 10 G/DL (ref 11.5–16)
MAGNESIUM SERPL-MCNC: 2 MG/DL (ref 1.6–2.4)
MCH RBC QN AUTO: 27.6 PG (ref 26–34)
MCHC RBC AUTO-ENTMCNC: 30.8 G/DL (ref 30–36.5)
MCV RBC AUTO: 89.8 FL (ref 80–99)
NRBC # BLD: 0 K/UL (ref 0–0.01)
NRBC BLD-RTO: 0 PER 100 WBC
PLATELET # BLD AUTO: 228 K/UL (ref 150–400)
PMV BLD AUTO: 11.4 FL (ref 8.9–12.9)
POTASSIUM SERPL-SCNC: 3.6 MMOL/L (ref 3.5–5.1)
PROT SERPL-MCNC: 6.2 G/DL (ref 6.4–8.2)
RBC # BLD AUTO: 3.62 M/UL (ref 3.8–5.2)
SODIUM SERPL-SCNC: 136 MMOL/L (ref 136–145)
SPECIMEN EXP DATE BLD: NORMAL
SPECIMEN EXP DATE BLD: NORMAL
WBC # BLD AUTO: 10.4 K/UL (ref 3.6–11)

## 2023-02-14 PROCEDURE — 97116 GAIT TRAINING THERAPY: CPT

## 2023-02-14 PROCEDURE — 65660000001 HC RM ICU INTERMED STEPDOWN

## 2023-02-14 PROCEDURE — 74011250637 HC RX REV CODE- 250/637

## 2023-02-14 PROCEDURE — 83735 ASSAY OF MAGNESIUM: CPT

## 2023-02-14 PROCEDURE — 86900 BLOOD TYPING SEROLOGIC ABO: CPT

## 2023-02-14 PROCEDURE — 97530 THERAPEUTIC ACTIVITIES: CPT

## 2023-02-14 PROCEDURE — 36415 COLL VENOUS BLD VENIPUNCTURE: CPT

## 2023-02-14 PROCEDURE — 74011000250 HC RX REV CODE- 250

## 2023-02-14 PROCEDURE — 85027 COMPLETE CBC AUTOMATED: CPT

## 2023-02-14 PROCEDURE — 80053 COMPREHEN METABOLIC PANEL: CPT

## 2023-02-14 PROCEDURE — 74011000250 HC RX REV CODE- 250: Performed by: NURSE PRACTITIONER

## 2023-02-14 PROCEDURE — 74011250637 HC RX REV CODE- 250/637: Performed by: NURSE PRACTITIONER

## 2023-02-14 PROCEDURE — 97535 SELF CARE MNGMENT TRAINING: CPT

## 2023-02-14 RX ORDER — POTASSIUM CHLORIDE 750 MG/1
40 TABLET, FILM COATED, EXTENDED RELEASE ORAL
Status: COMPLETED | OUTPATIENT
Start: 2023-02-14 | End: 2023-02-14

## 2023-02-14 RX ORDER — LOSARTAN POTASSIUM 25 MG/1
25 TABLET ORAL DAILY
Status: DISCONTINUED | OUTPATIENT
Start: 2023-02-14 | End: 2023-02-15

## 2023-02-14 RX ADMIN — SODIUM CHLORIDE, PRESERVATIVE FREE 10 ML: 5 INJECTION INTRAVENOUS at 06:55

## 2023-02-14 RX ADMIN — SODIUM CHLORIDE, PRESERVATIVE FREE 10 ML: 5 INJECTION INTRAVENOUS at 21:06

## 2023-02-14 RX ADMIN — CARVEDILOL 6.25 MG: 6.25 TABLET, FILM COATED ORAL at 10:09

## 2023-02-14 RX ADMIN — PANTOPRAZOLE SODIUM 40 MG: 40 TABLET, DELAYED RELEASE ORAL at 06:54

## 2023-02-14 RX ADMIN — CHLORHEXIDINE GLUCONATE 10 ML: 1.2 RINSE ORAL at 21:04

## 2023-02-14 RX ADMIN — OXYBUTYNIN CHLORIDE 10 MG: 5 TABLET, EXTENDED RELEASE ORAL at 21:03

## 2023-02-14 RX ADMIN — CARVEDILOL 6.25 MG: 6.25 TABLET, FILM COATED ORAL at 18:56

## 2023-02-14 RX ADMIN — ATORVASTATIN CALCIUM 40 MG: 40 TABLET, FILM COATED ORAL at 21:03

## 2023-02-14 RX ADMIN — SODIUM CHLORIDE, PRESERVATIVE FREE 10 ML: 5 INJECTION INTRAVENOUS at 21:07

## 2023-02-14 RX ADMIN — AMIODARONE HYDROCHLORIDE 400 MG: 200 TABLET ORAL at 10:09

## 2023-02-14 RX ADMIN — ASPIRIN 81 MG: 81 TABLET, CHEWABLE ORAL at 10:09

## 2023-02-14 RX ADMIN — LOSARTAN POTASSIUM 25 MG: 25 TABLET, FILM COATED ORAL at 10:10

## 2023-02-14 RX ADMIN — Medication 6 MG: at 21:03

## 2023-02-14 RX ADMIN — MAGNESIUM OXIDE 400 MG (241.3 MG MAGNESIUM) TABLET 400 MG: TABLET at 10:08

## 2023-02-14 RX ADMIN — POTASSIUM CHLORIDE 40 MEQ: 750 TABLET, FILM COATED, EXTENDED RELEASE ORAL at 10:09

## 2023-02-14 RX ADMIN — AMIODARONE HYDROCHLORIDE 400 MG: 200 TABLET ORAL at 18:56

## 2023-02-14 RX ADMIN — MONTELUKAST 10 MG: 10 TABLET, FILM COATED ORAL at 21:06

## 2023-02-14 RX ADMIN — MAGNESIUM OXIDE 400 MG (241.3 MG MAGNESIUM) TABLET 400 MG: TABLET at 18:56

## 2023-02-14 RX ADMIN — CHLORHEXIDINE GLUCONATE 10 ML: 1.2 RINSE ORAL at 10:13

## 2023-02-14 NOTE — PROGRESS NOTES
\[facility] [place] [facility]\"" ICU Progress Note    Admit Date: 2/7/2023  POD:  7 Day Post-Op    02/14/23    Procedure:  Procedure(s):  RIGHT FEMORAL CUTDOWN; RE-DO STERNOTOMY;  AORTIC VALVE REPLACEMENT (AVR) WITH 21MM INSPIRIS BIOPROSTHETIC VALVE; ASCENDING AORTIC ANEURYSM REPAIR WITH 28MM HEMASHIELD TUBE GRAFT; ECC; SHIVANI & EPIAORTIC US BY DR. ERNANDEZ [facility]       SHIVANI 2/7/23 DURING SURGERY  Indications: assessment of ascending aorta and assessment of surgical repair  Modalities: 2D, CF, CWD, contrast, PWD  Probe Type: multiplane  Insertion: atraumatic  Patient Status: intubated    Complications: None  Comments: Pre SHIVANI  SHIVANI views are distorted as the hear is rotated. LV 35-40%, LVH , septal and inf walls down. RV normal.  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 Post SHIVANI  LV fn preserved, ef 50%. RV normal  Bioprosthetic AV in situ, no AI, NO PVL, asc aorta repaired by tube graft. Mean gradient 6 mm across the AV. MR 2+,   LV fn came down to pre op level of ef 35%, same wall motion problems as before. Subjective/24hr events:   Pt seen with Dr. Marissa Barker. On RA, afebrile, AV paced @ 80. CC is dry cough. Sitting up in her chair eating breakfast.      Objective:   Pt synopsis:  2/8 POD1: Low CI. Increased dobutamine. Weaned off epi later in the day. 2/9 POD2: 1 unit platelets. Speech eval. PM increased 80, dobutamine weaned off.  2/10 POD3: Deline. Head CT. Cont to work with speech. 2/11 POD4: CT drainage continues high, continues net fluid positive, change diuresis to bumex  2/12 POD5: contraction alkalosis, change diuretic to diamox for 3 doses  2/13 POD6: PT/OT. Cont diuresis. Follow- up echo, pa/lat. 2/14 POD7: Medically ready to leave hospital. Awaiting IPR [facility]. She may out-qualify IPR while waiting on auth. Vitals:  Blood pressure (!) 141/70, pulse 80, temperature 98.3 °F (36.8 °C), resp. rate 19, height 5' (1.524 m), weight 175 lb 7.8 oz (79.6 kg), SpO2 92 %.   Temp (24hrs), Av.5 °F (36.9 °C), Min:98.3 °F (36.8 °C), Max:98.8 °F (37.1 °C)      EKG/Rhythm:  AV paced with PPM @ 80    Extubation Date / Time: extubated 23 1646    CT Output: CT out     Oxygen Therapy: RA    CXR: awaiting pa/lat    CXR Results  (Last 48 hours)                 23 2130  XR CHEST PA LAT Final result    Impression:  No pneumothorax post removal of chest tubes. Trace effusions and   mild interstitial pulmonary edema. Narrative:  EXAM:  XR CHEST PA LAT       INDICATION:  post CT pull, postop AVR       COMPARISON: 2023. TECHNIQUE: PA and lateral chest radiographs. FINDINGS: Interval removal of bilateral chest tubes. Stable cardiac and   mediastinal contours. Median sternotomy and aortic valvular replacement. Mild   interstitial pulmonary edema, not significantly changed. No focal   consolidations. Trace bilateral pleural effusions. No pneumothorax. Admission Weight: Last Weight   Weight: 170 lb 3.1 oz (77.2 kg) Weight: 175 lb 7.8 oz (79.6 kg)     Intake / Output / Drain:  Current Shift: No intake/output data recorded.   Last 24 hrs.:   Intake/Output Summary (Last 24 hours) at 2023 9082  Last data filed at 2023 3140  Gross per 24 hour   Intake 530 ml   Output 1850 ml   Net -1320 ml       EXAM:  General: oriented and following commands, up in chair, smiling, appropriate  Lung: CTA apical, diminished bibasilar,fair effort  Incision: dry and intact, no drainage, sternum stable, left groin incision no erythema, hematoma or drainage, ecchymosis present  Heart: s1s2, reg rate and rhy, paced with ppm  Abdomen: BS present, soft, non-tender, no bruits. + BM  Extremities: warm, well perfused, +2 DP bilaterally  Neurologic: alert and oriented to person, place, time and situation, normal thought processes, motor/sensory intact to right upper and bilateral lower extremities, strength in upper extremities equal    Labs:   Recent Labs     23  0500 02/13/23  1844 02/13/23  0716   WBC 10.4  --   --    HGB 10.0*  --   --    HCT 32.5*  --   --      --   --      --   --    K 3.6   < >  --    BUN 13  --   --    CREA 0.40*  --   --    *  --   --    GLUCPOC  --   --  111    < > = values in this interval not displayed. Assessment:     Active Problems: Aortic stenosis, severe (2/7/2023)      S/P aortic valve repair (2/7/2023)      Overview: RIGHT FEMORAL CUTDOWN; RIGHT FEMORAL ARTERIAL CANNULATION,  RE-DO       STERNOTOMY;  HYPOTHERMIC ARREST, ANTEGRADE CEREBRAL PERFUSION VIA       INNOMINATE ARTERY, REDO AORTIC VALVE REPLACEMENT (AVR) WITH 21MM INSPIRIS       BIOPROSTHETIC VALVE; ASCENDING AORTIC ANEURYSM REPAIR WITH 28MM HEMASHIELD       TUBE GRAFT; REPAIR AORTIC TEAR, PULMONARY ARTERY REPAIR, RIGHT ATRIAL       REPAIR, ECC; SHIVANI & EPIAORTIC US BY DR. ERNANDEZ [facility]       Plan/Recommendations/Medical Decision Making:     Ascending aortic aneurysm, end of life bioprosthetic aortic valve s/p redo sternotomy, redo aortic valve replacement, repair ascending aortic aneurysm with tube graft, left femoral cutdown and arterial cannulation on 2/7/23.   - Cont ASA   - SBP goal < 140 (added PTA losartan at lower dose this am)  - cont coreg     Cardiogenic shock s/p redo sternotomy, redo AVR. PPM increased to rate of 80. Now off drips. Resolved. Acute respiratory failure with hypoxemia s/p redo sternotomy, redo AVR, resolved. Now on room air .  - Pulm toilet: acapella, IS, cough, deep breathe, ambulate     Post operative delirium s/p hypothermic arrest - Resolved mentation at baseline  - strict day/night schedule  - avoid benzos and narcs    Left arm weakness unknown etiology, resolved. Concern for TIA prior to surgery: Patient seen in ED 2 weeks ago, all testing discussed. Per Neurology is safe to proceed with surgery, they favor it possibly being amaurosis fugax. Head CT 2/10, no acute processes.  Reached out to Dr. Beckham Holding, she does not need neurology follow up outpatient. Post operative coagulopathy secondary to prolonged cardiopulmonary bypass, currently stable, received multiple blood products to stabilize. Resolved. Acute blood loss anemia, stable, monitor daily. Above transfusion threshold. Thrombocytopenia: resolved, HIT panel negative    CHFrEF: EF 45% on 2/1/23, Non-preserved EF heart failure with documented EF 35-45% via SHIVANI in surgery. GDMT as follows:  - Cont Coreg  - Losartan 25 mg daily  - spironolactone outpatient  - may add jardiance closer to discharge  - euvolemic on exam, no diuretic today    Hx hypertension, PTA losartan 50mg daily - last dose 2/4    Hx hyperlipidemia  - cont PTA atorvastatin 40mg daily    Hx complete heart block r/t post op AVR: [employer] pacemaker implanted in 2014 (with lead revision) patient states has 8 months of battery left on pacemaker. PPM interrogated and functioning properly, rate increased to 80. Defer to cardiology for settings change. Asthma: No inhalers noted, hx smoker. GERD:   - cont PTA omeprazole 40mg daily    Post operative debility secondary to open heart surgery: PT/OT/cardiac rehab. PT recommending IPR right now but she may out-qualify this while waiting on auth in which case she will go home with home health in a day or two    Disposition: intermediate status, no bed yet. Can shower when she gets to the floor. Medically ready for discharge, awaiting placement.     Signed By: Gauri Monzon NP

## 2023-02-14 NOTE — PROGRESS NOTES
Problem: Mobility Impaired (Adult and Pediatric)  Goal: *Acute Goals and Plan of Care (Insert Text)  Description: FUNCTIONAL STATUS PRIOR TO ADMISSION: Not able to obtain PLOF information, patient oriented x 1 and demonstrates perseveration during conversation. Will update as information becomes available. HOME SUPPORT PRIOR TO ADMISSION: Not able to obtain PLOF information, patient oriented x 1 and demonstrates perseveration during conversation. Will update as information becomes available. Physical Therapy Goals  Updated 2/14/2023  1. Patient will move from supine to sit and sit to supine  in bed with supervision within 5 days. 2.  Patient will perform sit to/from stand with supervision within 5 days. 3.  Patient will ambulate 100 feet with least restrictive assistive device and supervision within 5 days. 4.  Patient will ascend/descend 5 stairs with one handrail(s) with minimal assistance within 5 days. 5.  Patient will perform cardiac exercises per protocol with supervision within 5 days. 6.  Patient will verbally recall and functionally demonstrate mindful-based movements (\"move in the tube\") principles without cues within 5 days. Physical Therapy Goals  Initiated 2/9/2023  1. Patient will move from supine to sit and sit to supine  in bed with maximal assistance within 5 days. 2.  Patient will perform sit to/from stand with maximal assistance within 5 days. 3.  Patient will ambulate 5 feet with least restrictive assistive device and maximal assistance within 5 days. 4.  Patient will ascend/descend 5 stairs with one handrail(s) with maximal assistance within 5 days. 5.  Patient will perform cardiac exercises per protocol with minimal assistance/contact guard assist within 5 days. 6.  Patient will verbally recall and functionally demonstrate mindful-based movements (\"move in the tube\") principles without cues within 5 days.                 Outcome: Progressing Towards Goal   PHYSICAL THERAPY TREATMENT: WEEKLY REASSESSMENT  Patient: Katie Laird (39 y.o. female)  Date: 2/14/2023  Diagnosis: Aortic stenosis, severe [I35.0] <principal problem not specified>  Procedure(s) (LRB):  RIGHT FEMORAL CUTDOWN; RE-DO STERNOTOMY;  AORTIC VALVE REPLACEMENT (AVR) WITH 21MM INSPIRIS BIOPROSTHETIC VALVE; ASCENDING AORTIC ANEURYSM REPAIR WITH 28MM HEMASHIELD TUBE GRAFT; ECC; SHIVANI & EPIAORTIC US BY DR. ERNANDEZ [facility] (N/A) 7 Days Post-Op  Precautions: Fall, Sternal  Chart, physical therapy assessment, plan of care and goals were reviewed. ASSESSMENT  Patient continues with skilled PT services and is progressing towards goals. Patient is POD #7 s/p re-do AVR. Patient found seated in chair and agreeable to PT. Patient continues to demonstrate L sided weakness > R, and patient reports she feels off balance and reports L LE instability while ambulating. Patient CGA for sit <> stand transfers with cues for move in the tube. Patient ambulated 35 feet x 2 with intermittent min A x 1 required due to L/R trunk sway. One standing rest break between bouts due to report of SOB, spo2 95% on RA with activity. Patient completed 2 steps with L rail and up to mod A x 1 due to L knee instability and report of LBP. Patient demonstrates excessive use of L UE on rail despite cues to limit weightbearing/ move in the tube. Patient returned to room and left seated in chair with OT present. Had discussion with patient regarding discharge, discussing patient's wish to return home vs therapy recommendation for IPR. Patent acknowledged that her activity tolerance is much reduced compared to her PLOF, and states she feels she would need her  to provide high amounts of assistance for most tasks. Patient in agreement with IPR at discharge to promote improved stability and reduced risk for falls with gait and to improve activity tolerance for maximum safety once home. This information relayed to CM.       Patient is verbalizing and is not demonstrating understanding of mindful-based movements (\"move in the tube\") principles of keeping UEs proximal to ribcage to prevent lateral pull on the sternum during load-bearing activities with verbal and tactile cues required for compliance. Patient's progression toward goals since last assessment: all goals upgraded     Current Level of Function Impacting Discharge (mobility/balance): ambulates 35 feet x2 with min-CGA x 1 on RA     Functional Outcome Measure: The patient scored 20/28 on the tinetti outcome measure which is indicative of moderate risk for falls. Other factors to consider for discharge: spo2 stable on RA, mod A x 1 for stairs, continued L sided weakness, moderate risk for falls         PLAN :  Goals have been updated based on progression since last assessment. Patient continues to benefit from skilled intervention to address the above impairments. Recommendations and Planned Interventions: bed mobility training, transfer training, gait training, therapeutic exercises, neuromuscular re-education, patient and family training/education, and therapeutic activities      Frequency/Duration: Patient will be followed by physical therapy:  5 times a week to address goals. Recommendation for discharge: (in order for the patient to meet his/her long term goals)  Therapy 3 hours per day 5-7 days per week     This discharge recommendation:  Has been made in collaboration with the attending provider and/or case management    IF patient discharges home will need the following DME: none       SUBJECTIVE:   Patient stated I hope my  can help me with this at home.     OBJECTIVE DATA SUMMARY:   HISTORY:    Past Medical History:   Diagnosis Date    Adverse effect of anesthesia     MORPHINE - DIFFICULTY WAKING    Aneurysm ([facility] [address] 02/2023    AORTIC ANEURYSM    Aortic stenosis 05/04/2010    Arthritis     Asthma 05/04/2010    Congestive heart failure (HCC)     heart disease    Elevated cholesterol 05/04/2010    elevated particle number    Ex-smoker 05/04/2010    Family history of diabetes mellitus (DM) 05/04/2010    Family history of early CAD 05/04/2010    GERD (gastroesophageal reflux disease)     Heart murmur     Hypertension     Ill-defined condition     ALLERGIC TO MOLD AND MILDEW    Osteopenia 05/04/2010    Stroke ([facility] [address] 01/31/2023    TIA    Uterine fibroid 05/04/2010    Vitamin D deficiency 05/10/2010     Past Surgical History:   Procedure Laterality Date    ENDOSCOPY, COLON, DIAGNOSTIC  06/01/2004    due 6/14    HX AORTIC VALVE REPLACEMENT  2014    HX BREAST BIOPSY  06/01/2006    BENIGN    HX GI      COLONOSCOPY    HX GYN  10/01/2001    thermal ablation    HX PACEMAKER  07/18/2014    HX ROTATOR CUFF REPAIR Left 2021    TN UNLISTED PROCEDURE BREAST  01/01/1978    clogged milk duct    TN UNLISTED PROCEDURE CARDIAC SURGERY  01/01/2014    CARDIAC CATH       Personal factors and/or comorbidities impacting plan of care: POD # 7 s/p AVR     Patient mobilized on continuous portable monitor/telemetry. Critical Behavior:  Neurologic State: Alert  Orientation Level: Oriented X4  Cognition: Follows commands  Safety/Judgement: Awareness of environment    Functional Mobility Training:  Bed Mobility:                      Transfers:  Sit to Stand: Contact guard assistance  Stand to Sit: Contact guard assistance                               Balance:  Sitting: Intact; Without support  Sitting - Static: Good (unsupported)  Sitting - Dynamic: Good (unsupported)  Standing: Impaired; Without support  Standing - Static: Good  Standing - Dynamic : Fair    Ambulation/Gait Training:  Distance (ft): 35 Feet (ft) (x2; standing rest between bouts 2/2 fatigue)  Assistive Device: Gait belt  Ambulation - Level of Assistance: Minimal assistance;Contact guard assistance;Assist x1     Gait Description (WDL): Exceptions to WDL  Gait Abnormalities: Altered arm swing;Decreased step clearance;Trunk sway increased        Base of Support: Widened;Center of gravity altered  Stance: Weight shift  Speed/Lilo: Pace decreased (<100 feet/min)  Step Length: Right shortened;Left shortened                   Stairs:  Number of Stairs Trained: 2  Stairs - Level of Assistance: Moderate assistance;Assist X1  Rail Use: Left     Cardiac diagnosis intervention:  Patient instructed and educated on mindful movement principles based on Move in The Tube concept to include maintaining bilateral elbows close to rib cage when performing any load-bearing activity such as getting in/out of bed, pushing up from a chair, opening a door, or lifting a box. Patient was given a handout with diagrams of each correct/incorrect method of performing each of the above tasks. Functional Measure:  Tinetti test:    Sitting Balance: 1  Arises: 1  Attempts to Rise: 2  Immediate Standing Balance: 2  Standing Balance: 2  Nudged: 2  Eyes Closed: 1  Turn 360 Degrees - Continuous/Discontinuous: 1  Turn 360 Degrees - Steady/Unsteady: 0  Sitting Down: 1  Balance Score: 13 Balance total score  Indication of Gait: 1  R Step Length/Height: 1  L Step Length/Height: 1  R Foot Clearance: 1  L Foot Clearance: 1  Step Symmetry: 1  Step Continuity: 0  Path: 1  Trunk: 0  Walking Time: 0  Gait Score: 7 Gait total score  Total Score: 20/28 Overall total score         Tinetti Tool Score Risk of Falls  <19 = High Fall Risk  19-24 = Moderate Fall Risk  25-28 = Low Fall Risk  Tinetti ME. Performance-Oriented Assessment of Mobility Problems in Elderly Patients. [address]; [id number].  (Scoring Description: PT Bulletin Feb. 10, 1993)    Older adults: Phoebe Pastor et al, 2009; n = 1000 [facility] elderly evaluated with ABC, LESLI, ADL, and IADL)  · Mean LESLI score for males aged 69-68 years = 26.21(3.40)  · Mean LESLI score for females age 69-68 years = 25.16(4.30)  · Mean LESLI score for males over 80 years = 23.29(6.02)  · Mean LESLI score for females over 80 years = 17.20(8.32)         Pain Ratin/10    Activity Tolerance:   Good, SpO2 stable on RA, requires rest breaks, and observed SOB with activity      After treatment patient left in no apparent distress:   Sitting in chair and Call bell within reach    COMMUNICATION/COLLABORATION:   The patients plan of care was discussed with: Occupational therapist, Registered nurse, and Case management.      Melany Whatley, PT   Time Calculation: 25 mins

## 2023-02-14 NOTE — PROGRESS NOTES
Problem: Falls - Risk of  Goal: *Absence of Falls  Description: Document June Shukla Fall Risk and appropriate interventions in the flowsheet. Outcome: Progressing Towards Goal  Note: Fall Risk Interventions:            Medication Interventions: Evaluate medications/consider consulting pharmacy    Elimination Interventions:  Toileting schedule/hourly rounds              Problem: Patient Education: Go to Patient Education Activity  Goal: Patient/Family Education  Outcome: Progressing Towards Goal     Problem: Patient Education: Go to Patient Education Activity  Goal: Patient/Family Education  Outcome: Progressing Towards Goal     Problem: Cardiac Valve Surgery: Post-Op Day 2  Goal: Off Pathway (Use only if patient is Off Pathway)  Outcome: Progressing Towards Goal  Goal: Activity/Safety  Outcome: Progressing Towards Goal  Goal: Consults, if ordered  Outcome: Progressing Towards Goal  Goal: Diagnostic Test/Procedures  Outcome: Progressing Towards Goal  Goal: Nutrition/Diet  Outcome: Progressing Towards Goal  Goal: Discharge Planning  Outcome: Progressing Towards Goal  Goal: Medications  Outcome: Progressing Towards Goal  Goal: Respiratory  Outcome: Progressing Towards Goal  Goal: Treatments/Interventions/Procedures  Outcome: Progressing Towards Goal  Goal: Psychosocial  Outcome: Progressing Towards Goal  Goal: *Hemodynamically stable without vasoactive medications  Outcome: Progressing Towards Goal  Goal: *Stable cardiac rhythm  Outcome: Progressing Towards Goal  Goal: *Lungs clear or at baseline  Outcome: Progressing Towards Goal  Goal: *Optimal pain control at patient's stated goal  Outcome: Progressing Towards Goal  Goal: *Demonstrates progressive activity  Outcome: Progressing Towards Goal  Goal: *Tolerating diet  Outcome: Progressing Towards Goal  Goal: *Incisions without signs and symptoms of wound complication  Outcome: Progressing Towards Goal     Problem: Cardiac Valve Surgery: Post-Op Day 3  Goal: Off Pathway (Use only if patient is Off Pathway)  Outcome: Progressing Towards Goal  Goal: Activity/Safety  Outcome: Progressing Towards Goal  Goal: Diagnostic Test/Procedures  Outcome: Progressing Towards Goal  Goal: Nutrition/Diet  Outcome: Progressing Towards Goal  Goal: Discharge Planning  Outcome: Progressing Towards Goal  Goal: Medications  Outcome: Progressing Towards Goal  Goal: Respiratory  Outcome: Progressing Towards Goal  Goal: Treatments/Interventions/Procedures  Outcome: Progressing Towards Goal  Goal: Psychosocial  Outcome: Progressing Towards Goal  Goal: *Hemodynamically stable without vasoactive medications  Outcome: Progressing Towards Goal  Goal: *Stable cardiac rhythm  Outcome: Progressing Towards Goal  Goal: *Lungs clear or at baseline  Outcome: Progressing Towards Goal  Goal: *Optimal pain control at patient's stated goal  Outcome: Progressing Towards Goal  Goal: *Incisions without signs and symptoms of wound complication  Outcome: Progressing Towards Goal  Goal: *Demonstrates progressive activity  Outcome: Progressing Towards Goal  Goal: *Tolerating diet  Outcome: Progressing Towards Goal     Problem: Cardiac Valve Surgery: Post-Op Day 4  Goal: Off Pathway (Use only if patient is Off Pathway)  Outcome: Progressing Towards Goal  Goal: Activity/Safety  Outcome: Progressing Towards Goal  Goal: Diagnostic Test/Procedures  Outcome: Progressing Towards Goal  Goal: Nutrition/Diet  Outcome: Progressing Towards Goal  Goal: Discharge Planning  Outcome: Progressing Towards Goal  Goal: Medications  Outcome: Progressing Towards Goal  Goal: Respiratory  Outcome: Progressing Towards Goal  Goal: Treatments/Interventions/Procedures  Outcome: Progressing Towards Goal  Goal: Psychosocial  Outcome: Progressing Towards Goal  Goal: *Hemodynamically stable  Outcome: Progressing Towards Goal  Goal: *Stable cardiac rhythm  Outcome: Progressing Towards Goal  Goal: *Lungs clear or at baseline  Outcome: Progressing Towards Goal  Goal: *Optimal pain control at patient's stated goal  Outcome: Progressing Towards Goal  Goal: *Incisions without signs and symptoms of wound complication  Outcome: Progressing Towards Goal  Goal: *Ambulating and performing exercises with assistance  Outcome: Progressing Towards Goal  Goal: *Demonstrates progressive activity  Outcome: Progressing Towards Goal  Goal: *Tolerating diet  Outcome: Progressing Towards Goal     Problem: Cardiac Valve Surgery: Post-Op Day 5  Goal: Off Pathway (Use only if patient is Off Pathway)  Outcome: Progressing Towards Goal  Goal: Activity/Safety  Outcome: Progressing Towards Goal  Goal: Diagnostic Test/Procedures  Outcome: Progressing Towards Goal  Goal: Nutrition/Diet  Outcome: Progressing Towards Goal  Goal: Discharge Planning  Outcome: Progressing Towards Goal  Goal: Medications  Outcome: Progressing Towards Goal  Goal: Respiratory  Outcome: Progressing Towards Goal  Goal: Treatments/Interventions/Procedures  Outcome: Progressing Towards Goal  Goal: Psychosocial  Outcome: Progressing Towards Goal  Goal: *Hemodynamically stable  Outcome: Progressing Towards Goal  Goal: *Stable cardiac rhythm  Outcome: Progressing Towards Goal  Goal: *Lungs clear or at baseline  Outcome: Progressing Towards Goal  Goal: *Optimal pain control at patient's stated goal  Outcome: Progressing Towards Goal  Goal: *Incisions without signs and symptoms of wound complication  Outcome: Progressing Towards Goal  Goal: *Ambulating and performing exercises with assistance  Outcome: Progressing Towards Goal  Goal: *Demonstrates progressive activity  Outcome: Progressing Towards Goal  Goal: *Tolerating diet  Outcome: Progressing Towards Goal

## 2023-02-14 NOTE — PROGRESS NOTES
Transitions of Care Plan  Transitions of Care Plan  RUR: 11% - low  Clinical Update: s/p CABG  Consults: Therapy  Baseline: independent without DME; resides w   Barriers to Discharge: medical  Disposition:  Acute Rehab: [facility] - accepted  Insurance Auth: BCBS - approved  Estimated Discharge Date:  2/14/23    CM updated this afternoon that patient has been medically accepted and insurance authorized for placement to acute rehab tomorrow at [facility]. Facility able to accept patient at [facility] attempted to speak with patient re update - patient currently resting this afternoon.  Will follow up in the AM.    Luda Narvaez, [address]  Available via [facility] or

## 2023-02-14 NOTE — PROGRESS NOTES
Cardiac Surgery Care Coordinator- Met with Nirav Le and her family,  reviewed plan of care and discussed upcoming discharge plan. Reinforced sternal precautions and encouraged continued use of the incentive spirometer. Began discharge teaching and encouraged her to verbalize. Reviewed goals for the day and discussed the  importance of increased activity and continued activity after discharge. Will continue to follow for educational and emotional needs.  Mary Hurd RN

## 2023-02-14 NOTE — PROGRESS NOTES
Problem: Self Care Deficits Care Plan (Adult)  Goal: *Acute Goals and Plan of Care (Insert Text)  Description: FUNCTIONAL STATUS PRIOR TO ADMISSION: Pt unable to provide PLOF at time of evaluation d/t impaired cognition. Per chart review pt is independent and active without use of DME.      HOME SUPPORT: The patient lived with her  Sofya Cardenas) but did not require assist with ADL tasks. Per chart, pt is still working in administration. Occupational Therapy Goals  Initiated 2/9/2023  1. Patient will perform basic grooming routine in supported sitting with minimal assistance within 7 day(s). 2.  Patient will perform anterior neck to thigh bathing with moderate assistance within 7 day(s). 3.  Patient will perform upper body dressing with moderate assistance within 7 day(s). 4.  Patient will perform lateral rolling in prep for completion of toileting routine with moderate assistance within 7 day(s). 5.  Patient will participate in upper extremity therapeutic exercise/activities with supervision and visual/verbal cues for 5 minutes within 7 day(s). Outcome: Progressing Towards Goal   OCCUPATIONAL THERAPY TREATMENT  Patient: Eddie Altman (51 y.o. female)  Date: 2/14/2023  Diagnosis: Aortic stenosis, severe [I35.0] <principal problem not specified>  Procedure(s) (LRB):  RIGHT FEMORAL CUTDOWN; RE-DO STERNOTOMY;  AORTIC VALVE REPLACEMENT (AVR) WITH 21MM INSPIRIS BIOPROSTHETIC VALVE; ASCENDING AORTIC ANEURYSM REPAIR WITH 28MM HEMASHIELD TUBE GRAFT; ECC; SHIVANI & EPIAORTIC US BY DR. ERNANDEZ [facility] (N/A) 7 Days Post-Op  Precautions: Fall, Sternal  Chart, occupational therapy assessment, plan of care, and goals were reviewed. ASSESSMENT  Patient continues with skilled OT services and is progressing towards goals. Pt's performance of ADL/IADL tasks continues to be limited at this time by impaired activity tolerance, standing balance, generalized weakness, and limited insight into deficits.  Pt received seated in bedside chair and agreeable to participation in therapy session. She continues to demonstrate limited activity tolerance with minimal activity, requiring extended time and seated/standing rest breaks d/t fatigue. All VSS on RA. Up to min A required to maintain balance during standing ADLs that require use of BUEs (I.e washing hands). Otherwise, pt relying heavily on support of BUEs during bathroom mobility despite education regarding \"move in the tube\" precautions. Seated dressing completed with min A to CGA, again with multiple rest breaks and encouragement for active participation in task. Pt repeatedly expressing concerns regarding completion of ADL tasks at d/c and reporting her need to rely on her  to physically complete most ADLs and all IADLs. Educated pt on benefits of IPR given her PLOF and plan to return to work/regain functional independence. Pt acknowledging and expressing agreement with plan to d/c to IPR. Pt left seated in bedside chair with all needs met. RN notified of session. Patient is verbalizing and is not demonstrating understanding of mindful-based movements (\"move in the tube\") principles of keeping UEs proximal to ribcage to prevent lateral pull on the sternum during load-bearing activities with visual, verbal, manual, and tactile cues required for compliance. Current Level of Function Impacting Discharge (ADLs): CGA to min A UB/LB dressing and standing grooming     Other factors to consider for discharge: PLOF, limited insight          PLAN :  Patient continues to benefit from skilled intervention to address the above impairments. Continue treatment per established plan of care to address goals.       Recommendation for discharge: (in order for the patient to meet his/her long term goals)  Therapy 3 hours per day 5-7 days per week    This discharge recommendation:  Has been made in collaboration with the attending provider and/or case management    IF patient discharges home will need the following DME: shower chair       SUBJECTIVE:   Patient stated Ismael Frames are you going to tell my  all of this? He needs to know so he can do these things.  (Regarding educating  on bathing/dressing technique following sx. Unfortunately,  has not been present for any therapy sessions throughout admission.)    OBJECTIVE DATA SUMMARY:   Cognitive/Behavioral Status:  Neurologic State: Alert  Orientation Level: Oriented X4  Cognition: Follows commands  Perception: Appears intact  Perseveration: No perseveration noted  Safety/Judgement: Awareness of environment    Functional Mobility and Transfers for ADLs:  Bed Mobility:       Transfers:  Sit to Stand: Contact guard assistance  Functional Transfers  Bathroom Mobility: Contact guard assistance;Minimum assistance (up to min A, pt relying on BUE support on counter with fatigue)  Cues: Verbal cues provided;Visual cues provided;Physical assistance       Balance:  Sitting: Intact; Without support  Sitting - Static: Good (unsupported)  Sitting - Dynamic: Good (unsupported)  Standing: Impaired; Without support  Standing - Static: Good  Standing - Dynamic : Fair    ADL Intervention:       Grooming  Grooming Assistance: Minimum assistance  Position Performed: Standing  Washing Hands: Minimum assistance  Cues: Verbal cues provided;Physical assistance            Upper Body Dressing Assistance  Pullover Shirt: Minimum assistance    Lower Body Dressing Assistance  Underpants: Contact guard assistance  Pants With Elastic Waist: Contact guard assistance  Leg Crossed Method Used: Yes  Position Performed: Seated in chair  Cues: Verbal cues provided         Cognitive Retraining  Safety/Judgement: Awareness of environment    Patient instructed and educated on mindful movement principles based on Move in The Tube concept to include maintaining bilateral elbows close to rib cage when performing any load-bearing activity such as getting in/out of bed, pushing up from a chair, opening a door, or lifting a box. Patient was given a handout with diagrams of each correct/incorrect method of performing each of the above tasks. Patient instructed on the ability to utilize upper extremities outside the tube when doing any non-load bearing activity such as washing hair/body, brushing teeth, retrieving clothing items, or scratching your back. Patient encouraged to also perform upper extremity exercises \"outside of the tube\" to prevent scar tissue formation around sternal incision site. Patient instructed in detail about activities to heed with caution, allowing pain to be the guide. These activities include but are not limited to: mowing the lawn, riding a bike, walking a dog, lifting a child, workshop hobbies, golfing, sexual activity, vacuuming, fishing, scrubbing the floors, and moving furniture. Patient was given the [address] in the [place] handout to describe each of these activities in detail. Patient instructed no asymmetrical reaching over head to ensure B UEs when shoulders >90* i.e. reaching in cabinets and dressing. Instruction on upper body dressing techniques of over head, then arms through to decrease pain and unilateral shoulder flexion >90*. Instruction on the benefits of utilizing B UEs during functional tasks i.e. opening the fridge, stepping into the tub. Instruction if continued pain at home with shoulder IR for BM hygiene can use wet wipes and toilet tongs PRN. Avoid valsalva maneuvers. May have to adjust home setup to increase ease with items closer to waist height to prevent deep bending and the automatic  of asymmetrical UE WB/pushing for stabilization during bending. Benefit to don clothing tailor sitting and don all clothing while sitting prior to standing. Patient demonstrated lower body dressing with Contact guard assistance.  Instruction and indicated understanding on the benefits of loose clothing throughout to accommodate for post surgical swelling, decreased ROM and increased pain. Instruction and indicated understanding the technique of pull over shirt versus front open clothing. Increase activity tolerance for home, work, and sexual intercourse by pacing self with increasing the arm exercises, sitting duration, frequency OOB, walking, standing, and ADLs. Instructed and indicated understanding of s/s of too much activity, how to respond to s/s safely. Pain:  Pt reporting mild pain with functional mobility     Activity Tolerance:   Fair, requires frequent rest breaks, and observed SOB with activity    After treatment patient left in no apparent distress:   Sitting in chair and Call bell within reach    COMMUNICATION/COLLABORATION:   The patients plan of care was discussed with: Physical therapist and Registered nurse.      PARIS Gomes, OTR/L  Time Calculation: 34 mins

## 2023-02-14 NOTE — PROGRESS NOTES
1930: Bedside and Verbal shift change report given to Edy Figueroa RN (oncoming nurse) by Viviana De La Fuente RN (offgoing nurse). Report included the following information SBAR, Intake/Output, MAR, Recent Results, and Cardiac Rhythm AV paced . 2028: K+ 3.1; Spoke w/ SHASHI Stockton; orders received to give PO K+ 40 mEq     2120: Pt taken down for PA & lateral     Shift Summary: Uneventful shift- Pt stable overnight     0730: Bedside and Verbal shift change report given to Jose Blakely RN (oncoming nurse) by Edy Figueroa RN (offgoing nurse). Report included the following information SBAR, Intake/Output, MAR, Recent Results, and Cardiac Rhythm AV paced .

## 2023-02-14 NOTE — PROGRESS NOTES
0800- bedside report received. Patient in chair without complaints. Uneventful shift. Patient up in chair 3 times for meals. 2000- Bedside and Verbal shift change report given to Monik Rand RN (oncoming nurse) by Isaac Grossman RN (offgoing nurse).  Report included the following information SBAR, Kardex, Recent Results, and Cardiac Rhythm SR .

## 2023-02-15 VITALS
TEMPERATURE: 97.8 F | RESPIRATION RATE: 23 BRPM | HEIGHT: 60 IN | SYSTOLIC BLOOD PRESSURE: 153 MMHG | OXYGEN SATURATION: 98 % | BODY MASS INDEX: 34.32 KG/M2 | HEART RATE: 80 BPM | DIASTOLIC BLOOD PRESSURE: 66 MMHG | WEIGHT: 174.82 LBS

## 2023-02-15 LAB
ALBUMIN SERPL-MCNC: 2.8 G/DL (ref 3.5–5)
ALBUMIN/GLOB SERPL: 0.8 (ref 1.1–2.2)
ALP SERPL-CCNC: 58 U/L (ref 45–117)
ALT SERPL-CCNC: 17 U/L (ref 12–78)
ANION GAP SERPL CALC-SCNC: 8 MMOL/L (ref 5–15)
AST SERPL-CCNC: 14 U/L (ref 15–37)
BILIRUB SERPL-MCNC: 0.7 MG/DL (ref 0.2–1)
BUN SERPL-MCNC: 9 MG/DL (ref 6–20)
BUN/CREAT SERPL: 25 (ref 12–20)
CALCIUM SERPL-MCNC: 8.8 MG/DL (ref 8.5–10.1)
CHLORIDE SERPL-SCNC: 105 MMOL/L (ref 97–108)
CO2 SERPL-SCNC: 24 MMOL/L (ref 21–32)
CREAT SERPL-MCNC: 0.36 MG/DL (ref 0.55–1.02)
ERYTHROCYTE [DISTWIDTH] IN BLOOD BY AUTOMATED COUNT: 14.6 % (ref 11.5–14.5)
GLOBULIN SER CALC-MCNC: 3.5 G/DL (ref 2–4)
GLUCOSE SERPL-MCNC: 116 MG/DL (ref 65–100)
HCT VFR BLD AUTO: 31.1 % (ref 35–47)
HGB BLD-MCNC: 9.6 G/DL (ref 11.5–16)
MAGNESIUM SERPL-MCNC: 2.1 MG/DL (ref 1.6–2.4)
MCH RBC QN AUTO: 27.9 PG (ref 26–34)
MCHC RBC AUTO-ENTMCNC: 30.9 G/DL (ref 30–36.5)
MCV RBC AUTO: 90.4 FL (ref 80–99)
NRBC # BLD: 0 K/UL (ref 0–0.01)
NRBC BLD-RTO: 0 PER 100 WBC
PLATELET # BLD AUTO: 265 K/UL (ref 150–400)
PMV BLD AUTO: 10.9 FL (ref 8.9–12.9)
POTASSIUM SERPL-SCNC: 4.3 MMOL/L (ref 3.5–5.1)
PROT SERPL-MCNC: 6.3 G/DL (ref 6.4–8.2)
RBC # BLD AUTO: 3.44 M/UL (ref 3.8–5.2)
SODIUM SERPL-SCNC: 137 MMOL/L (ref 136–145)
WBC # BLD AUTO: 9.8 K/UL (ref 3.6–11)

## 2023-02-15 PROCEDURE — 36415 COLL VENOUS BLD VENIPUNCTURE: CPT

## 2023-02-15 PROCEDURE — 74011250637 HC RX REV CODE- 250/637: Performed by: NURSE PRACTITIONER

## 2023-02-15 PROCEDURE — 74011000250 HC RX REV CODE- 250

## 2023-02-15 PROCEDURE — 83735 ASSAY OF MAGNESIUM: CPT

## 2023-02-15 PROCEDURE — 74011000250 HC RX REV CODE- 250: Performed by: NURSE PRACTITIONER

## 2023-02-15 PROCEDURE — 74011250637 HC RX REV CODE- 250/637

## 2023-02-15 PROCEDURE — 80053 COMPREHEN METABOLIC PANEL: CPT

## 2023-02-15 PROCEDURE — 85027 COMPLETE CBC AUTOMATED: CPT

## 2023-02-15 RX ORDER — LOSARTAN POTASSIUM 25 MG/1
50 TABLET ORAL DAILY
Status: DISCONTINUED | OUTPATIENT
Start: 2023-02-15 | End: 2023-02-15 | Stop reason: HOSPADM

## 2023-02-15 RX ADMIN — ASPIRIN 81 MG: 81 TABLET, CHEWABLE ORAL at 09:37

## 2023-02-15 RX ADMIN — SODIUM CHLORIDE, PRESERVATIVE FREE 10 ML: 5 INJECTION INTRAVENOUS at 07:32

## 2023-02-15 RX ADMIN — LOSARTAN POTASSIUM 50 MG: 25 TABLET, FILM COATED ORAL at 09:37

## 2023-02-15 RX ADMIN — SODIUM CHLORIDE, PRESERVATIVE FREE 10 ML: 5 INJECTION INTRAVENOUS at 07:31

## 2023-02-15 RX ADMIN — MAGNESIUM OXIDE 400 MG (241.3 MG MAGNESIUM) TABLET 400 MG: TABLET at 09:37

## 2023-02-15 RX ADMIN — MAGNESIUM OXIDE 400 MG (241.3 MG MAGNESIUM) TABLET 400 MG: TABLET at 17:19

## 2023-02-15 RX ADMIN — CARVEDILOL 6.25 MG: 6.25 TABLET, FILM COATED ORAL at 17:18

## 2023-02-15 RX ADMIN — CARVEDILOL 6.25 MG: 6.25 TABLET, FILM COATED ORAL at 09:37

## 2023-02-15 RX ADMIN — AMIODARONE HYDROCHLORIDE 400 MG: 200 TABLET ORAL at 17:18

## 2023-02-15 RX ADMIN — AMIODARONE HYDROCHLORIDE 400 MG: 200 TABLET ORAL at 09:37

## 2023-02-15 RX ADMIN — PANTOPRAZOLE SODIUM 40 MG: 40 TABLET, DELAYED RELEASE ORAL at 07:31

## 2023-02-15 NOTE — PROGRESS NOTES
Cardiac Surgery Care Coordinator-  Met with Deepak Mays, and her daughter (via phone) reviewed plan of care and discharge instructions. Reinforced move in the tube, sternal precautions and continued use of the incentive spirometer. Reviewed the importance of daily temp and weight monitoring after discharge from inpt rehab., discussed incisional care and reviewed signs and symptoms of infection. Red reminder bracelet on left wrist, reviewed purpose of the bracelet and when to call the MD. Provided Ms Bhargavi Corrigan with her valve ID card and dental prophylaxis card. Reviewed the purpose of both cards. They are  without questions or concerns at this time.  Alex Clark RN

## 2023-02-15 NOTE — DISCHARGE INSTRUCTIONS
Cardiac Surgery Specialist    [address] [place] [address] [address]                             Hayden Evans [address]  Office- [phone]  Fax- [phone]       Office- [phone]  Fax- [phone]  _____________________________________________________________  Dr. Kwesi Mae, ELO Baez, NP  Dr. Silvestre Dawkins, NP     Maddie Lovelace, ELO Lea, ELO Levine, JANA Merrill, JANA Vivar, NP  _____________________________________________________________    Name:[facility]     Surgery & Date: Procedure(s):  RIGHT FEMORAL CUTDOWN; RE-DO STERNOTOMY;  AORTIC VALVE REPLACEMENT (AVR) WITH 21MM INSPIRIS BIOPROSTHETIC VALVE; ASCENDING AORTIC ANEURYSM REPAIR WITH 28MM HEMASHIELD TUBE GRAFT; ECC; SHIVANI & EPIAORTIC US BY DR. ERNANDEZ [facility]    Discharge Date: 2/15/2023    MEDICATIONS:  Please refer to your After Visit Summary for your medication list. If you do not have a prescription for a new medication, you may purchase the medication over the counter. DO NOT TAKE ANY MEDICATIONS THAT ARE NOT ON THIS LIST      INSTRUCTIONS:  NO SMOKING OR TOBACCO PRODUCTS  Follow all the instructions in your discharge book  You may shower. Wash all incisions twice daily with mild soap and water. No lotions, ointments or powder. Call the office immediately for any redness, swelling, or drainage from your incision. Take your temperature daily and call for a temperature of 101 degrees or higher or for any symptoms that make you think you have and infection. Weigh yourself each morning. Call if you gain more than 5 pounds in 48 hours. Use the incentive spirometer 6-8 times a day-10 breaths each time.   Use a pillow or your bear to splint your breastbone when coughing or sneezing. If you feel your breast bone clicking or popping, notify the office immediately. Walk several hundred feet several times daily. DIET  Eat an American Heart Association diet. If you are having trouble with your appetite, eat what you can. Try eating small, frequent meals throughout the day. ACTIVITY  NO DRIVING--you will be evaluated to drive at your follow up visit. Increase your activity by walking several times a day. Stay out of bed most of the day. When sitting, keep your legs elevated. You may ride in a car, but you must get out every hour and walk around. If you ride in a car with an airbag that can not be switched off, put the seat ALL the way back or ride in the back seat. Any load bearing activity can be performed as long as it can be performed \"in the tube\". You can reach \"out of the tube\" when performing activities that don't require heavy lifting. Let pain be your guide, your pain level will keep you from doing anything extreme. FOLLOW UP  Your first follow up appointment will be on 3/14 at 1:00. Our office is located in [address] [place] [address]. Please call our office at [phone] if you are unable to make either one of these appointments. You will be receiving a call before your 5 day appointment to begin cardiac rehab. They are programs located at [address] [place] [address], [address] [place] [address] and [facility].  The contact information is located in your Cardiac Surgery booklet. Please call if you have not been contacted 2-3 weeks after discharge from the hospital.  We will make an appointment with your cardiologist at your last appointment. Consult you primary care physician regarding your influenza &   pneumovax vaccines. 5.   Please bring all medications with you to your appointment.     Signature:___________________________________________________           Ian Dandy Dressing Instructions:    Prineo is a lightweight mesh thats applied over your incision, then coated with a skin adhesive to create a strong, flexible seal that protects against water and bacteria. Your healthcare team chose to use Dermabond Prineo system on your sternal incision. Please share the instruction sheet in your discharge packet with your home health nurse. They will assist with dressing removal 10-14 days after surgery. Prineo stays on for 10-14 days. If you notice the edge of the dressing peeling up, trim the edge but do not remove the dressing. Dont scratch, rub or pick at it. Do not apply topical lotions, ointments, or liquids  You may shower and let soap and water water run on the dressing, but do not scrub it. Be sure to lightly pat it dry when you exit the shower.

## 2023-02-15 NOTE — DISCHARGE SUMMARY
[facility] Discharge Summary     Patient ID:  Shanika Morrissey  [id number]  32 y.o.  1950    Admit date: 2/7/2023    Discharge date: 2/15/2023     Admitting Physician: Tania Perez MD     Referring Cardiologist:  Dr. Milagros Caldwell (primary cardiologist Dr. Liliya Briscoe)    PCP:  Chrissie Collins MD     Admitting Diagnoses: Aortic stenosis    Discharge Diagnoses: s/p AVR    Hospital Problems  Date Reviewed: 1/27/2022            Codes Class Noted POA    Aortic stenosis, severe ICD-10-CM: I35.0  ICD-9-CM: 424.1  2/7/2023 Unknown        S/P aortic valve repair ICD-10-CM: S91.361  ICD-9-CM: V45.89  2/7/2023 Unknown    Overview Signed 2/7/2023  2:56 PM by KASSIE Abdi     RIGHT FEMORAL CUTDOWN; RIGHT FEMORAL ARTERIAL CANNULATION,  RE-DO STERNOTOMY;  HYPOTHERMIC ARREST, ANTEGRADE CEREBRAL PERFUSION VIA INNOMINATE ARTERY, REDO AORTIC VALVE REPLACEMENT (AVR) WITH 21MM INSPIRIS BIOPROSTHETIC VALVE; ASCENDING AORTIC ANEURYSM REPAIR WITH 28MM HEMASHIELD TUBE GRAFT; REPAIR AORTIC TEAR, PULMONARY ARTERY REPAIR, RIGHT ATRIAL REPAIR, ECC; SHIVANI & EPIAORTIC US BY DR. ERNANDEZ [facility]                Discharged Condition: improved    Disposition: transferred to inpatient rehab    Procedures for this admission:  Procedure(s):  RIGHT FEMORAL CUTDOWN; RE-DO STERNOTOMY;  AORTIC VALVE REPLACEMENT (AVR) WITH 21MM INSPIRIS BIOPROSTHETIC VALVE; ASCENDING AORTIC ANEURYSM REPAIR WITH 28MM HEMASHIELD TUBE GRAFT; ECC; SHIVANI & EPIAORTIC US BY DR. ERANNDEZ [facility]    Discharge Medications:      My Medications        ASK your doctor about these medications        Instructions Each Dose to Equal Morning Noon Evening Bedtime   aspirin 81 mg chewable tablet    Your last dose was: Your next dose is: Take 81 mg by mouth daily. 81 mg                 atorvastatin 40 mg tablet  Commonly known as: LIPITOR    Your last dose was: Your next dose is: Take 1 Tab by mouth daily.    40 mg                 calcium-vits J8-I-G0-minerals 166.75 mg- 166.75 unit Cap    Your last dose was: Your next dose is: Take 1 Tablet by mouth daily. 1 Tablet                 chlorhexidine 0.12 % solution  Commonly known as: [facility]    Your last dose was: Your next dose is:         15 mL by Swish and Spit route every twelve (12) hours. 15 mL                 glucosamine 1,000 mg Tab    Your last dose was: Your next dose is: Take 1 Tablet by mouth daily. 1 Tablet                 losartan 100 mg tablet  Commonly known as: COZAAR    Your last dose was: Your next dose is: Take 50 mg by mouth nightly. 50 mg                 montelukast 10 mg tablet  Commonly known as: SINGULAIR    Your last dose was: Your next dose is:         TAKE 1 TABLET BY MOUTH EVERY DAY                  MULTI-VITAMIN PO    Your last dose was: Your next dose is: Take 1 Tablet by mouth daily. 1 Tablet                 mupirocin 2 % ointment  Commonly known as: BACTROBAN    Your last dose was: Your next dose is:         by Both Nostrils route two (2) times a day. omeprazole 40 mg capsule  Commonly known as: PRILOSEC    Your last dose was: Your next dose is: Take 40 mg by mouth Daily (before breakfast). 40 mg                 oxybutynin chloride XL 10 mg CR tablet  Commonly known as: DITROPAN XL    Your last dose was: Your next dose is: Take 10 mg by mouth nightly. 10 mg                 TURMERIC PO    Your last dose was: Your next dose is:         turmeric                           HPI:  Emely Carlos is a 72 y.o. female who was referred for cardiac evaluation by Dr. Dionisio Neff (primary cardiologist Dr. Kylie Gusman). She has a PMH of bicuspid aortic valve s/p bioprosthetic AVR in July 2014 (w/patch to repair aorta), severe aortic stenosis, St. Harish pacemaker r/t CHB after AVR in 2014, HFrEF (EF 35-40%),  HTN, known thoracic aortic aneursym, HLD, asthma, COPD, hx smoker, and GERD/dysphagia.  She has been followed by [facility] with repeat ECHOs through the years, most recent in October showed severe stenosis with a reduced ejection fraction. She was initially evaluated for TAVR but due to her anatomy (coronary arteries are very low, difficult to complete TAVR without blocking off) and her known ascending aneurysm, she was referred to Cardiac Surgery for evaluation. Mrs. Russell Bradshaw states she has been experiencing TINEO with associated chest tightness for the past year but it has gotten worse in the past month. She denies any palpitations or dizziness. However, she states today she had an episode where she was sitting at her desk and experienced loss of vision in her left eye that lasted for approximately 2-3 minutes, then normalized. She lives at home with her  [place] and is independent of her ADLs. She still works in administration. She is COVID vaccinated (including boosters) but has not received her flu vaccine this year. She was a smoker but quit 20 years ago (smoked approximately 20 years), drinks 1x month, and denies recreational drug use. She has a family history significant for early CAD in her father and mother. Her brother also has a history of MI with stent placement. Update: She went to the ED after leaving the office as directed and was admitted overnight. CT head and CTA head/neck were negative for any acute findings (ascending aorta enlarged - known). Carotid duplex with minimal stenosis. Neurology consulted, possibly amaurosis fugax, but duration was brief and atypical. They recommended she was safe to proceed with surgery as planned. Discharge summary recommends ASA and plavix (from my review, plavix not prescribed, would evaluate post-operatively). Hospital Course:   2/8 POD1: Low CI. Increased dobutamine. Weaned off epi later in the day. 2/9 POD2: 1 unit platelets. Speech eval. PM increased 80, dobutamine weaned off.  2/10 POD3: Deline. Head CT. Cont to work with speech.    2/11 POD4: CT drainage continues high, continues net fluid positive, change diuresis to bumex  2/12 POD5: contraction alkalosis, change diuretic to diamox for 3 doses  2/13 POD6: PT/OT. Cont diuresis. Follow- up echo, pa/lat. 2/14 POD7: Medically ready to leave hospital. Awaiting IPR [facility]. She may out-qualify IPR while waiting on auth. 2/15 POD8: Awaiting IPR. Discharge plan:  Ascending aortic aneurysm, end of life bioprosthetic aortic valve s/p redo sternotomy, redo aortic valve replacement, repair ascending aortic aneurysm with tube graft, left femoral cutdown and arterial cannulation on 2/7/23.   - Cont ASA   - SBP goal < 140 (increased losartan to 50mg on 2/15)  - cont coreg      Cardiogenic shock s/p redo sternotomy, redo AVR. PPM increased to rate of 80. Now off drips. Resolved. Acute respiratory failure with hypoxemia s/p redo sternotomy, redo AVR, resolved. Now on room air .  - Pulm toilet: acapella, IS, cough, deep breathe, ambulate      Post operative delirium s/p hypothermic arrest - Resolved mentation at baseline  - strict day/night schedule  - avoid benzos and narcs     Left arm weakness unknown etiology, resolved. Concern for TIA prior to surgery: Patient seen in ED 2 weeks ago, all testing discussed. Per Neurology is safe to proceed with surgery, they favor it possibly being amaurosis fugax. Head CT 2/10, no acute processes. Reached out to Dr. Missy Stephens, she does not need neurology follow up outpatient. Post operative coagulopathy secondary to prolonged cardiopulmonary bypass, currently stable, received multiple blood products to stabilize. Resolved. Acute blood loss anemia, stable, monitor daily. Above transfusion threshold. Thrombocytopenia: resolved, HIT panel negative     CHFrEF: EF 45% on 2/1/23, Non-preserved EF heart failure with documented EF 35-45% via SHIVANI in surgery.  GDMT as follows:  - Cont Coreg  - Losartan 50 mg daily  - spironolactone outpatient  - cardiology to consider jardiance outpatient  - euvolemic on exam, no diuretic today     Hx hypertension, PTA losartan 50mg daily  - cont coreg and losartan     Hx hyperlipidemia  - cont PTA atorvastatin 40mg daily     Hx complete heart block r/t post op AVR: [employer] Harish pacemaker implanted in 2014 (with lead revision) patient states has 8 months of battery left on pacemaker. PPM interrogated and functioning properly, rate increased to 80. Defer to cardiology for settings change. Asthma: No inhalers noted, hx smoker. Cough:  - cont singulair at bedtime     GERD:   - cont PTA omeprazole 40mg daily     Post operative debility secondary to open heart surgery: PT/OT/cardiac rehab. PT recommending IPR. Discharge Vital Signs: Visit Vitals  BP (!) 153/66 (BP 1 Location: Left upper arm, BP Patient Position: At rest)   Pulse 80   Temp 97.8 °F (36.6 °C)   Resp 18   Ht 5' (1.524 m)   Wt 174 lb 13.2 oz (79.3 kg)   SpO2 98%   BMI 34.14 kg/m²       Labs:   Recent Labs     02/15/23  0450 02/13/23  1844 02/13/23  0716   WBC 9.8   < >  --    HGB 9.6*   < >  --    HCT 31.1*   < >  --       < >  --       < >  --    K 4.3   < >  --    BUN 9   < >  --    CREA 0.36*   < >  --    *   < >  --    GLUCPOC  --   --  111    < > = values in this interval not displayed. Patient Instructions/Follow Up Care:  Discharge instructions were reviewed with the patient and family present. Questions were also answered at this time. Prescriptions and medications were reviewed. The patient has a follow up appointment with the Nurse Practitioner or Physician's Assistant on 3/14 1:00. The patient was also instructed to follow up with her primary care physician as needed. The patient and family were encouraged to call with any questions or concerns.        Signed:  John Mcdowell NP  2/15/2023  3:11 PM

## 2023-02-15 NOTE — PROGRESS NOTES
1930: Bedside and Verbal shift change report given to [address] (oncoming nurse) by Sharad Rodríguez RN (offgoing nurse). Report included the following information SBAR, Kardex, Intake/Output, Recent Results, Med Rec Status, Cardiac Rhythm AV Paced, and Alarm Parameters . 2100: Patient assist x1 back to bed. Tolerated ambulation well.     0700: Patient OOB to chair with assist x1, pt tolerated well. 0730: Bedside and Verbal shift change report given to [address] [place] [address] (oncoming nurse) by Rui Clancy RN (offgoing nurse). Report included the following information SBAR, Kardex, Intake/Output, Recent Results, Med Rec Status, Cardiac Rhythm AV Paced, and Alarm Parameters .

## 2023-02-15 NOTE — PROGRESS NOTES
Transitions of Care Plan  RUR: 11% - low  Clinical Update: s/p CABG  Consults: Therapy  Baseline: independent without DME; resides w   Barriers to Discharge: none  Disposition:  Acute Rehab: [place] [address] - accepted  Insurance Auth: BCBS - approved  Estimated Discharge Date:  2/15/23    CM provided update to patient re [facility] acceptance and insurance authorization. Patient agreeable to discharge to [facility] this afternoon for acute rehab. Patient advised she will update her  of same. CM provided update to [facility] and anticipate discharge around 3PM. Updated CT Surgery NP - family may want to discuss prior to discharge. CVI RN updated of same, and will update family on [facility] for discharge. 1824 - [facility] BLS requested for  at 1430.    1300 - Spoke with Yael Sons at [place] [address]. Unable to admit patient at 1500 due to late discharge on [facility]'s end. Family updated (including daughter) and remain agreeable to d/c to [facility] at [facility] this evening. CVICU RN updated of same. [facility] requested to push  time to 7PM - confirmed same. CM called [employer] transportation service and received authorization for stretcher transport to acute rehab. [facility] to update [facility].      ACUTE REHAB TRANSFER:  Accepting MD: Tariq Haider MD  RN Report: [phone]  [facility] : BLS at [facility]    Disposition Plan:  Acute Rehab: [facility]  Transportation: [facility], [facility]  Available via [address] [place] [address] or

## 2023-02-15 NOTE — PROGRESS NOTES
[facility] ICU Progress Note    Admit Date: 2/7/2023  POD:  8 Day Post-Op    02/15/23    Procedure:  Procedure(s):  RIGHT FEMORAL CUTDOWN; RE-DO STERNOTOMY;  AORTIC VALVE REPLACEMENT (AVR) WITH 21MM INSPIRIS BIOPROSTHETIC VALVE; ASCENDING AORTIC ANEURYSM REPAIR WITH 28MM HEMASHIELD TUBE GRAFT; ECC; SHIVANI & EPIAORTIC US BY DR. ERNANDEZ [facility]       SHIVANI 2/7/23 DURING SURGERY  Indications: assessment of ascending aorta and assessment of surgical repair  Modalities: 2D, CF, CWD, contrast, PWD  Probe Type: multiplane  Insertion: atraumatic  Patient Status: intubated    Complications: None  Comments: Pre SHIVANI  SHIVANI views are distorted as the hear is rotated. LV 35-40%, LVH , septal and inf walls down. RV normal.  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 Post SHIVANI  LV fn preserved, ef 50%. RV normal  Bioprosthetic AV in situ, no AI, NO PVL, asc aorta repaired by tube graft. Mean gradient 6 mm across the AV. MR 2+,   LV fn came down to pre op level of ef 35%, same wall motion problems as before. Subjective/24hr events:   Pt seen with Dr. Jey Clay. On RA, afebrile, AV paced @ 80. CC is dry cough. Sitting up in her chair eating breakfast.      Objective:   Pt synopsis:  2/8 POD1: Low CI. Increased dobutamine. Weaned off epi later in the day. 2/9 POD2: 1 unit platelets. Speech eval. PM increased 80, dobutamine weaned off.  2/10 POD3: Deline. Head CT. Cont to work with speech. 2/11 POD4: CT drainage continues high, continues net fluid positive, change diuresis to bumex  2/12 POD5: contraction alkalosis, change diuretic to diamox for 3 doses  2/13 POD6: PT/OT. Cont diuresis. Follow- up echo, pa/lat. 2/14 POD7: Medically ready to leave hospital. Awaiting IPR [address] [place] [address]. She may out-qualify IPR while waiting on auth. 2/15 POD8: Awaiting IPR. Vitals:  Blood pressure (!) 143/71, pulse 80, temperature 97.8 °F (36.6 °C), resp.  rate 20, height 5' (1.524 m), weight 174 lb 13.2 oz (79.3 kg), SpO2 98 %. Temp (24hrs), Av °F (36.7 °C), Min:97.7 °F (36.5 °C), Max:98.7 °F (37.1 °C)      EKG/Rhythm:  AV paced with PPM @ 80    Extubation Date / Time: extubated 23 1646    CT Output: CT out     Oxygen Therapy: RA    CXR:     CXR Results  (Last 48 hours)                 23 2130  XR CHEST PA LAT Final result    Impression:  No pneumothorax post removal of chest tubes. Trace effusions and   mild interstitial pulmonary edema. Narrative:  EXAM:  XR CHEST PA LAT       INDICATION:  post CT pull, postop AVR       COMPARISON: 2023. TECHNIQUE: PA and lateral chest radiographs. FINDINGS: Interval removal of bilateral chest tubes. Stable cardiac and   mediastinal contours. Median sternotomy and aortic valvular replacement. Mild   interstitial pulmonary edema, not significantly changed. No focal   consolidations. Trace bilateral pleural effusions. No pneumothorax.                    Admission Weight: Last Weight   Weight: 170 lb 3.1 oz (77.2 kg) Weight: 174 lb 13.2 oz (79.3 kg)     Intake / Output / Drain:  Current Shift: 02/15 0701 - 02/15 1900  In: 240 [P.O.:240]  Out: 200 [Urine:200]  Last 24 hrs.:   Intake/Output Summary (Last 24 hours) at 2/15/2023 1037  Last data filed at 2/15/2023 0915  Gross per 24 hour   Intake 840 ml   Output 1500 ml   Net -660 ml       EXAM:  General: oriented and following commands, up in chair, smiling, appropriate  Lung: CTA apical, diminished bibasilar,fair effort  Incision: dry and intact, no drainage, sternum stable, left groin incision no erythema, hematoma or drainage, ecchymosis present  Heart: s1s2, reg rate and rhy, paced with ppm  Abdomen: BS present, soft, non-tender, no bruits. + BM  Extremities: warm, well perfused, +2 DP bilaterally  Neurologic: alert and oriented to person, place, time and situation, normal thought processes, motor/sensory intact to right upper and bilateral lower extremities, strength in upper extremities equal    Labs:   Recent Labs     02/15/23  0450 02/13/23  1844 02/13/23  0716   WBC 9.8   < >  --    HGB 9.6*   < >  --    HCT 31.1*   < >  --       < >  --       < >  --    K 4.3   < >  --    BUN 9   < >  --    CREA 0.36*   < >  --    *   < >  --    GLUCPOC  --   --  111    < > = values in this interval not displayed. Assessment:     Active Problems: Aortic stenosis, severe (2/7/2023)      S/P aortic valve repair (2/7/2023)      Overview: RIGHT FEMORAL CUTDOWN; RIGHT FEMORAL ARTERIAL CANNULATION,  RE-DO       STERNOTOMY;  HYPOTHERMIC ARREST, ANTEGRADE CEREBRAL PERFUSION VIA       INNOMINATE ARTERY, REDO AORTIC VALVE REPLACEMENT (AVR) WITH 21MM INSPIRIS       BIOPROSTHETIC VALVE; ASCENDING AORTIC ANEURYSM REPAIR WITH 28MM HEMASHIELD       TUBE GRAFT; REPAIR AORTIC TEAR, PULMONARY ARTERY REPAIR, RIGHT ATRIAL       REPAIR, ECC; SHIVANI & EPIAORTIC US BY DR. ERNANDEZ [facility]       Plan/Recommendations/Medical Decision Making:     Ascending aortic aneurysm, end of life bioprosthetic aortic valve s/p redo sternotomy, redo aortic valve replacement, repair ascending aortic aneurysm with tube graft, left femoral cutdown and arterial cannulation on 2/7/23.   - Cont ASA   - SBP goal < 140 (increased losartan to 50mg this am)  - cont coreg     Cardiogenic shock s/p redo sternotomy, redo AVR. PPM increased to rate of 80. Now off drips. Resolved. Acute respiratory failure with hypoxemia s/p redo sternotomy, redo AVR, resolved. Now on room air .  - Pulm toilet: acapella, IS, cough, deep breathe, ambulate     Post operative delirium s/p hypothermic arrest - Resolved mentation at baseline  - strict day/night schedule  - avoid benzos and narcs    Left arm weakness unknown etiology, resolved. Concern for TIA prior to surgery: Patient seen in ED 2 weeks ago, all testing discussed. Per Neurology is safe to proceed with surgery, they favor it possibly being amaurosis fugax. Head CT 2/10, no acute processes. Reached out to Dr. Cher Pace, she does not need neurology follow up outpatient. Post operative coagulopathy secondary to prolonged cardiopulmonary bypass, currently stable, received multiple blood products to stabilize. Resolved. Acute blood loss anemia, stable, monitor daily. Above transfusion threshold. Thrombocytopenia: resolved, HIT panel negative    CHFrEF: EF 45% on 2/1/23, Non-preserved EF heart failure with documented EF 35-45% via SHIVANI in surgery. GDMT as follows:  - Cont Coreg  - Losartan 50 mg daily  - spironolactone outpatient  - may add jardiance closer to discharge  - euvolemic on exam, no diuretic today    Hx hypertension, PTA losartan 50mg daily  - cont coreg and losartan    Hx hyperlipidemia  - cont PTA atorvastatin 40mg daily    Hx complete heart block r/t post op AVR: St Harish pacemaker implanted in 2014 (with lead revision) patient states has 8 months of battery left on pacemaker. PPM interrogated and functioning properly, rate increased to 80. Defer to cardiology for settings change. Asthma: No inhalers noted, hx smoker. GERD:   - cont PTA omeprazole 40mg daily    Post operative debility secondary to open heart surgery: PT/OT/cardiac rehab. PT recommending IPR. Disposition: intermediate status, no bed yet. Can shower when she gets to the floor.  Medically ready for discharge, awaiting placement - she may go to [facility] today at 3pm.    Signed By: Wilber Child NP

## 2023-02-15 NOTE — PROGRESS NOTES
0800- bedside report received. Patient in chair without complaints. 9595- patient will be transported by [facility] to [facility] inpatient rehab at 1500 today. Family and patient are aware. Questions and concerns addressed at this time. 1200- patient reported that rep from [facility] informed her that pickup will be delayed. Will speak to ESTELITA Cano.    1315- ESTELITA Cano confirmed pickup will be later; 1900 today. 1510- discharge packet on chart. Will call report this evening. 1730- report called to Len Dennison RN at [facility] inpatient rehab. All questions addressed; will update Len Dennison when [facility] arrives to transport patient. 1915- [facility] at bedside to transport patient. Report given, VSS. Patient assisted to stretcher without issue. All required paperwork given to [facility] and patient left with them.

## 2023-02-16 NOTE — OP NOTES
[address]   OPERATIVE REPORT    Name:  Nora Carbajal  MR#:  [id number]  :  1950  ACCOUNT #:  [de-identified]  DATE OF SERVICE:  2023    PREOPERATIVE DIAGNOSES:  1. Severe aortic regurgitation. 2.  Ascending aortic aneurysm. POSTOPERATIVE DIAGNOSES:  1. Severe aortic regurgitation. 2.  Ascending aortic aneurysm. PROCEDURES PERFORMED:  1. Aortic hemiarch graft including isolation and control of arch vessels, beveled open distal anastomosis extending under the arch vessels with total circulatory arrest and isolated cerebral perfusion (circulatory arrest time 15 minutes; CPT code 11653). 2.  Ascending aortic aneurysm replacement using a 28 mm Hemashield graft (CPT code 34437). 3.  Aortic valve replacement using a 21 mm tissue valve (CPT code 96296). 4.  Femoral artery cutdown for the purpose of peripheral cardiopulmonary bypass (CPT code 23670). 5.  Redo sternotomy following previous valve procedure greater than one month (CPT code 52553). SURGEON:  Kj Warner MD    ASSISTANT:  Sushant Petersen NP    ANESTHESIA:  General endotracheal anesthesia. COMPLICATIONS:  None. SPECIMENS REMOVED:  Ascending aortic aneurysm. IMPLANTS:  1.  28 mm Hemashield graft. 2.  21 mm tissue valve. ESTIMATED BLOOD LOSS:  50 mL. INDICATIONS:  The patient is a very pleasant 28-year-old woman suffering from severe aortic regurgitation and an ascending aortic aneurysm following previous aortic valve replacement. She is now being brought to the operating room to have her ascending aorta replaced and her aortic valve replaced. PROCEDURE:  The patient was brought to the operating room, underwent general endotracheal anesthesia without complications. Right radial A-line placed without complication, [place]-Lilian catheter placed without complications. Chest, abdomen and lower extremities were prepped and draped in the usual sterile fashion.   A midline incision was made on the patient's sternum. Cautery dissection was used to dissect down to the level of the sternal bone and the sternal bone with an oscillating saw shortly after identifying the left common femoral artery and left common femoral vein. We then dissected up in the mediastinum and noticed what appeared to be separation from the previously placed patch that was used to repair her aorta at her previous AVR. This area appeared to have pulled apart and created a pseudoaneurysm-type structure that had to be considered with the surgery. In an effort to try to get around the pseudoaneurysm structure, we realized we were going to have to go on circulatory arrest to repair it, so we immediately started cooling down to 18 degrees and eventually performed isolated cerebral perfusion, and a hemiarch graft was placed due to the extensive nature of the pseudoaneurysm. We had cannulated with a 25-Dominican venous cannula at the right atrium and also had a retrograde cardioplegia catheter through a pursestring in the right antrum, and eventually had   drop down to the aortic annulus. Once on circulatory arrest, then we performed her distal repair and then came off arrest and came up to speed on cardiac pulmonary bypass and then performed the proximal anastomosis using a 5-0 Prolene suture. Care was taken to de-air the graft before tying and letting it down. Also prior to doing the ascending aortic graft, we replaced the bioprosthetic that she had before with a 21 mm tissue valve using 2-0 Ethibond sutures circumferentially around the aortic valve annulus, and we tied it in without complications. After the graft was in, we came off cardiopulmonary bypass, de-aird the graft, removed all the clamps. The patient appeared stable, and heart looked good at this point. We then placed A and V wires, A/C locaters and Tyler drains. Wires were used to close the sternum, and Vicryl suture was used to close the subcutaneous tissue.   I was present for the entire procedure.; ; PA-C assistance was needed due to difficulty of procedure, dissection, and identification of pertinent anatomy throughout the case         Chante Barreto MD      [id number]  D:  02/16/2023 12:50  T:  02/16/2023 18:11  JOB #:  [id number]

## 2023-02-16 NOTE — ADT AUTH CERT NOTES
Aortic Aneurysm, Thoracic, Repair with Graft - Care Day 7 (2/13/2023) by [facility]       Review Status Review Entered   Completed 2/15/2023 1637       Created By   [facility]      Criteria Review      Care Day: 7 Care Date: 2/13/2023 Level of Care: Intermediate Care    Guideline Day 4    Level Of Care    (X) Floor    2/15/2023 16:37:08 EST by [facility]      intermediate care    Clinical Status    (X) * No new cognitive or neurologic dysfunction    (X) * No evidence of significant arterial embolization    (X) * Oxygenation at baseline    Activity    ( ) * Limited ambulation with assistance    2/15/2023 16:37:08 EST by [facility]      up in chair    Routes    (X) IV medications    2/15/2023 16:37:08 EST by [facility]      diamox 250mg IV q6, magnesium sulfate 2g IV x 1    (X) Advance diet    2/15/2023 16:37:08 EST by [facility]      ADULT DIET Regular; 4 carb choices (60 gm/meal); Low Fat/Low Chol/High Fiber/JESUS; No Concentrated Sweets    Interventions    (X) * Chest tube absent    (X) * Oxygen absent    Medications    (X) * Epidural analgesics absent    (X) Multimodal analgesia    2/15/2023 16:37:08 EST by [facility]      tylenol 1000mg po q6 prn x 1    * Milestone   Additional Notes   2/13/23       Vitals:   98.8 °F, 80, 119/70, 37, 30, 94% Nasal cannula 1 l/min, 94% room air   Wt: 80kg      Abnl/Pertinent Labs/Radiology/Diagnostic Studies:   2/13/23 04:33   RBC: 3.63 (L)   HGB: 10.1 (L)   HCT: 33.2 (L)   Potassium: 3.0 (L)   Glucose: 119 (H)   Creatinine: 0.45 (L)   BUN/Creatinine ratio: 31 (H)   Protein, total: 6.1 (L)   Albumin: 3.0 (L)      2/13/23 18:44   Potassium: 3.1 (L)      CXR:   IMPRESSION   No pneumothorax post removal of chest tubes. Trace effusions and   mild interstitial pulmonary edema. ECHO:   ·  Technical qualifiers: Echo study was technically difficult. Contrast used: Definity.    ·  Left Ventricle: Low normal left ventricular systolic function with a visually estimated EF of 50 - 55%. Septal motion is consistent with post-operative status. ·  Aortic Valve: Bioprosthetic valve. No regurgitation. No significant stenosis. AV mean gradient is 20 mmHg. AV peak velocity is 2.9 m/s. LVOT:AV VTI Index is 0.53. Physical Exam:   Neurologic: strength in upper extremities equal      MD Consults/Assessments & Plans:   CSS:   Assessment:       Active Problems: Aortic stenosis, severe (2/7/2023)         S/P aortic valve repair (2/7/2023)       Overview: RIGHT FEMORAL CUTDOWN; RIGHT FEMORAL ARTERIAL CANNULATION,  RE-DO        STERNOTOMY;  HYPOTHERMIC ARREST, ANTEGRADE CEREBRAL PERFUSION VIA        INNOMINATE ARTERY, REDO AORTIC VALVE REPLACEMENT (AVR) WITH 21MM INSPIRIS        BIOPROSTHETIC VALVE; ASCENDING AORTIC ANEURYSM REPAIR WITH 28MM HEMASHIELD        TUBE GRAFT; REPAIR AORTIC TEAR, PULMONARY ARTERY REPAIR, RIGHT ATRIAL        REPAIR, ECC; SHIVANI & EPIAORTIC US BY DR. ERNANDEZ [facility]      Plans:   Plan/Recommendations/Medical Decision Making:      -Ascending aortic aneurysm, end of life bioprosthetic aortic valve s/p redo sternotomy, redo aortic valve replacement, repair ascending aortic aneurysm with tube graft, left femoral cutdown and arterial cannulation on 2/7/23.    - Cont ASA    - cont coreg        -Acute respiratory failure with hypoxemia s/p redo sternotomy, redo AVR, room air while awake, 1-2L NC when sleeping, fair effort. Contraction alkalosis on 2/12, received 3 doses diamox. - bumex 2 mg PO once today       -CHFrEF: EF 45% on 2/1/23, Non-preserved EF heart failure with documented EF 35-45% via SHIVANI in surgery. GDMT as follows:   - Cont Coreg   - transition to PO bumex 2 mg today       -Hx hyperlipidemia   - cont PTA atorvastatin 40mg daily       -Hx complete heart block r/t post op AVR: [employer] Harish pacemaker implanted in 2014 (with lead revision) patient states has 8 months of battery left on pacemaker.  PPM interrogated and functioning properly, rate increased to 80. Defer to cardiology for settings change. -GERD:    - cont PTA omeprazole 40mg daily       -Post operative debility secondary to open heart surgery: anticipate d/c tomorrow. Medications:   Cordarone 400mg PO BID   ASA 81mg PO daily   Coreg 6.25mg PO BID   Lidocaine 4% 2 patch TD Q24   Mag-ox 400mg PO BID   Melatonin 6mg PO QHS PRN x 1   Singulair 10mg PO QHS   Ditropan XL 10mg PO QHS   Protonix 40mg PO daily   Bumex 2mg PO x 1   Klor-con 40meq PO x 2      Orders:   SCD   Cardiac monitoring x 72 hours (until 2/13)   Elevate HOB   Incentive spirometry Q1   Intake and output Q4   Continuous oximetry    Pacemaker care   Weigh daily      SLP:   Noted pt advanced to regular diet/thin liquids over the weekend by nursing. Discussed with RN Keesha Ozuna who states pt tolerating without issue. Therefore, recommend continuing diet. Follow up for cognition will be appropriate at next level of care. PT:   ASSESSMENT:   Current Level of Function Impacting Discharge (mobility/balance): ambulates 30 feet x 2 with CGA-Min A x 1 on RA        Other factors to consider for discharge: 3 DONOVAN and not able to participate in stair training yet, cues for adherence to move in the tube, impaired balance, impaired activity tolerance        PLAN :   Patient continues to benefit from skilled intervention to address the above impairments. Continue treatment per established plan of care. to address goals. Recommendation for discharge: (in order for the patient to meet his/her long term goals)   Therapy 3 hours per day 5-7 days per week vs  PT pending continued progress towards goals      OT:   ASSESSMENT:   Current Level of Function Impacting Discharge (ADLs): CGA functional transfers in prep for ADLs, max A toileting routine/hygiene        Other factors to consider for discharge: PLOF        PLAN :   Patient continues to benefit from skilled intervention to address the above impairments.   Continue treatment per established plan of care to address goals. Recommendation for discharge: (in order for the patient to meet his/her long term goals)   Therapy 3 hours per day 5-7 days per week      CM:   Disposition:   Acute Rehab: [facility] - under review   Insurance Auth: [employer] - will start if accepted by [facility]            Aortic Aneurysm, Thoracic, Repair with Graft - Care Day 6 (2/12/2023) by [facility]       Review Status Review Entered   Completed 2/14/2023 1439       Created By   [facility]      Criteria Review      Care Day: 6 Care Date: 2/12/2023 Level of Care: Intermediate Care    Guideline Day 3    Level Of Care    (X) Intermediate care    Clinical Status    (X) * No evidence of postoperative or surgical site infection    Activity    (X) * Initiate ambulation    2/14/2023 14:39:18 EST by [facility]      w/ PT    Routes    (X) IV medications    2/14/2023 14:39:18 EST by [facility]      diamox 250mg IV q6    Interventions    (X) * Pulmonary artery catheter absent    (X) * Arterial lines absent    Medications    (X) * Parenteral vasoactive medication absent    (X) Multimodal analgesia    2/14/2023 14:39:18 EST by [facility]      tylenol 1000mg po q6 prn x 1    * Milestone   Additional Notes   2/12/23       Pertinent Updates:   CT drainage still high   contraction alkalosis, change diuretic to diamox for 3 doses      Vitals:   98.8 °F, 80, 126/109, 144/66, 112/53, 43, 26, 22, 82% nasal cannula 2l/min then 91% room air      Abnl/Pertinent Labs/Radiology/Diagnostic Studies:   GLUCOSE,FAST - POC: 170 (H)   RBC: 3.70 (L)   HGB: 10.2 (L)   HCT: 33.7 (L)   PLATELET: 877 (L)   Potassium: 3.3 (L)   CO2: 33 (H)   Glucose: 114 (H)   Creatinine: 0.44 (L)   BUN/Creatinine ratio: 43 (H)   Protein, total: 6.0 (L)   Albumin: 3.0 (L)   A-G Ratio: 1.0 (L)   AST: 13 (L)      CXR:   IMPRESSION   No pneumothorax following removal of right IJ sheath.  Left basilar atelectasis      Physical Exam:   Abdomen: + BM   Neurologic: alert and oriented to person, place, time and situation, normal thought processes, only slight strength change on L continues but improved      MD Consults/Assessments & Plans:   Cardiothoracic surgery:   Assessment:       Active Problems: Aortic stenosis, severe (2/7/2023)   S/P aortic valve repair (2/7/2023)   Overview: RIGHT FEMORAL CUTDOWN; RIGHT FEMORAL ARTERIAL CANNULATION,  RE-DO        STERNOTOMY;  HYPOTHERMIC ARREST, ANTEGRADE CEREBRAL PERFUSION VIA        INNOMINATE ARTERY, REDO AORTIC VALVE REPLACEMENT (AVR) WITH 21MM INSPIRIS        BIOPROSTHETIC VALVE; ASCENDING AORTIC ANEURYSM REPAIR WITH 28MM HEMASHIELD        TUBE GRAFT; REPAIR AORTIC TEAR, PULMONARY ARTERY REPAIR, RIGHT ATRIAL        REPAIR, ECC; SHIVANI & EPIAORTIC US BY DR. ERNANDEZ [facility]       Plan/Recommendations/Medical Decision Making:       -Ascending aortic aneurysm, end of life bioprosthetic aortic valve s/p redo sternotomy, redo aortic valve replacement, repair ascending aortic aneurysm with tube graft, left femoral cutdown and arterial cannulation on 2/7/23.   -pull CT   -pull pacing wires      -Acute respiratory failure with hypoxemia s/p redo sternotomy, redo AVR, room air while awake, 1-2L NC when sleeping, fair effort. Contraction alkalosis, change to diamox for next 24 hours   - Pulm toilet: acapella, IS, cough, deep breathe, ambulate       -Left arm weakness unknown etiology, continues to improved. Per Discharge Summary from hospitalist, start ASA and add plavix at discharge.       -Mixed cardiac and non-cardiac fluid overload with contraction alkalosis and serum CO2 33, will utilize dimox today for diuresis   Thrombocytopenia, HIT panel negative, platelets recovering, add asa today      Medications:   Cordarone 400mg PO BID   ASA 81mg PO daily   Lipitor 40mg PO QHS   Coreg 6.25mg PO BID   Lidocain 4% 2 patch TD Q24   Mag-ox 400mg PO BID   Melatonin 6mg PO QHS PRN x 1   Singulair 10mg PO QHS   Ditropan XL 10mg PO QHS   Protonix 40mg PO daily Pericolace 8.6-50mg PO daily   Bactroban 2% ointment BID both nostril      Orders:   SCD   Cardiac monitoring x 72 hours (until 2/13)   Elevate HOB   Incentive spirometry Q1   Intake and output Q4   Continuous oximetry    Pacemaker care   Weigh daily      PT:   Current Level of Function Impacting Discharge (mobility/balance): min/CGA for all mobility, amb up to 12' with RW       Other factors to consider for discharge: move in tube. PLAN :   Patient continues to benefit from skilled intervention to address the above impairments. Continue treatment per established plan of care. to address goals.        Recommendation for discharge: (in order for the patient to meet his/her long term goals)   Therapy 3 hours per day 5-7 days per week VS HHPT pending progress      CM:   REHANA- Pending referral to [facility]

## 2023-02-27 DIAGNOSIS — Z95.2 S/P AVR (AORTIC VALVE REPLACEMENT) AND AORTOPLASTY: Primary | ICD-10-CM

## 2023-02-27 RX ORDER — POTASSIUM CHLORIDE 750 MG/1
20 TABLET, EXTENDED RELEASE ORAL DAILY
Qty: 14 TABLET | Refills: 0 | Status: SHIPPED | OUTPATIENT
Start: 2023-02-27 | End: 2023-03-06

## 2023-02-27 RX ORDER — FUROSEMIDE 20 MG/1
40 TABLET ORAL DAILY
Qty: 14 TABLET | Refills: 0 | Status: SHIPPED | OUTPATIENT
Start: 2023-02-27 | End: 2023-03-06

## 2023-03-02 ENCOUNTER — TELEPHONE (OUTPATIENT)
Dept: CARDIOLOGY CLINIC | Age: 73
End: 2023-03-02

## 2023-03-02 NOTE — TELEPHONE ENCOUNTER
I called Mrs. Chris Russell and asked how her breathing and lower extremity edema was doing. She states she has started using her Dulera inhaler twice daily and her SOB has significantly improved. Additionally, she states the edema in her ankles is completely gone and she went from 174lbs on Monday to 165 today. I had initially prescribed 40mg PO lasix daily for 7 days but told her she could stop the lasix now.      Signed By: Miranda Richter NP     March 2, 2023

## 2023-03-14 ENCOUNTER — OFFICE VISIT (OUTPATIENT)
Dept: CARDIOLOGY CLINIC | Age: 73
End: 2023-03-14
Payer: COMMERCIAL

## 2023-03-14 VITALS
TEMPERATURE: 94.5 F | DIASTOLIC BLOOD PRESSURE: 75 MMHG | SYSTOLIC BLOOD PRESSURE: 132 MMHG | HEART RATE: 81 BPM | WEIGHT: 162.6 LBS | BODY MASS INDEX: 31.76 KG/M2

## 2023-03-14 DIAGNOSIS — Z95.2 S/P AVR (AORTIC VALVE REPLACEMENT): Primary | ICD-10-CM

## 2023-03-14 PROCEDURE — 99024 POSTOP FOLLOW-UP VISIT: CPT | Performed by: THORACIC SURGERY (CARDIOTHORACIC VASCULAR SURGERY)

## 2023-03-14 RX ORDER — CARVEDILOL 6.25 MG/1
6.25 TABLET ORAL 2 TIMES DAILY WITH MEALS
COMMUNITY

## 2023-03-14 RX ORDER — LANOLIN ALCOHOL/MO/W.PET/CERES
400 CREAM (GRAM) TOPICAL DAILY
COMMUNITY

## 2023-03-14 NOTE — PROGRESS NOTES
Patient: Becka Rosado   Age: 72 y.o. Patient Care Team:  Zack Ferrari MD as PCP - General (Internal Medicine Physician)  Kieth Najjar, MD as Referring Provider (Cardiovascular Disease Physician)  Anant Russell DO (Orthopedic Surgery)  Alec Orosco MD (Cardio Vascular Surgery)    Diagnosis: There were no encounter diagnoses. Problem List:   Patient Active Problem List   Diagnosis Code    Ex-smoker Z87.891    Family history of early CAD Z80.55    Family history of diabetes mellitus (DM) Z83.3    Asthma J45.909    Osteopenia M85.80    Aortic stenosis I35.0    Uterine fibroid D25.9    Elevated cholesterol E78.00    Vitamin D deficiency E55.9    S/P AVR (aortic valve replacement) and aortoplasty Z95.2    Dislocation of left shoulder joint S43.005A    Tear of left rotator cuff M75.102    Status post arthroscopy of shoulder Z98.890    TIA (transient ischemic attack) G45.9    Aortic stenosis, severe I35.0    HFrEF (heart failure with reduced ejection fraction) ([facility]) I50.20    S/P aortic valve repair Z98.890        Date of Surgery: 2/7    Surgery: RIGHT FEMORAL CUTDOWN; RIGHT FEMORAL ARTERIAL CANNULATION,  RE-DO STERNOTOMY;  HYPOTHERMIC ARREST, ANTEGRADE CEREBRAL PERFUSION VIA INNOMINATE ARTERY, REDO AORTIC VALVE REPLACEMENT (AVR) WITH 21MM INSPIRIS BIOPROSTHETIC VALVE; ASCENDING AORTIC ANEURYSM REPAIR WITH 28MM HEMASHIELD TUBE GRAFT; REPAIR AORTIC TEAR, PULMONARY ARTERY REPAIR, RIGHT ATRIAL REPAIR, ECC; SHIVANI & EPIAORTIC US BY DR. ERNANDEZ [facility]    HPI:  Pt is here for post op follow up. She reports she is feeling pretty good but still has some fatigue and low appetite. She denies fever, incisional drainage and openings, and denies sternal clicking/rubbing. She reports that Dr. Lupe Pike called her a couple of weeks ago to let her know he saw some afib on her device interrogation and he initiated xarelto on her.  She denies pain, reports some SOB when she exerts herself, denies dizziness, palpitations, and reports her LE is greatly improved. She is having regular bowel movements and sleeping well. She sounds regular on auscultation today. Current Medications:   Current Outpatient Medications   Medication Sig Dispense Refill    glucosamine 1,000 mg tab Take 1 Tablet by mouth daily. chlorhexidine (PERIDEX) 0.12 % solution 15 mL by Swish and Spit route every twelve (12) hours. 118 mL 0    mupirocin (BACTROBAN) 2 % ointment by Both Nostrils route two (2) times a day. 15 g 0    aspirin 81 mg chewable tablet Take 81 mg by mouth daily. calcium-vits C2-W-O3-minerals 166.75 mg- 166.75 unit cap Take 1 Tablet by mouth daily. TURMERIC PO turmeric      omeprazole (PRILOSEC) 40 mg capsule Take 40 mg by mouth Daily (before breakfast). oxybutynin chloride XL (DITROPAN XL) 10 mg CR tablet Take 10 mg by mouth nightly. losartan (COZAAR) 100 mg tablet Take 50 mg by mouth nightly. montelukast (SINGULAIR) 10 mg tablet TAKE 1 TABLET BY MOUTH EVERY DAY (Patient taking differently: Take 10 mg by mouth nightly.) 90 Tab 3    atorvastatin (LIPITOR) 40 mg tablet Take 1 Tab by mouth daily. (Patient taking differently: Take 40 mg by mouth nightly.) 30 Tab 11    MULTI-VITAMIN PO Take 1 Tablet by mouth daily. Vitals: There were no vitals taken for this visit. Allergies: is allergic to morphine.     Physical Exam:  Wounds: clean, dry, no drainage, healing    Lungs: clear to auscultation bilaterally in upper lobes, some crackles LLL    Heart: regular rate and rhythm, S1, S2 normal, no murmur, click, rub or gallop    Extremities: normal strength, tone, and muscle mass    Assessment/Plan:     Ascending aortic aneurysm, end of life bioprosthetic aortic valve s/p redo sternotomy, redo aortic valve replacement, repair ascending aortic aneurysm with tube graft, left femoral cutdown and arterial cannulation on 2/7/23.   - Cont ASA   - SBP goal < 140 (increased losartan to 50mg on 2/15)  - cont coreg     Afib:  - cont yandy per Dr. James Medina  - will call to reschedule her next apt with him, she needs to be seen by  him soon than May      Left arm weakness unknown etiology, resolved. Concern for TIA prior to surgery: Patient seen in ED 2 weeks ago, all testing discussed. Per Neurology is safe to proceed with surgery, they favor it possibly being amaurosis fugax. Head CT 2/10, no acute processes. Reached out to Dr. Higinio Pineda, she does not need neurology follow up outpatient. CHFrEF: EF 45% on 2/1/23, Non-preserved EF heart failure with documented EF 35-45% via SHIVANI in surgery. GDMT as follows:  - Cont Coreg  - Losartan 50 mg daily  - spironolactone outpatient  - cardiology to consider jardiance outpatient  - euvolemic on exam, no diuretic today     Hx hypertension, PTA losartan 50mg daily  - cont coreg and losartan     Hx hyperlipidemia  - cont PTA atorvastatin 40mg daily     Hx complete heart block r/t post op AVR: [employer] Harish pacemaker implanted in 2014 (with lead revision) patient states has 8 months of battery left on pacemaker. PPM interrogated and functioning properly. Defer to cardiology for settings change. Asthma: No inhalers noted, hx smoker. She wishes to stop her nightly inhaler bc she reports she feels well. I have asked her to follow up with her PCP regarding asthma management. Cough:  - cont singulair at bedtime     GERD:   - cont PTA omeprazole 40mg daily     Post operative debility secondary to open heart surgery: PT/OT/cardiac rehab. Almost completed with in-home PT, will then start cardiac rehab. Pt is ready to start cardiac rehab.      Rehab - not yet  Walking: yes, with a cane  Antibiotic card for valves: yes

## 2023-03-20 NOTE — PROGRESS NOTES
Patient ambulated to bathroom with minimal assistance. Discharge instructions reviewed with patient and all questions and concerns were answered. Saline locked IV removed without complications. Patient wheeled to hospital entrance where  was waiting to drive home. Evangelista Alvarez

## 2023-04-14 ENCOUNTER — HOSPITAL ENCOUNTER (OUTPATIENT)
Dept: CARDIAC REHAB | Age: 73
Discharge: HOME | End: 2023-04-14
Payer: COMMERCIAL

## 2023-04-14 VITALS — BODY MASS INDEX: 32.11 KG/M2 | WEIGHT: 164.4 LBS

## 2023-04-14 PROCEDURE — 93798 PHYS/QHP OP CAR RHAB W/ECG: CPT

## 2023-04-17 ENCOUNTER — HOSPITAL ENCOUNTER (OUTPATIENT)
Dept: CARDIAC REHAB | Age: 73
Discharge: HOME | End: 2023-04-17
Payer: COMMERCIAL

## 2023-04-17 VITALS — BODY MASS INDEX: 32.19 KG/M2 | WEIGHT: 164.8 LBS

## 2023-04-17 PROCEDURE — 93798 PHYS/QHP OP CAR RHAB W/ECG: CPT

## 2023-04-19 ENCOUNTER — HOSPITAL ENCOUNTER (OUTPATIENT)
Dept: CARDIAC REHAB | Age: 73
Discharge: HOME | End: 2023-04-19
Payer: COMMERCIAL

## 2023-04-19 VITALS — WEIGHT: 164.8 LBS | BODY MASS INDEX: 32.19 KG/M2

## 2023-04-19 PROCEDURE — 93798 PHYS/QHP OP CAR RHAB W/ECG: CPT

## 2023-04-21 ENCOUNTER — HOSPITAL ENCOUNTER (OUTPATIENT)
Dept: CARDIAC REHAB | Age: 73
Discharge: HOME | End: 2023-04-21
Payer: COMMERCIAL

## 2023-04-21 VITALS — WEIGHT: 164.2 LBS | BODY MASS INDEX: 32.07 KG/M2

## 2023-04-21 PROCEDURE — 93798 PHYS/QHP OP CAR RHAB W/ECG: CPT

## 2023-04-24 ENCOUNTER — HOSPITAL ENCOUNTER (OUTPATIENT)
Dept: CARDIAC REHAB | Age: 73
Discharge: HOME | End: 2023-04-24
Payer: COMMERCIAL

## 2023-04-24 VITALS — BODY MASS INDEX: 32.19 KG/M2 | WEIGHT: 164.8 LBS

## 2023-04-24 PROCEDURE — 93798 PHYS/QHP OP CAR RHAB W/ECG: CPT

## 2023-04-26 ENCOUNTER — HOSPITAL ENCOUNTER (OUTPATIENT)
Dept: CARDIAC REHAB | Age: 73
Discharge: HOME | End: 2023-04-26
Payer: COMMERCIAL

## 2023-04-26 VITALS — WEIGHT: 164.4 LBS | BODY MASS INDEX: 32.11 KG/M2

## 2023-04-26 PROCEDURE — 93798 PHYS/QHP OP CAR RHAB W/ECG: CPT

## 2023-04-28 ENCOUNTER — HOSPITAL ENCOUNTER (OUTPATIENT)
Dept: CARDIAC REHAB | Age: 73
Discharge: HOME | End: 2023-04-28
Payer: COMMERCIAL

## 2023-04-28 VITALS — WEIGHT: 164 LBS | BODY MASS INDEX: 32.03 KG/M2

## 2023-04-28 PROCEDURE — 93797 PHYS/QHP OP CAR RHAB WO ECG: CPT

## 2023-04-28 PROCEDURE — 93798 PHYS/QHP OP CAR RHAB W/ECG: CPT

## 2023-04-30 ENCOUNTER — TELEPHONE (OUTPATIENT)
Dept: CARDIOLOGY CLINIC | Age: 73
End: 2023-04-30

## 2023-05-01 ENCOUNTER — APPOINTMENT (OUTPATIENT)
Facility: HOSPITAL | Age: 73
End: 2023-05-01
Payer: MEDICARE

## 2023-05-01 ENCOUNTER — HOSPITAL ENCOUNTER (OUTPATIENT)
Dept: CARDIAC REHAB | Age: 73
Discharge: HOME | End: 2023-05-01
Payer: COMMERCIAL

## 2023-05-01 VITALS — WEIGHT: 165.2 LBS | BODY MASS INDEX: 32.26 KG/M2

## 2023-05-01 PROCEDURE — 93798 PHYS/QHP OP CAR RHAB W/ECG: CPT

## 2023-05-01 PROCEDURE — 9990 CHARGE CONVERSION

## 2023-05-02 ENCOUNTER — OFFICE VISIT (OUTPATIENT)
Dept: CARDIOLOGY CLINIC | Age: 73
End: 2023-05-02
Payer: COMMERCIAL

## 2023-05-02 VITALS
DIASTOLIC BLOOD PRESSURE: 60 MMHG | HEART RATE: 81 BPM | HEIGHT: 59 IN | RESPIRATION RATE: 14 BRPM | OXYGEN SATURATION: 96 % | BODY MASS INDEX: 32.66 KG/M2 | TEMPERATURE: 98.1 F | WEIGHT: 162 LBS | SYSTOLIC BLOOD PRESSURE: 117 MMHG

## 2023-05-02 DIAGNOSIS — R19.09 GROIN SWELLING: Primary | ICD-10-CM

## 2023-05-02 PROCEDURE — 99024 POSTOP FOLLOW-UP VISIT: CPT | Performed by: THORACIC SURGERY (CARDIOTHORACIC VASCULAR SURGERY)

## 2023-05-02 RX ORDER — LOSARTAN POTASSIUM 50 MG/1
50 TABLET ORAL DAILY
COMMUNITY

## 2023-05-03 ENCOUNTER — HOSPITAL ENCOUNTER (OUTPATIENT)
Dept: CARDIAC REHAB | Age: 73
Discharge: HOME | End: 2023-05-03
Payer: COMMERCIAL

## 2023-05-03 VITALS — WEIGHT: 162.8 LBS | BODY MASS INDEX: 32.88 KG/M2

## 2023-05-03 PROCEDURE — 93798 PHYS/QHP OP CAR RHAB W/ECG: CPT

## 2023-05-03 PROCEDURE — 9990 CHARGE CONVERSION

## 2023-05-05 ENCOUNTER — HOSPITAL ENCOUNTER (OUTPATIENT)
Dept: CARDIAC REHAB | Age: 73
Discharge: HOME | End: 2023-05-05
Payer: COMMERCIAL

## 2023-05-05 VITALS — WEIGHT: 163.4 LBS | BODY MASS INDEX: 33 KG/M2

## 2023-05-05 PROCEDURE — 93798 PHYS/QHP OP CAR RHAB W/ECG: CPT

## 2023-05-05 PROCEDURE — 9990 CHARGE CONVERSION

## 2023-05-08 ENCOUNTER — APPOINTMENT (OUTPATIENT)
Dept: CARDIAC REHAB | Age: 73
End: 2023-05-08
Payer: COMMERCIAL

## 2023-05-08 ENCOUNTER — HOSPITAL ENCOUNTER (OUTPATIENT)
Facility: HOSPITAL | Age: 73
Setting detail: RECURRING SERIES
Discharge: HOME | End: 2023-05-11
Payer: MEDICARE

## 2023-05-08 VITALS — BODY MASS INDEX: 32.96 KG/M2 | WEIGHT: 163.2 LBS

## 2023-05-08 PROCEDURE — 93798 PHYS/QHP OP CAR RHAB W/ECG: CPT

## 2023-05-08 ASSESSMENT — EXERCISE STRESS TEST
PEAK_RPE: 9
PEAK_METS: 2.8
PEAK_HR: 97

## 2023-05-10 ENCOUNTER — HOSPITAL ENCOUNTER (OUTPATIENT)
Facility: HOSPITAL | Age: 73
Setting detail: RECURRING SERIES
Discharge: HOME | End: 2023-05-13
Payer: MEDICARE

## 2023-05-10 ENCOUNTER — APPOINTMENT (OUTPATIENT)
Dept: CARDIAC REHAB | Age: 73
End: 2023-05-10
Payer: COMMERCIAL

## 2023-05-10 VITALS — BODY MASS INDEX: 33.16 KG/M2 | WEIGHT: 164.2 LBS

## 2023-05-10 PROCEDURE — 93798 PHYS/QHP OP CAR RHAB W/ECG: CPT

## 2023-05-10 ASSESSMENT — EXERCISE STRESS TEST
PEAK_RPE: 9
PEAK_METS: 2.8
PEAK_HR: 102

## 2023-05-12 ENCOUNTER — HOSPITAL ENCOUNTER (OUTPATIENT)
Facility: HOSPITAL | Age: 73
Setting detail: RECURRING SERIES
Discharge: HOME | End: 2023-05-15
Payer: MEDICARE

## 2023-05-12 ENCOUNTER — APPOINTMENT (OUTPATIENT)
Dept: CARDIAC REHAB | Age: 73
End: 2023-05-12
Payer: COMMERCIAL

## 2023-05-12 VITALS — WEIGHT: 163.6 LBS | BODY MASS INDEX: 33.04 KG/M2

## 2023-05-12 PROCEDURE — 93798 PHYS/QHP OP CAR RHAB W/ECG: CPT

## 2023-05-12 PROCEDURE — 93797 PHYS/QHP OP CAR RHAB WO ECG: CPT

## 2023-05-12 ASSESSMENT — EXERCISE STRESS TEST
PEAK_HR: 102
PEAK_METS: 2.8
PEAK_RPE: 9

## 2023-05-15 ENCOUNTER — HOSPITAL ENCOUNTER (OUTPATIENT)
Facility: HOSPITAL | Age: 73
Setting detail: RECURRING SERIES
Discharge: HOME | End: 2023-05-18
Payer: MEDICARE

## 2023-05-15 ENCOUNTER — APPOINTMENT (OUTPATIENT)
Dept: CARDIAC REHAB | Age: 73
End: 2023-05-15
Payer: COMMERCIAL

## 2023-05-15 VITALS — BODY MASS INDEX: 33.29 KG/M2 | WEIGHT: 164.8 LBS

## 2023-05-15 PROCEDURE — 93798 PHYS/QHP OP CAR RHAB W/ECG: CPT

## 2023-05-15 ASSESSMENT — EXERCISE STRESS TEST
PEAK_HR: 109
PEAK_RPE: 10
PEAK_METS: 3.4

## 2023-05-17 ENCOUNTER — APPOINTMENT (OUTPATIENT)
Dept: CARDIAC REHAB | Age: 73
End: 2023-05-17
Payer: COMMERCIAL

## 2023-05-17 ENCOUNTER — HOSPITAL ENCOUNTER (OUTPATIENT)
Facility: HOSPITAL | Age: 73
Setting detail: RECURRING SERIES
Discharge: HOME | End: 2023-05-20
Payer: MEDICARE

## 2023-05-17 VITALS — WEIGHT: 163.6 LBS | BODY MASS INDEX: 33.04 KG/M2

## 2023-05-17 PROCEDURE — 93798 PHYS/QHP OP CAR RHAB W/ECG: CPT

## 2023-05-17 ASSESSMENT — EXERCISE STRESS TEST
PEAK_RPE: 10
PEAK_HR: 107
PEAK_METS: 3.4

## 2023-05-19 ENCOUNTER — HOSPITAL ENCOUNTER (OUTPATIENT)
Facility: HOSPITAL | Age: 73
Setting detail: RECURRING SERIES
Discharge: HOME | End: 2023-05-22
Payer: MEDICARE

## 2023-05-19 ENCOUNTER — APPOINTMENT (OUTPATIENT)
Dept: CARDIAC REHAB | Age: 73
End: 2023-05-19
Payer: COMMERCIAL

## 2023-05-19 VITALS — WEIGHT: 165.2 LBS | BODY MASS INDEX: 33.37 KG/M2

## 2023-05-19 PROCEDURE — 93798 PHYS/QHP OP CAR RHAB W/ECG: CPT

## 2023-05-19 ASSESSMENT — EXERCISE STRESS TEST
PEAK_RPE: 13
PEAK_HR: 107
PEAK_METS: 3.4

## 2023-05-22 ENCOUNTER — APPOINTMENT (OUTPATIENT)
Dept: CARDIAC REHAB | Age: 73
End: 2023-05-22
Payer: COMMERCIAL

## 2023-05-22 ENCOUNTER — HOSPITAL ENCOUNTER (OUTPATIENT)
Facility: HOSPITAL | Age: 73
Setting detail: RECURRING SERIES
Discharge: HOME | End: 2023-05-25
Payer: MEDICARE

## 2023-05-22 VITALS — BODY MASS INDEX: 33.16 KG/M2 | WEIGHT: 164.2 LBS

## 2023-05-22 PROCEDURE — 93798 PHYS/QHP OP CAR RHAB W/ECG: CPT

## 2023-05-22 ASSESSMENT — EXERCISE STRESS TEST
PEAK_RPE: 12
PEAK_HR: 107
PEAK_METS: 3.4

## 2023-05-24 ENCOUNTER — HOSPITAL ENCOUNTER (OUTPATIENT)
Facility: HOSPITAL | Age: 73
Setting detail: RECURRING SERIES
Discharge: HOME | End: 2023-05-27
Payer: MEDICARE

## 2023-05-24 ENCOUNTER — APPOINTMENT (OUTPATIENT)
Dept: CARDIAC REHAB | Age: 73
End: 2023-05-24
Payer: COMMERCIAL

## 2023-05-24 VITALS — WEIGHT: 164.2 LBS | BODY MASS INDEX: 33.16 KG/M2

## 2023-05-24 PROCEDURE — 93798 PHYS/QHP OP CAR RHAB W/ECG: CPT

## 2023-05-24 ASSESSMENT — EXERCISE STRESS TEST
PEAK_RPE: 12
PEAK_HR: 107
PEAK_METS: 3.4

## 2023-05-26 ENCOUNTER — HOSPITAL ENCOUNTER (OUTPATIENT)
Facility: HOSPITAL | Age: 73
Setting detail: RECURRING SERIES
Discharge: HOME | End: 2023-05-29
Payer: MEDICARE

## 2023-05-26 ENCOUNTER — APPOINTMENT (OUTPATIENT)
Dept: CARDIAC REHAB | Age: 73
End: 2023-05-26
Payer: COMMERCIAL

## 2023-05-26 VITALS — WEIGHT: 163.2 LBS | BODY MASS INDEX: 32.96 KG/M2

## 2023-05-26 PROCEDURE — 93798 PHYS/QHP OP CAR RHAB W/ECG: CPT

## 2023-05-26 ASSESSMENT — EXERCISE STRESS TEST
PEAK_RPE: 12
PEAK_HR: 111
PEAK_METS: 3.4

## 2023-05-31 ENCOUNTER — HOSPITAL ENCOUNTER (OUTPATIENT)
Facility: HOSPITAL | Age: 73
Setting detail: RECURRING SERIES
Discharge: HOME | End: 2023-06-03
Payer: MEDICARE

## 2023-05-31 ENCOUNTER — APPOINTMENT (OUTPATIENT)
Dept: CARDIAC REHAB | Age: 73
End: 2023-05-31
Payer: COMMERCIAL

## 2023-05-31 VITALS — BODY MASS INDEX: 32.88 KG/M2 | WEIGHT: 162.8 LBS

## 2023-05-31 PROCEDURE — 93798 PHYS/QHP OP CAR RHAB W/ECG: CPT

## 2023-05-31 PROCEDURE — 93797 PHYS/QHP OP CAR RHAB WO ECG: CPT

## 2023-05-31 ASSESSMENT — EXERCISE STRESS TEST
PEAK_HR: 106
PEAK_METS: 3.4
PEAK_RPE: 12

## 2023-06-02 ENCOUNTER — HOSPITAL ENCOUNTER (OUTPATIENT)
Facility: HOSPITAL | Age: 73
Setting detail: RECURRING SERIES
Discharge: HOME | End: 2023-06-05
Payer: MEDICARE

## 2023-06-02 VITALS — WEIGHT: 164.4 LBS | BODY MASS INDEX: 33.2 KG/M2

## 2023-06-02 PROCEDURE — 93798 PHYS/QHP OP CAR RHAB W/ECG: CPT

## 2023-06-02 ASSESSMENT — EXERCISE STRESS TEST
PEAK_METS: 3.4
PEAK_RPE: 12
PEAK_HR: 112

## 2023-06-05 ENCOUNTER — HOSPITAL ENCOUNTER (OUTPATIENT)
Facility: HOSPITAL | Age: 73
Setting detail: RECURRING SERIES
Discharge: HOME | End: 2023-06-08
Payer: MEDICARE

## 2023-06-05 VITALS — WEIGHT: 163.6 LBS | BODY MASS INDEX: 33.04 KG/M2

## 2023-06-05 PROCEDURE — 93798 PHYS/QHP OP CAR RHAB W/ECG: CPT

## 2023-06-05 ASSESSMENT — EXERCISE STRESS TEST
PEAK_RPE: 12
PEAK_METS: 3.4
PEAK_HR: 104

## 2023-06-07 ENCOUNTER — HOSPITAL ENCOUNTER (OUTPATIENT)
Facility: HOSPITAL | Age: 73
Setting detail: RECURRING SERIES
Discharge: HOME | End: 2023-06-10
Payer: MEDICARE

## 2023-06-07 VITALS — WEIGHT: 163.4 LBS | BODY MASS INDEX: 33 KG/M2

## 2023-06-07 PROCEDURE — 93798 PHYS/QHP OP CAR RHAB W/ECG: CPT

## 2023-06-07 PROCEDURE — 93797 PHYS/QHP OP CAR RHAB WO ECG: CPT

## 2023-06-07 ASSESSMENT — EXERCISE STRESS TEST
PEAK_METS: 3.4
PEAK_RPE: 12
PEAK_HR: 104

## 2023-06-07 NOTE — CARDIO/PULMONARY
Goals:  Daily Recommendations:  Calories: 1258 /day  (for weight loss)  Saturated Fat: no more than 12 g/day  Trans Fat: 0 g/day  Sodium: no more than 1500 mg/day  Fruit: 1-2 cups / day  Vegetables: 2 cups/day    Other:  - read and compare food labels   - use margarine instead of butter  - decrease ice cream amount by half once a week  - try at least one new recipe from cookbook in 1 month   - eat at least 1 cup of vegetables every dinner  - choose lean cuts of meat and/or seafood     Keeping a food diary was recommended. Questions addressed. Follow-up plans discussed. Vicenta Sims verbalized understanding.     Ronna Mayes RD

## 2023-06-16 ENCOUNTER — HOSPITAL ENCOUNTER (OUTPATIENT)
Facility: HOSPITAL | Age: 73
Setting detail: RECURRING SERIES
Discharge: HOME | End: 2023-06-19
Payer: MEDICARE

## 2023-06-16 VITALS — WEIGHT: 162.6 LBS | BODY MASS INDEX: 32.84 KG/M2

## 2023-06-16 PROCEDURE — 93798 PHYS/QHP OP CAR RHAB W/ECG: CPT

## 2023-06-16 ASSESSMENT — EXERCISE STRESS TEST
PEAK_HR: 114
PEAK_METS: 3.4
PEAK_RPE: 12

## 2023-06-19 ENCOUNTER — HOSPITAL ENCOUNTER (OUTPATIENT)
Facility: HOSPITAL | Age: 73
Setting detail: RECURRING SERIES
Discharge: HOME | End: 2023-06-22
Payer: MEDICARE

## 2023-06-19 VITALS — BODY MASS INDEX: 33.12 KG/M2 | WEIGHT: 164 LBS

## 2023-06-19 PROCEDURE — 93798 PHYS/QHP OP CAR RHAB W/ECG: CPT

## 2023-06-19 ASSESSMENT — EXERCISE STRESS TEST
PEAK_METS: 3.4
PEAK_RPE: 12
PEAK_HR: 98

## 2023-06-21 ENCOUNTER — HOSPITAL ENCOUNTER (OUTPATIENT)
Facility: HOSPITAL | Age: 73
Setting detail: RECURRING SERIES
Discharge: HOME | End: 2023-06-24
Payer: MEDICARE

## 2023-06-21 VITALS — WEIGHT: 163 LBS | BODY MASS INDEX: 32.92 KG/M2

## 2023-06-21 PROCEDURE — 93798 PHYS/QHP OP CAR RHAB W/ECG: CPT

## 2023-06-21 ASSESSMENT — EXERCISE STRESS TEST
PEAK_METS: 3.4
PEAK_HR: 100
PEAK_RPE: 12

## 2023-06-26 ENCOUNTER — APPOINTMENT (OUTPATIENT)
Facility: HOSPITAL | Age: 73
End: 2023-06-26
Payer: MEDICARE

## 2023-06-26 ENCOUNTER — HOSPITAL ENCOUNTER (OUTPATIENT)
Facility: HOSPITAL | Age: 73
Setting detail: RECURRING SERIES
Discharge: HOME | End: 2023-06-29
Payer: MEDICARE

## 2023-06-26 VITALS — WEIGHT: 163.8 LBS | BODY MASS INDEX: 33.08 KG/M2

## 2023-06-26 PROCEDURE — 93798 PHYS/QHP OP CAR RHAB W/ECG: CPT

## 2023-06-26 ASSESSMENT — EJECTION FRACTION: EF_VALUE: 50

## 2023-06-26 ASSESSMENT — PATIENT HEALTH QUESTIONNAIRE - PHQ9
10. IF YOU CHECKED OFF ANY PROBLEMS, HOW DIFFICULT HAVE THESE PROBLEMS MADE IT FOR YOU TO DO YOUR WORK, TAKE CARE OF THINGS AT HOME, OR GET ALONG WITH OTHER PEOPLE: 0
SUM OF ALL RESPONSES TO PHQ9 QUESTIONS 1 & 2: 0
4. FEELING TIRED OR HAVING LITTLE ENERGY: 0
5. POOR APPETITE OR OVEREATING: 0
9. THOUGHTS THAT YOU WOULD BE BETTER OFF DEAD, OR OF HURTING YOURSELF: 0
SUM OF ALL RESPONSES TO PHQ QUESTIONS 1-9: 0
1. LITTLE INTEREST OR PLEASURE IN DOING THINGS: 0
SUM OF ALL RESPONSES TO PHQ QUESTIONS 1-9: 0
8. MOVING OR SPEAKING SO SLOWLY THAT OTHER PEOPLE COULD HAVE NOTICED. OR THE OPPOSITE, BEING SO FIGETY OR RESTLESS THAT YOU HAVE BEEN MOVING AROUND A LOT MORE THAN USUAL: 0
3. TROUBLE FALLING OR STAYING ASLEEP: 0
6. FEELING BAD ABOUT YOURSELF - OR THAT YOU ARE A FAILURE OR HAVE LET YOURSELF OR YOUR FAMILY DOWN: 0
2. FEELING DOWN, DEPRESSED OR HOPELESS: 0
SUM OF ALL RESPONSES TO PHQ QUESTIONS 1-9: 0
7. TROUBLE CONCENTRATING ON THINGS, SUCH AS READING THE NEWSPAPER OR WATCHING TELEVISION: 0
SUM OF ALL RESPONSES TO PHQ QUESTIONS 1-9: 0

## 2023-06-26 ASSESSMENT — EXERCISE STRESS TEST
PEAK_HR: 102
PEAK_METS: 3.7
PEAK_RPE: 12

## 2023-06-26 ASSESSMENT — LIFESTYLE VARIABLES: SMOKELESS_TOBACCO: NO

## 2025-04-01 RX ORDER — DABIGATRAN ETEXILATE 150 MG/1
150 CAPSULE ORAL 2 TIMES DAILY
COMMUNITY

## 2025-04-01 RX ORDER — METOPROLOL SUCCINATE 100 MG/1
100 TABLET, EXTENDED RELEASE ORAL DAILY
COMMUNITY

## 2025-04-01 RX ORDER — SODIUM CHLORIDE 0.9 % (FLUSH) 0.9 %
5-40 SYRINGE (ML) INJECTION PRN
Status: CANCELLED | OUTPATIENT
Start: 2025-04-01

## 2025-04-01 RX ORDER — SODIUM CHLORIDE 9 MG/ML
INJECTION, SOLUTION INTRAVENOUS PRN
Status: CANCELLED | OUTPATIENT
Start: 2025-04-01

## 2025-04-01 RX ORDER — AMOXICILLIN 500 MG/1
2000 CAPSULE ORAL ONCE
COMMUNITY

## 2025-04-01 RX ORDER — SODIUM CHLORIDE 0.9 % (FLUSH) 0.9 %
5-40 SYRINGE (ML) INJECTION EVERY 12 HOURS SCHEDULED
Status: CANCELLED | OUTPATIENT
Start: 2025-04-01

## 2025-04-01 RX ORDER — HYDROCHLOROTHIAZIDE 25 MG/1
25 TABLET ORAL DAILY
COMMUNITY

## 2025-04-02 ENCOUNTER — HOSPITAL ENCOUNTER (OUTPATIENT)
Facility: HOSPITAL | Age: 75
Setting detail: OUTPATIENT SURGERY
Discharge: HOME | End: 2025-04-02
Attending: INTERNAL MEDICINE | Admitting: INTERNAL MEDICINE
Payer: MEDICARE

## 2025-04-02 ENCOUNTER — ANESTHESIA (OUTPATIENT)
Facility: HOSPITAL | Age: 75
End: 2025-04-02
Payer: MEDICARE

## 2025-04-02 ENCOUNTER — ANESTHESIA EVENT (OUTPATIENT)
Facility: HOSPITAL | Age: 75
End: 2025-04-02
Payer: MEDICARE

## 2025-04-02 VITALS
SYSTOLIC BLOOD PRESSURE: 158 MMHG | DIASTOLIC BLOOD PRESSURE: 64 MMHG | BODY MASS INDEX: 33.38 KG/M2 | HEIGHT: 60 IN | OXYGEN SATURATION: 98 % | RESPIRATION RATE: 20 BRPM | TEMPERATURE: 97.8 F | HEART RATE: 68 BPM | WEIGHT: 170 LBS

## 2025-04-02 PROCEDURE — 2709999900 HC NON-CHARGEABLE SUPPLY: Performed by: INTERNAL MEDICINE

## 2025-04-02 PROCEDURE — 3700000000 HC ANESTHESIA ATTENDED CARE: Performed by: INTERNAL MEDICINE

## 2025-04-02 PROCEDURE — 3700000001 HC ADD 15 MINUTES (ANESTHESIA): Performed by: INTERNAL MEDICINE

## 2025-04-02 PROCEDURE — 3600007502: Performed by: INTERNAL MEDICINE

## 2025-04-02 PROCEDURE — 7100000011 HC PHASE II RECOVERY - ADDTL 15 MIN: Performed by: INTERNAL MEDICINE

## 2025-04-02 PROCEDURE — 6360000002 HC RX W HCPCS: Performed by: REGISTERED NURSE

## 2025-04-02 PROCEDURE — 3600007512: Performed by: INTERNAL MEDICINE

## 2025-04-02 PROCEDURE — 7100000010 HC PHASE II RECOVERY - FIRST 15 MIN: Performed by: INTERNAL MEDICINE

## 2025-04-02 PROCEDURE — 2580000003 HC RX 258: Performed by: REGISTERED NURSE

## 2025-04-02 RX ORDER — PHENYLEPHRINE HCL IN 0.9% NACL 0.4MG/10ML
SYRINGE (ML) INTRAVENOUS
Status: DISCONTINUED | OUTPATIENT
Start: 2025-04-02 | End: 2025-04-02 | Stop reason: SDUPTHER

## 2025-04-02 RX ORDER — SODIUM CHLORIDE 9 MG/ML
INJECTION, SOLUTION INTRAVENOUS
Status: DISCONTINUED | OUTPATIENT
Start: 2025-04-02 | End: 2025-04-02 | Stop reason: SDUPTHER

## 2025-04-02 RX ADMIN — LIDOCAINE HYDROCHLORIDE 50 MG: 20 INJECTION, SOLUTION EPIDURAL; INFILTRATION; INTRACAUDAL; PERINEURAL at 09:58

## 2025-04-02 RX ADMIN — PROPOFOL 50 MG: 10 INJECTION, EMULSION INTRAVENOUS at 09:52

## 2025-04-02 RX ADMIN — Medication 160 MCG: at 10:01

## 2025-04-02 RX ADMIN — PROPOFOL 50 MG: 10 INJECTION, EMULSION INTRAVENOUS at 09:58

## 2025-04-02 RX ADMIN — PROPOFOL 20 MG: 10 INJECTION, EMULSION INTRAVENOUS at 10:09

## 2025-04-02 RX ADMIN — LIDOCAINE HYDROCHLORIDE 50 MG: 20 INJECTION, SOLUTION EPIDURAL; INFILTRATION; INTRACAUDAL; PERINEURAL at 09:52

## 2025-04-02 RX ADMIN — SODIUM CHLORIDE: 9 INJECTION, SOLUTION INTRAVENOUS at 09:47

## 2025-04-02 RX ADMIN — PROPOFOL 30 MG: 10 INJECTION, EMULSION INTRAVENOUS at 10:04

## 2025-04-02 ASSESSMENT — PAIN - FUNCTIONAL ASSESSMENT: PAIN_FUNCTIONAL_ASSESSMENT: NONE - DENIES PAIN

## 2025-04-02 NOTE — PERIOP NOTE
ARRIVAL INFORMATION:  Verified patient name and date of birth, scheduled procedure, and informed consent.     : Óscar () contact number: [phone]  Physician and staff can share information with the .     Receive texts: yes    Belongings with patient include:  Clothing,Glasses, Hearing Aides bilateral    GI FOCUSED ASSESSMENT:  Neuro: Awake, alert, oriented x4  Respiratory: even and unlabored   GI: soft and non-distended  EKG Rhythm: paced    Education:Reviewed general discharge instructions and  information.

## 2025-04-02 NOTE — DISCHARGE INSTRUCTIONS
DURGA Werner MD  [facility]  [address], [address]  [place] [address] [phone]  [email or web address]    Nisha Perez  [id number]  1950    COLON DISCHARGE INSTRUCTIONS  Discomfort:  Redness at IV site- apply warm compress to area; if redness or soreness persist- contact your physician  There may be a slight amount of blood passed from the rectum  Gaseous discomfort- walking, belching will help relieve any discomfort  You may not operate a vehicle for 12 hours  You may not engage in an occupation involving machinery or appliances for rest of today  You may not drink alcoholic beverages for at least 12 hours  Avoid making any critical decisions for at least 24 hour  DIET:   High fiber diet.   - however -  remember your colon is empty and a heavy meal will produce gas.   Avoid these foods:  vegetables, fried / greasy foods, carbonated drinks for today      ACTIVITY:  You may resume your normal daily activities it is recommended that you spend the remainder of the day resting -  avoid any strenuous activity.    CALL M.D.  ANY SIGN OF:   Increasing pain, nausea, vomiting  Abdominal distension (swelling)  New increased bleeding (oral or rectal)  Fever (chills)  Pain in chest area  Bloody discharge from nose or mouth  Shortness of breath     COLONOSCOPY FINDINGS:  Your colonoscopy showed: NO polyps found. Do have moderate diverticulosis and internal hemorrhoids.    Follow-up Instructions:   Call Dr. DECLAN Werner if any questions or problems.   Telephone # [phone]    Patient Education on Sedation / Analgesia Administered for Procedure      For 24 hours after general anesthesia or intravenous analgesia / sedation:  Have someone responsible help you with your care  Limit your activities  Do not drive and operate hazardous machinery  Do not make important personal, legal or business decisions  Do not drink alcoholic beverages  If you have

## 2025-04-02 NOTE — PROGRESS NOTES
Endoscopy Case End Note:    1012:  Procedure scope was pre-cleaned, per protocol, at bedside by Jaswant.      1012:  Report received from anesthesia - KARUNA Perkins.  See anesthesia flowsheet for intra-procedure vital signs and events.    1015:  glasses returned to patient.

## 2025-04-02 NOTE — OP NOTE
[facility]                  Colonoscopy Operative Report    4/2/2025      Nisha Perez  [id number]  1950    Procedure Type:   Colonoscopy --screening     Indications:    Screening colonoscopy     Pre-operative Diagnosis: see indication above    Post-operative Diagnosis:  See findings below    :  DECLAN Werner Jr, MD    Referring Provider: Becky Street MD      Sedation:  MAC anesthesia Propofol    Pre-Procedural Exam:      Airway: clear,  No airway problems anticipated  Heart: RRR, without gallops or rubs  Lungs: clear bilaterally without wheezes, crackles, or rhonchi  Abdomen: soft, nontender, nondistended, bowel sounds present  Mental Status: awake, alert and oriented to person, place and time     Procedure Details:  After informed consent was obtained with all risks and benefits of procedure explained and preoperative exam completed, the patient was taken to the endoscopy suite and placed in the left lateral decubitus position.  Upon sequential sedation as per above, a digital rectal exam was performed .  The Olympus videocolonoscope  was inserted in the rectum and carefully advanced to the cecum, which was identified by the ileocecal valve and appendiceal orifice.  The cecum was identified by the ileocecal valve and appendiceal orifice.  The quality of preparation was good.  The colonoscope was slowly withdrawn with careful evaluation between folds. Retroflexion in the rectum was completed demonstrating internal hemorrhoids.     Findings:   Rectum: Grade 1 internal hemorrhoid(s);  Sigmoid:     -Diverticulosis  Descending Colon:     -Diverticulosis  Transverse Colon: normal  Ascending Colon: normal  Cecum: normal  Terminal Ileum: not intubated      Specimen Removed:  none    Complications: None.     EBL:  None.    Impression:    normal colonic mucosa throughout  diverticulosis,  Moderate in degree, involving the descending colon and the

## 2025-04-02 NOTE — ANESTHESIA PRE PROCEDURE
Department of Anesthesiology  Preprocedure Note       Name:  Nisha Perez   Age:  75 y.o.  :  1950                                          MRN:  [id number]         Date:  2025      Surgeon: Surgeon(s):  Stefano Werner Jr., MD    Procedure: Procedure(s):  COLONOSCOPY    Medications prior to admission:   Prior to Admission medications    Medication Sig Start Date End Date Taking? Authorizing Provider   dabigatran (PRADAXA) 150 MG capsule Take 1 capsule by mouth 2 times daily   Yes Provider, MD [facility]   hydroCHLOROthiazide (HYDRODIURIL) 25 MG tablet Take 1 tablet by mouth daily   Yes Provider, MD [facility]   Calcium Carb-Cholecalciferol (CALCIUM + VITAMIN D3 PO) Take 1 tablet by mouth Daily   Yes Provider, MD [facility]   metoprolol succinate (TOPROL XL) 100 MG extended release tablet Take 1 tablet by mouth daily   Yes Provider, MD [facility]   amoxicillin (AMOXIL) 500 MG capsule Take 4 capsules by mouth once 1 hour prior to dental appt.   Yes [facility] MD   Multiple Vitamin (MULTI-VITAMIN PO) Take 1 tablet by mouth daily    Automatic Reconciliation, Ar   aspirin 81 MG chewable tablet Take by mouth daily    Automatic Reconciliation, Ar   atorvastatin (LIPITOR) 40 MG tablet Take by mouth daily 12   Automatic Reconciliation, Ar   Glucosamine Sulfate 1000 MG TABS Take 1 tablet by mouth daily    Automatic Reconciliation, Ar   losartan (COZAAR) 100 MG tablet Take 1 tablet by mouth daily    Automatic Reconciliation, Ar   montelukast (SINGULAIR) 10 MG tablet TAKE 1 TABLET BY MOUTH EVERY DAY 13   Automatic Reconciliation, Ar   omeprazole (PRILOSEC) 40 MG delayed release capsule Take by mouth every morning (before breakfast) 21   Automatic Reconciliation, Ar   oxybutynin (DITROPAN-XL) 10 MG extended release tablet Take 1 tablet by mouth daily 21   Automatic Reconciliation, Ar       Current medications:    No current facility-administered medications for this

## 2025-04-02 NOTE — PROGRESS NOTES
Endoscopy recovery  Patient returned to baseline, vital signs stable (see vital sign flowsheet). Patient offered liquids and tolerated well. Respiratory status within defined limits. Abdomen soft not tender. Skin with in defined limits. Responsible party driving patient home was given the opportunity to ask questions. Patient discharged with documented belongings.     Verified with Dr. Werner that patient can start Pradaxa back today. Patient verbalizes understanding.

## 2025-04-02 NOTE — ANESTHESIA POSTPROCEDURE EVALUATION
Department of Anesthesiology  Postprocedure Note    Patient: Nisha Perez  MRN: [id number]  YOB: 1950  Date of evaluation: 4/2/2025    Procedure Summary       Date: 04/02/25 Room / Location: [facility] 04 / [facility] ENDOSCOPY    Anesthesia Start: 0947 Anesthesia Stop: 1016    Procedure: COLONOSCOPY (Lower GI Region) Diagnosis:       Long term current use of antithrombotics/antiplatelets      Screen for colon cancer      (Long term current use of antithrombotics/antiplatelets [Z79.02])      (Screen for colon cancer [Z12.11])    Surgeons: Stefano Werner Jr., MD Responsible Provider: Kosta Loera MD    Anesthesia Type: MAC ASA Status: 3            Anesthesia Type: MAC    Isabel Phase I: Isabel Score: 10    Isabel Phase II: Isabel Score: 10    Anesthesia Post Evaluation    Patient location during evaluation: PACU  Patient participation: complete - patient participated  Level of consciousness: sleepy but conscious and responsive to verbal stimuli  Pain score: 1  Airway patency: patent  Nausea & Vomiting: no vomiting and no nausea  Cardiovascular status: blood pressure returned to baseline and hemodynamically stable  Respiratory status: acceptable  Hydration status: stable  Multimodal analgesia pain management approach  Pain management: adequate    No notable events documented.

## 2025-04-02 NOTE — H&P
DURGA Werner MD  [facility]  [address], [address]  [place] [address] [phone]  [email or web address]    Gastroenterology Outpatient History and Physical    Patient: Nisha Perez    Physician: DECLAN Werner MD    Vital Signs: There were no vitals taken for this visit.    Allergies:   Allergies   Allergen Reactions    Morphine Other (See Comments)     Cold sweats & nightmares       Chief Complaint: Screening colonoscopy    History of Present Illness: Here for a screening colonoscopy.  Last colonoscopy was 2014 and showed no polyps    On xarelto for afib and has AVR w/ ascending aortic aneurysm repair w/ graft, pulmonary artery repair on 2/7/2023. Cardiologist is Dr. De La Torre. W/ pacemaker now. No cardiac surgeries since then.     Pt reports no smyptosm of heart burn/reflux/dysphagia while on omperaozle. No AP, N/V, change in bowel habits, hematochezia, melena, weight loss. No Fhx of GI cancers. Rare glass of wine, no smoking. On xarelto. No DM or wt loss medications. On PPI QD, no nsaids.     CLN 2014 - Mild diverticulosis of the sigmoid colon. Grade 1 internal hemorrhoids. 10 years.  EGD 2017 - esophagitis otherwise nl. Bx showing intestinal metaplasia but comment notes it is limited and not conclusively barretts.     History:  Past Medical History:   Diagnosis Date    Adverse effect of anesthesia     MORPHINE - DIFFICULTY WAKING    Aneurysm 02/2023    AORTIC ANEURYSM    Aortic stenosis 05/04/2010    Arthritis     Asthma 05/04/2010    Congestive heart failure (HCC)     heart disease    Elevated cholesterol 05/04/2010    elevated particle number    Ex-smoker 05/04/2010    Family history of diabetes mellitus (DM) 05/04/2010    Family history of early CAD 05/04/2010    GERD (gastroesophageal reflux disease)     Heart murmur     Hypertension     Ill-defined condition     ALLERGIC TO MOLD AND MILDEW    Osteopenia 05/04/2010    Stroke (HCC) 
Statement Selected

## (undated) DEVICE — SUMP INTCARD W/ 1/4INCH CONN 20FRENCH 15INCH DLP

## (undated) DEVICE — TRAP ENDOSCP POLYP 2 CHMBR DRAWER TYP

## (undated) DEVICE — CONNECTOR DRNGE W3/8-0.5XH3/16XL3/16IN 2:1 SIL CARD STR

## (undated) DEVICE — ENDOSCOPIC KIT COMPLIANCE ENDOKIT

## (undated) DEVICE — Device: Brand: VASCULAR DILATOR KIT

## (undated) DEVICE — CONTAINER SPEC 20 ML LID NEUT BUFF FORMALIN 10 % POLYPR STS

## (undated) DEVICE — SUTURE PROL SZ 4-0 L30IN NONABSORBABLE BLU SH-1 L22MM 1/2 8526H

## (undated) DEVICE — CATHETER IV 14GA L2IN POLYUR STR ORNG HUB SFTY RADPQ DISP

## (undated) DEVICE — KIT ACCS INTRO 4FR L10CM NDL 21GA L7CM GWIRE L40CM

## (undated) DEVICE — SYRINGE MED 50ML LUERLOCK TIP

## (undated) DEVICE — SUMP PERICARD AD 20FR WT TIP VERSATILE DSGN MULT PRT VENT

## (undated) DEVICE — CONNECTOR DRNGE 3/8 1/2X3/16X3/16IN BASE L5MM ARM L10-13MM

## (undated) DEVICE — CANNULA AORT ROOT INTRO STD TIP 5FR OVERALL LEN 55IN DLP

## (undated) DEVICE — INTENDED TO STANDARDIZE OR CAMERAS TO ALLOW FOR THE USE OF THE OR CAMERA COVER: Brand: ASPEN® O.R. CAMERA COVER

## (undated) DEVICE — TIP SUCT TRNSPAR RIB SURF STD BLB RIG NVENT W/ 5IN1 CONN DYND50138] MEDLINE INDUSTRIES INC]

## (undated) DEVICE — SET PERF L203CM 12IN RED AND BLU AORT ROOT MULT SLIP CONN

## (undated) DEVICE — INSERT SUT HLD F/OCTBS RETRCTR --

## (undated) DEVICE — TUBING PRSS MON L6IN PVC M FEM CONN

## (undated) DEVICE — TR BAND RADIAL ARTERY COMPRESSION DEVICE: Brand: TR BAND

## (undated) DEVICE — 40418 TRENDELENBURG ONE-STEP ARM PROTECTORS LARGE (1 PAIR): Brand: 40418 TRENDELENBURG ONE-STEP ARM PROTECTORS LARGE (1 PAIR)

## (undated) DEVICE — 6 FOOT DISPOSABLE EXTENSION CABLE WITH SAFE CONNECT / SCREW-DOWN

## (undated) DEVICE — CATH 5F 110CM PG145 -- DXTERITY

## (undated) DEVICE — DRAPE SLUSH DISC W44XL66IN ST FOR RND BSIN HUSH SLUSH SYS

## (undated) DEVICE — HI-TORQUE VERSACORE FLOPPY GUIDE WIRE SYSTEM 145 CM: Brand: HI-TORQUE VERSACORE

## (undated) DEVICE — TIDISHIELD TRANSPORT, CONTAINMENT COVER FOR BACK TABLE 4'6" (1.37M) TO 8' (2.43M) IN LENGTH: Brand: TIDISHIELD

## (undated) DEVICE — 3M™ TEGADERM™ TRANSPARENT FILM DRESSING FRAME STYLE, 1626W, 4 IN X 4-3/4 IN (10 CM X 12 CM), 50/CT 4CT/CASE: Brand: 3M™ TEGADERM™

## (undated) DEVICE — SYRINGE ANGIO 10 CC BRL STD PRNT POLYCARB LT BLU MEDALLION

## (undated) DEVICE — COR-KNOT® QUICK LOAD® 6-POUCH: Brand: COR-KNOT® QUICK LOAD®

## (undated) DEVICE — CAUTERY OPHTH HI TEMP 2 IN LOOPTIP FLX EXT SHFT STRL BOVIE

## (undated) DEVICE — GUIDEWIRE VASC L40CM DIA0018IN NDL 21GA L7CM Z S STL MAK

## (undated) DEVICE — Device: Brand: JELCO

## (undated) DEVICE — SUT SLK 2 60IN TIE MP BLK --

## (undated) DEVICE — TRANSFER PK BLD PROD 300ML --

## (undated) DEVICE — PACK PROCEDURE SURG HRT CATH

## (undated) DEVICE — 1000ML,PRESSURE INFUSER W/STOPCOCK: Brand: MEDLINE

## (undated) DEVICE — SYRINGE MED 10ML LUERLOCK TIP W/O SFTY DISP

## (undated) DEVICE — PROVE COVER: Brand: UNBRANDED

## (undated) DEVICE — CANNULA PERF 21FR L30IN MULTISTAGE FEM VEN W/ INSRT KT

## (undated) DEVICE — LUER-LOK 360°: Brand: CONNECTA, LUER-LOK

## (undated) DEVICE — CUFF BLD PRSS AD CLTH SGL TB W/ BAYNT CONN ROUNDED CORNER

## (undated) DEVICE — SUTURE SZ 7 L18IN NONABSORBABLE SIL CCS L48MM 1/2 CIR STRNM M655G

## (undated) DEVICE — 72" ARTERIAL PRESSURE TUBING: Brand: ICU MEDICAL

## (undated) DEVICE — ELECTRODE PT RET AD L9FT HI MOIST COND ADH HYDRGEL CORDED

## (undated) DEVICE — Device

## (undated) DEVICE — CANN RCSP GDWIRE STYL 15FR --

## (undated) DEVICE — SET GRAV CK VLV NEEDLESS ST 3 GANGED 4WAY STPCOCK HI FLO 10

## (undated) DEVICE — PRESSURE MONITORING SET: Brand: TRUWAVE

## (undated) DEVICE — SPONGE GZ W4XL4IN COT RADPQ HIGHLY ABSRB

## (undated) DEVICE — OPEN HEART B-RICHMOND: Brand: MEDLINE INDUSTRIES, INC.

## (undated) DEVICE — SYRINGE KIT,PACKAGED,,150FT,MK 7(ANGIO-ARTERION, 150ML SYR KIT W/QFT,MC)(60729385): Brand: MEDRAD® MARK 7 ARTERION DISPOSABLE SYRINGE 150 ML WITH QUICK FILL TUBE

## (undated) DEVICE — SUTURE ETHBND 2-0 30IN NONABSORB GRN WHT V-5 17MM 1/2 PXX52N

## (undated) DEVICE — SOLUTION IV 1000ML PH 7.4 INJ NRMSOL R

## (undated) DEVICE — CATHETER DRAINAGE STR 13 FRX32 CM VENTRICULAR BULL TIP STYL

## (undated) DEVICE — RADIFOCUS OPTITORQUE ANGIOGRAPHIC CATHETER: Brand: OPTITORQUE

## (undated) DEVICE — FORCEPS BX L240CM JAW DIA2.4MM ORNG L CAP W/ NDL DISP RAD

## (undated) DEVICE — SUTURE PROL SZ 5-0 L30IN NONABSORBABLE BLU L13MM RB-2 1/2 8710H

## (undated) DEVICE — CANNULA SUCT L8.5IN DIA20FR VENT CONN 3/8IN ART MTL CRV TIP

## (undated) DEVICE — WRAP SURG W1.31XL1.34M CARD FOR PT 165-172CM THERMOWRP

## (undated) DEVICE — SUTURE VCRL SZ 0 L18IN ABSRB VLT L40MM CT 1/2 CIR J752D

## (undated) DEVICE — SUTURE TICRON DBL ARMED 2 0 CV 305 42IN BLU N ABSRB BRAID 8886303551

## (undated) DEVICE — SOL INJ SOD CL 0.9% 500ML BG --

## (undated) DEVICE — SUTURE MCRYL SZ 3-0 L27IN ABSRB UD L24MM PS-1 3/8 CIR PRIM Y936H

## (undated) DEVICE — YANKAUER,BULB TIP,W/O VENT,RIGID,STERILE: Brand: MEDLINE

## (undated) DEVICE — GLOVE SURG SZ 7.5 L11.2IN THK9.8MIL STRW LTX POLYMER BEAD

## (undated) DEVICE — SNARE ENDOSCP POLYP MED STD AD 2.4X27X240 CM 2.8 MM OVL SENS

## (undated) DEVICE — GLUE TISS 10ML PLAS STD SPRD TIP SYR PLUNG PREFIL SKIN CLSR 5PK

## (undated) DEVICE — COR-KNOT® QUICK LOAD® SINGLES: Brand: COR-KNOT® QUICK LOAD®

## (undated) DEVICE — SPLINT WR POS F/ARTERIAL ACC -- BX/10

## (undated) DEVICE — GLIDESHEATH SLENDER ACCESS KIT: Brand: GLIDESHEATH SLENDER

## (undated) DEVICE — SUTURE NONABSORBABLE MONOFILAMENT 4-0 RB-1 36 IN BLU PROLENE 8557H

## (undated) DEVICE — LOOP,VESSEL,MAXI,BLUE,2/PK,STERILE: Brand: MEDLINE

## (undated) DEVICE — AVID DUAL STAGE VENOUS DRAINAGE CANNULA: Brand: AVID DUAL STAGE VENOUS DRAINAGE CANNULA

## (undated) DEVICE — BAG RED 3PLY 2MIL 30X40 IN

## (undated) DEVICE — SOLUTION IV 1000ML 140MEQ/L SOD 5MEQ/L K 3MEQ/L MG 27MEQ/L

## (undated) DEVICE — GOWN,SIRUS,NONRNF,SETINSLV,XL,20/CS: Brand: MEDLINE

## (undated) DEVICE — OPEN HEART A- RICHMOND: Brand: MEDLINE INDUSTRIES, INC.

## (undated) DEVICE — AGENT HEMSTAT W4XL4IN OXIDIZED REGENERATED CELOS ABSRB SFT

## (undated) DEVICE — DRESSING FOAM W8.7XL9.1IN SAFETAC LAYR SELF ADH MEPILEX

## (undated) DEVICE — GUIDEWIRE VASC L260CM 0.035IN J TIP L3MM PTFE FIX COR NAMIC

## (undated) DEVICE — PLEDGET SUT SFT OVL 3 8X5 16IN

## (undated) DEVICE — SUTURE PROL SZ 4-0 L36IN NONABSORBABLE BLU L26MM SH 1/2 CIR 8521H

## (undated) DEVICE — SYR 3ML LL TIP 1/10ML GRAD --

## (undated) DEVICE — COR-KNOT MINI® COMBO KITBASE PACKAGE TYPE - KITEACH STERILE PACKAGE KIT CONTAINS (2) SINGLE PATIENT USE COR-KNOT MINI® DEVICES AND (12) COR-KNOT® QUICK LOADS®.: Brand: COR-KNOT MINI®

## (undated) DEVICE — SUTURE DEK POLY GRN BR SZ3 0 T 2 2N 6716

## (undated) DEVICE — IV START KIT: Brand: MEDLINE